# Patient Record
Sex: FEMALE | Race: WHITE
[De-identification: names, ages, dates, MRNs, and addresses within clinical notes are randomized per-mention and may not be internally consistent; named-entity substitution may affect disease eponyms.]

---

## 2017-06-23 ENCOUNTER — HOSPITAL ENCOUNTER (OUTPATIENT)
Dept: HOSPITAL 62 - RAD | Age: 52
End: 2017-06-23
Attending: INTERNAL MEDICINE
Payer: MEDICARE

## 2017-06-23 DIAGNOSIS — R91.8: ICD-10-CM

## 2017-06-23 DIAGNOSIS — J44.9: Primary | ICD-10-CM

## 2017-06-23 DIAGNOSIS — Z72.0: ICD-10-CM

## 2017-06-23 DIAGNOSIS — R06.09: ICD-10-CM

## 2017-06-23 DIAGNOSIS — Z87.01: ICD-10-CM

## 2017-06-23 DIAGNOSIS — R91.1: ICD-10-CM

## 2017-06-23 DIAGNOSIS — R09.02: ICD-10-CM

## 2017-06-23 DIAGNOSIS — Z80.1: ICD-10-CM

## 2017-06-23 PROCEDURE — 71250 CT THORAX DX C-: CPT

## 2017-06-23 NOTE — RADIOLOGY REPORT (SQ)
EXAM DESCRIPTION:  CT CHEST WITHOUT



COMPLETED DATE/TIME:  6/23/2017 11:09 am



REASON FOR STUDY:  SOLITARY PULMONARY NODULE R91.1  SOLITARY PULMONARY NODULE



COMPARISON:  2/17/2017



TECHNIQUE:  CT scan performed of the chest without intravenous contrast.  Images reviewed with lung, 
soft tissue and bone windows.  Reconstructed coronal and sagittal MPR images reviewed.  All images st
ored on PACS.

All CT scanners at this facility use dose modulation, iterative reconstruction, and/or weight based d
osing when appropriate to reduce radiation dose to as low as reasonably achievable (ALARA).

[facility]: Dose Right  [facility]: CareDose    [facility]: Dose Right    [facility]: Teradose 4D    [facility]: Smart Technologies



RADIATION DOSE:  Up-to-date CT equipment and radiation dose reduction techniques were employed. CTDIv
ol: 6.0 mGy. DLP: 248 mGy-cm. mGy.



LIMITATIONS:  No technical limitations.



FINDINGS:  LUNGS AND PLEURA: Bilateral chronic appearing changes are identified which appear stable. 
 The previously described nodule in the right upper lobe is again identified and is less apparent shraddha
n on the previous study.  Again this could represent parenchymal scarring.  On the current study ther
e is some associated ground-glass opacities which may represent an acute process superimposed on the 
chronic underlying changes.  There is a focal ground-glass opacity in the right upper lung field rebekah
uring 7.8 mm best seen on image number 17 which was not apparent on the previous study.  There is a 2
nd focal ground-glass opacity in the left mid lung field measuring 13.4 mm best seen on image number 
27 which was not apparent on the previous study.  A 3rd ground-glass opacity is identified in the lef
t upper lung field anteriorly measuring 10.5 mm which is best seen on image number 30 and was not macie
arent on the previous study.  There are couple other tiny pulmonary nodules which appears stable.  No
 acute consolidations or pleural effusions are identified.

HILAR AND MEDIASTINAL STRUCTURES: No identified masses or abnormal nodes.  No obvious aneurysm.

HEART AND VASCULAR STRUCTURES: No aneurysm.  No pericardial effusion.

UPPER ABDOMEN: No significant findings.  Limited exam.

THYROID AND OTHER SOFT TISSUES: No masses.  No adenopathy.

BONES: No significant finding.

HARDWARE: None in the chest.

OTHER: No other significant findings.



IMPRESSION:  Scattered ground-glass opacities as noted above.  These may represent an acute process a
nd a followup chest CT scan in 6 to 12 months is recommended for further evaluation.  No consolidatio
ns or pleural effusions are identified.  Other findings as noted above.



COMMENT:   Fleischner Criteria for Ground Glass Nodules:

>6mm ground glass single nodule: CT 6-12 mo, then CT every 2 yr until 5 yr



TECHNICAL DOCUMENTATION:  JOB ID:  [id number]

Quality ID # [id number]: Final reports with documentation of one or more dose reduction techniques (e.g., Au
tomated exposure control, adjustment of the mA and/or kV according to patient size, use of iterative 
reconstruction technique)

 2011 [employer]- All Rights Reserved

## 2019-09-30 ENCOUNTER — HOSPITAL ENCOUNTER (INPATIENT)
Dept: HOSPITAL 62 - ER | Age: 54
LOS: 3 days | Discharge: HOME HEALTH | DRG: 193 | End: 2019-10-03
Attending: INTERNAL MEDICINE | Admitting: INTERNAL MEDICINE
Payer: MEDICARE

## 2019-09-30 DIAGNOSIS — E87.1: ICD-10-CM

## 2019-09-30 DIAGNOSIS — F31.9: ICD-10-CM

## 2019-09-30 DIAGNOSIS — J13: Primary | ICD-10-CM

## 2019-09-30 DIAGNOSIS — Z99.81: ICD-10-CM

## 2019-09-30 DIAGNOSIS — F17.210: ICD-10-CM

## 2019-09-30 DIAGNOSIS — I11.0: ICD-10-CM

## 2019-09-30 DIAGNOSIS — J96.22: ICD-10-CM

## 2019-09-30 DIAGNOSIS — Z79.899: ICD-10-CM

## 2019-09-30 DIAGNOSIS — I27.29: ICD-10-CM

## 2019-09-30 DIAGNOSIS — Z79.51: ICD-10-CM

## 2019-09-30 DIAGNOSIS — I50.9: ICD-10-CM

## 2019-09-30 DIAGNOSIS — Z23: ICD-10-CM

## 2019-09-30 DIAGNOSIS — J44.1: ICD-10-CM

## 2019-09-30 DIAGNOSIS — J96.21: ICD-10-CM

## 2019-09-30 LAB
ADD MANUAL DIFF: NO
ALBUMIN SERPL-MCNC: 3.1 G/DL (ref 3.5–5)
ALP SERPL-CCNC: 87 U/L (ref 38–126)
ANION GAP SERPL CALC-SCNC: 2 MMOL/L (ref 5–19)
APPEARANCE UR: CLEAR
APTT PPP: (no result) S
ARTERIAL BLOOD FIO2: (no result)
ARTERIAL BLOOD H2CO3: 3.55 MMOL/L (ref 1.05–1.35)
ARTERIAL BLOOD HCO3: 41.4 MMOL/L (ref 20–24)
ARTERIAL BLOOD PCO2: 117.8 MMHG (ref 35–45)
ARTERIAL BLOOD PH: 7.16 (ref 7.35–7.45)
ARTERIAL BLOOD PO2: 154.4 MMHG (ref 80–100)
ARTERIAL BLOOD TOTAL CO2: 45 MMOL/L (ref 21–25)
AST SERPL-CCNC: 31 U/L (ref 14–36)
BASE EXCESS BLDA CALC-SCNC: 9 MMOL/L
BASOPHILS # BLD AUTO: 0 10^3/UL (ref 0–0.2)
BASOPHILS NFR BLD AUTO: 0.5 % (ref 0–2)
BILIRUB DIRECT SERPL-MCNC: 0.1 MG/DL (ref 0–0.4)
BILIRUB SERPL-MCNC: 0.2 MG/DL (ref 0.2–1.3)
BILIRUB UR QL STRIP: NEGATIVE
BUN SERPL-MCNC: 13 MG/DL (ref 7–20)
CALCIUM: 8.1 MG/DL (ref 8.4–10.2)
CHLORIDE SERPL-SCNC: 87 MMOL/L (ref 98–107)
CO2 SERPL-SCNC: 40 MMOL/L (ref 22–30)
EOSINOPHIL # BLD AUTO: 0 10^3/UL (ref 0–0.6)
EOSINOPHIL NFR BLD AUTO: 0.4 % (ref 0–6)
ERYTHROCYTE [DISTWIDTH] IN BLOOD BY AUTOMATED COUNT: 15.5 % (ref 11.5–14)
GLUCOSE SERPL-MCNC: 134 MG/DL (ref 75–110)
GLUCOSE UR STRIP-MCNC: NEGATIVE MG/DL
HCT VFR BLD CALC: 36.1 % (ref 36–47)
HGB BLD-MCNC: 11 G/DL (ref 12–15.5)
KETONES UR STRIP-MCNC: NEGATIVE MG/DL
LYMPHOCYTES # BLD AUTO: 0.4 10^3/UL (ref 0.5–4.7)
LYMPHOCYTES NFR BLD AUTO: 7.2 % (ref 13–45)
MCH RBC QN AUTO: 24.6 PG (ref 27–33.4)
MCHC RBC AUTO-ENTMCNC: 30.4 G/DL (ref 32–36)
MCV RBC AUTO: 81 FL (ref 80–97)
MONOCYTES # BLD AUTO: 0.4 10^3/UL (ref 0.1–1.4)
MONOCYTES NFR BLD AUTO: 7.9 % (ref 3–13)
NEUTROPHILS # BLD AUTO: 4.7 10^3/UL (ref 1.7–8.2)
NEUTS SEG NFR BLD AUTO: 84 % (ref 42–78)
NITRITE UR QL STRIP: NEGATIVE
NT PRO BNP: 4350 PG/ML (ref 5–900)
PH UR STRIP: 6 [PH] (ref 5–9)
PLATELET # BLD: 233 10^3/UL (ref 150–450)
POTASSIUM SERPL-SCNC: 5 MMOL/L (ref 3.6–5)
PROT SERPL-MCNC: 5.3 G/DL (ref 6.3–8.2)
PROT UR STRIP-MCNC: NEGATIVE MG/DL
RBC # BLD AUTO: 4.46 10^6/UL (ref 3.72–5.28)
SAO2 % BLDA: 98.2 % (ref 94–98)
SP GR UR STRIP: 1
TOTAL CELLS COUNTED % (AUTO): 100 %
TROPONIN I SERPL-MCNC: < 0.012 NG/ML
UROBILINOGEN UR-MCNC: NEGATIVE MG/DL (ref ?–2)
WBC # BLD AUTO: 5.6 10^3/UL (ref 4–10.5)

## 2019-09-30 PROCEDURE — 94799 UNLISTED PULMONARY SVC/PX: CPT

## 2019-09-30 PROCEDURE — 84484 ASSAY OF TROPONIN QUANT: CPT

## 2019-09-30 PROCEDURE — 83880 ASSAY OF NATRIURETIC PEPTIDE: CPT

## 2019-09-30 PROCEDURE — 94668 MNPJ CHEST WALL SBSQ: CPT

## 2019-09-30 PROCEDURE — 36415 COLL VENOUS BLD VENIPUNCTURE: CPT

## 2019-09-30 PROCEDURE — 87086 URINE CULTURE/COLONY COUNT: CPT

## 2019-09-30 PROCEDURE — 85025 COMPLETE CBC W/AUTO DIFF WBC: CPT

## 2019-09-30 PROCEDURE — 71045 X-RAY EXAM CHEST 1 VIEW: CPT

## 2019-09-30 PROCEDURE — 82803 BLOOD GASES ANY COMBINATION: CPT

## 2019-09-30 PROCEDURE — 82962 GLUCOSE BLOOD TEST: CPT

## 2019-09-30 PROCEDURE — 84443 ASSAY THYROID STIM HORMONE: CPT

## 2019-09-30 PROCEDURE — 80307 DRUG TEST PRSMV CHEM ANLYZR: CPT

## 2019-09-30 PROCEDURE — 36600 WITHDRAWAL OF ARTERIAL BLOOD: CPT

## 2019-09-30 PROCEDURE — 99291 CRITICAL CARE FIRST HOUR: CPT

## 2019-09-30 PROCEDURE — 94640 AIRWAY INHALATION TREATMENT: CPT

## 2019-09-30 PROCEDURE — 93306 TTE W/DOPPLER COMPLETE: CPT

## 2019-09-30 PROCEDURE — 81001 URINALYSIS AUTO W/SCOPE: CPT

## 2019-09-30 PROCEDURE — 83735 ASSAY OF MAGNESIUM: CPT

## 2019-09-30 PROCEDURE — 87040 BLOOD CULTURE FOR BACTERIA: CPT

## 2019-09-30 PROCEDURE — 94060 EVALUATION OF WHEEZING: CPT

## 2019-09-30 PROCEDURE — 83605 ASSAY OF LACTIC ACID: CPT

## 2019-09-30 PROCEDURE — 93005 ELECTROCARDIOGRAM TRACING: CPT

## 2019-09-30 PROCEDURE — 5A09457 ASSISTANCE WITH RESPIRATORY VENTILATION, 24-96 CONSECUTIVE HOURS, CONTINUOUS POSITIVE AIRWAY PRESSURE: ICD-10-PCS | Performed by: EMERGENCY MEDICINE

## 2019-09-30 PROCEDURE — 80048 BASIC METABOLIC PNL TOTAL CA: CPT

## 2019-09-30 PROCEDURE — 94667 MNPJ CHEST WALL 1ST: CPT

## 2019-09-30 PROCEDURE — 71275 CT ANGIOGRAPHY CHEST: CPT

## 2019-09-30 PROCEDURE — 93010 ELECTROCARDIOGRAM REPORT: CPT

## 2019-09-30 PROCEDURE — 94660 CPAP INITIATION&MGMT: CPT

## 2019-09-30 PROCEDURE — 96365 THER/PROPH/DIAG IV INF INIT: CPT

## 2019-09-30 PROCEDURE — 80053 COMPREHEN METABOLIC PANEL: CPT

## 2019-09-30 PROCEDURE — 90686 IIV4 VACC NO PRSV 0.5 ML IM: CPT

## 2019-09-30 NOTE — RADIOLOGY REPORT (SQ)
EXAM DESCRIPTION:





CLINICAL HISTORY:

54 years Female, cough/sob



COMPARISON:

Chest x-ray 6/25/2015. Chest 6/23/2017.







FINDINGS:

Mild cardiomegaly which has increased since previous study and is

probably significant because of the underlying hyperinflation. No

suspicious mediastinal widening.

Hyperinflation consistent with COPD/emphysema. There is mild

right lower lobe infiltrate. There is mild left perihilar

possibly superior segment of left lower lobe infiltrate. Previous

CT chest demonstrated view focal interstitial infiltrates.





IMPRESSION:



1. Cardiomegaly. Underlying COPD/emphysema. Infiltrates in the

right lower lobe and left perihilar region. These could represent

infectious pneumonia or more chronic interstitial process. CT may

be more accurate for evaluation of lung parenchyma.

2. 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

finding since 2015.

## 2019-09-30 NOTE — RADIOLOGY REPORT (SQ)
EXAM DESCRIPTION: 



CT CHEST ANGIOGRAPHY WITHOUT THEN WITH IV CONTRAST,

three-dimensional reconstructions



COMPLETED DATE/TME:  09/30/2019 21:37



CLINICAL HISTORY: 



54 years, Female, sob/cough



This exam was performed according to our departmental

dose-optimization program which includes automated exposure

control, adjustment of the mA and/or kVp according to patient

size and/or use of iterative reconstruction technique where

applicable.





Compared to CT chest dated 6/23/2017.



FINDINGS: Thyroid is within normal limits. Aorta is mildly

atherosclerotic without significant evidence for aneurysm or

dissection. Pulmonary arteries are well opacified with no

significant filling defects in the pulmonary arterial tree to

suggest acute pulmonary embolism. No significant pericardial

effusions. Trace layering right pleural effusion. Visualized

upper abdominal organs are unremarkable. Evaluation of the lung

parenchyma demonstrates extensive diffuse emphysematous changes.

No suspicious lung nodule or mass. No consolidations to suggest

pneumonia. Mild right basilar atelectatic changes.



IMPRESSION:



No acute pulmonary embolism. Trace right pleural effusion.

Extensive emphysematous changes.

## 2019-09-30 NOTE — ER DOCUMENT REPORT
ED General





- General


Chief Complaint: Breathing Difficulty


Stated Complaint: RESP DISTRESS


Time Seen by Provider: 19 19:16


Mode of Arrival: Medic


Information source: Patient


TRAVEL OUTSIDE OF THE [place] IN LAST 30 DAYS: No





- HPI


Notes: 





Patient is brought in by medic with respiratory distress.  Patient is a chronic 

smoker with COPD.  She was found by paramedics to have his O2 saturation of 78% 

on room air with elevated CO2.  Patient was complaining of severe shortness of 

breath.  On arrival to the emergency department patient states that she is f

eeling better.  In route she did receive multiple treatments and CPAP as well as

Solu-Medrol and magnesium.  Patient denies any history of congestive heart 

failure.  She denies any pain.  She states she has felt weak.  Her symptoms have

been constant and severe.  They are worse with exertion and better with rest.  

There is no known radiation of the symptoms.  She has had an increased of her 

baseline cough.  No fevers.





- Related Data


Allergies/Adverse Reactions: 


                                        





No Known Allergies Allergy (Verified 14 03:01)


   











Past Medical History





- General


Information source: Patient





- Social History


Smoking Status: Current Every Day Smoker


Frequency of alcohol use: None


Drug Abuse: None


Family History: Reviewed & Not Pertinent, Hypertension


Patient has suicidal ideation: No


Patient has homicidal ideation: No





- Past Medical History


Cardiac Medical History: Reports: Hx Hypertension


   Denies: Hx Coronary Artery Disease, Hx Heart Attack


Pulmonary Medical History: Reports: Hx Asthma, Hx Bronchitis, Hx COPD, Hx 

Pneumonia


Neurological Medical History: Reports: Hx Seizures - ONCE 3 YEARS AGO.  Denies: 

Hx Cerebrovascular Accident


Renal/ Medical History: Reports: Hx Kidney Stones


Musculoskeletal Medical History: Reports Hx Arthritis


Psychiatric Medical History: Reports: Hx Bipolar Disorder


Past Surgical History: Reports: Hx  Section, Hx Orthopedic Surgery - 

right knee.  Denies: Hx Hysterectomy, Hx Pacemaker





- Immunizations


Hx Diphtheria, Pertussis, Tetanus Vaccination: No


Hx Pneumococcal Vaccination: 13





Review of Systems





- Review of Systems


Constitutional: Malaise, Weakness


Cardiovascular: denies: Chest pain, Palpitations


Respiratory: Cough, Short of breath


Gastrointestinal: denies: Abdominal pain, Diarrhea, Vomiting


-: Yes All other systems reviewed and negative





Physical Exam





- Vital signs


Vitals: 


                                        











Resp Pulse Ox


 


 20   99 


 


 19 19:31  19 19:31











Interpretation: Tachycardic, Hypoxic





- General


General appearance: Alert


In distress: Mild





- HEENT


Head: Normocephalic, Atraumatic


Eyes: Normal


Pupils: PERRL





- Respiratory


Respiratory status: Respiratory distress


Chest status: Nontender


Breath sounds: Decreased air movement, Rhonchi, Wheezing


Chest palpation: Normal





- Cardiovascular


Rhythm: Tachycardia


Heart sounds: Normal auscultation


Murmur: No





- Abdominal


Inspection: Normal


Distension: No distension


Bowel sounds: Normal


Tenderness: Nontender


Organomegaly: No organomegaly





- Back


Back: Normal, Nontender





- Extremities


General upper extremity: Normal inspection, Nontender, Normal color, Normal ROM,

Normal temperature


General lower extremity: Normal inspection, Nontender, Normal color, Normal ROM,

Normal temperature, Normal weight bearing.  No: Mag's sign





- Neurological


Neuro grossly intact: Yes


Cognition: Normal


Orientation: AAOx4


Seymour Coma Scale Eye Opening: Spontaneous


Seymour Coma Scale Verbal: Oriented


Rajiv Coma Scale Motor: Obeys Commands


Rajiv Coma Scale Total: 15


Speech: Normal


Motor strength normal: LUE, RUE, LLE, RLE


Sensory: Normal





- Psychological


Associated symptoms: Normal affect, Normal mood





- Skin


Skin Temperature: Warm


Skin Moisture: Dry


Skin Color: Normal





Course





- Re-evaluation


Re-evalutation: 





19 23:03


Patient arrived with some mild tachycardia and tachypnea.  She was better after 

the treatments in the ambulance.  Patient still had wheezing in all fields.  She

had a pH of 7.16.  Her CO2 was 117.  She was mentating normally with a 

saturation of 94% on 4 L.  However due to the severe hypercapnia I placed the 

patient on BiPAP.  She also had a mixed picture of infectious versus possible 

heart failure.  It is possible she may also have both processes.  Therefore I 

have treated the patient with Lasix and antibiotics.  I have discussed the case 

with the hospitalist who is accepted her for admission.  I have ordered a CTA of

the chest and this is currently pending.  Hospitalist is aware and will follow 

up on the results.  Patient states that she is currently feeling much better.  

She is doing well on BiPAP with good oxygen saturations.  She is hemodynamically

stable.





- Vital Signs


Vital signs: 


                                        











Temp Pulse Resp BP Pulse Ox


 


 98.2 F      18   133/72 H  100 


 


 19 19:46     19 21:35  19 21:35  19 21:35














- Laboratory


Result Diagrams: 


                                 19 19:21





                                 19 19:21


Laboratory results interpreted by me: 


                                        











  19





  19:21 19:21 19:21


 


Hgb  11.0 L  


 


MCH  24.6 L  


 


MCHC  30.4 L  


 


RDW  15.5 H  


 


Lymph % (Auto)  7.2 L  


 


Absolute Lymphs (auto)  0.4 L  


 


Seg Neutrophils %  84.0 H  


 


Carbonic Acid   


 


ABG pH   


 


ABG pCO2   


 


ABG pO2   


 


ABG HCO3   


 


ABG Total CO2   


 


ABG O2 Saturation   


 


Sodium   129.1 L 


 


Chloride   87 L 


 


Carbon Dioxide   40 H* 


 


Anion Gap   2 L 


 


Glucose   134 H 


 


POC Glucose   


 


Lactic Acid   


 


Calcium   8.1 L 


 


NT-Pro-B Natriuret Pep    4350 H


 


Total Protein   5.3 L 


 


Albumin   3.1 L 














  19





  19:21 20:04 20:33


 


Hgb   


 


MCH   


 


MCHC   


 


RDW   


 


Lymph % (Auto)   


 


Absolute Lymphs (auto)   


 


Seg Neutrophils %   


 


Carbonic Acid  3.55 H  


 


ABG pH  7.16 L*  


 


ABG pCO2  117.8 H*  


 


ABG pO2  154.4 H  


 


ABG HCO3  41.4 H  


 


ABG Total CO2  45.0 H  


 


ABG O2 Saturation  98.2 H  


 


Sodium   


 


Chloride   


 


Carbon Dioxide   


 


Anion Gap   


 


Glucose   


 


POC Glucose   140 H 


 


Lactic Acid    0.6 L


 


Calcium   


 


NT-Pro-B Natriuret Pep   


 


Total Protein   


 


Albumin   














- Diagnostic Test


Radiology reviewed: Image reviewed, Reports reviewed





- EKG Interpretation by Me


EKG shows normal: Sinus rhythm


Rate: Tachycardia - 105


Rhythm: NSR


Axis/QRS: RBBB - incomplete





Critical Care Note





- Critical Care Note


Total time excluding time spent on procedures (mins): 50


Comments: 





Approximately 50 minutes of critical care time were spent on this patient.  This

included multiple reassessments.  Including discussing the case with patient and

family.  It including discussing the case with consultants.  It included 

reviewing laboratories and images.  It also included reviewing old records.





Discharge





- Discharge


Clinical Impression: 


 COPD exacerbation





Respiratory failure


Qualifiers:


 Chronicity: acute Respiratory failure complication: hypercapnia Qualified 

Code(s): J96.02 - Acute respiratory failure with hypercapnia





Condition: Serious


Disposition: ADMITTED AS INPATIENT


Admitting Provider: Benrard (Hospitalist)


Unit Admitted: [facility]

## 2019-10-01 LAB
ADD MANUAL DIFF: NO
ANION GAP SERPL CALC-SCNC: 5 MMOL/L (ref 5–19)
ARTERIAL BLOOD FIO2: (no result)
ARTERIAL BLOOD H2CO3: 2.52 MMOL/L (ref 1.05–1.35)
ARTERIAL BLOOD HCO3: 41.6 MMOL/L (ref 20–24)
ARTERIAL BLOOD PCO2: 83.8 MMHG (ref 35–45)
ARTERIAL BLOOD PH: 7.31 (ref 7.35–7.45)
ARTERIAL BLOOD PO2: 97.2 MMHG (ref 80–100)
ARTERIAL BLOOD TOTAL CO2: 44.2 MMOL/L (ref 21–25)
BARBITURATES UR QL SCN: NEGATIVE
BASE EXCESS BLDA CALC-SCNC: 12.2 MMOL/L
BASOPHILS # BLD AUTO: 0 10^3/UL (ref 0–0.2)
BASOPHILS NFR BLD AUTO: 0.1 % (ref 0–2)
BUN SERPL-MCNC: 10 MG/DL (ref 7–20)
CALCIUM: 8.2 MG/DL (ref 8.4–10.2)
CHLORIDE SERPL-SCNC: 86 MMOL/L (ref 98–107)
CO2 SERPL-SCNC: 41 MMOL/L (ref 22–30)
EOSINOPHIL # BLD AUTO: 0 10^3/UL (ref 0–0.6)
EOSINOPHIL NFR BLD AUTO: 0 % (ref 0–6)
ERYTHROCYTE [DISTWIDTH] IN BLOOD BY AUTOMATED COUNT: 15.6 % (ref 11.5–14)
GLUCOSE SERPL-MCNC: 109 MG/DL (ref 75–110)
HCT VFR BLD CALC: 36.7 % (ref 36–47)
HGB BLD-MCNC: 11.3 G/DL (ref 12–15.5)
LYMPHOCYTES # BLD AUTO: 0.2 10^3/UL (ref 0.5–4.7)
LYMPHOCYTES NFR BLD AUTO: 7 % (ref 13–45)
MCH RBC QN AUTO: 24.7 PG (ref 27–33.4)
MCHC RBC AUTO-ENTMCNC: 30.9 G/DL (ref 32–36)
MCV RBC AUTO: 80 FL (ref 80–97)
METHADONE UR QL SCN: NEGATIVE
MONOCYTES # BLD AUTO: 0.1 10^3/UL (ref 0.1–1.4)
MONOCYTES NFR BLD AUTO: 3.3 % (ref 3–13)
NEUTROPHILS # BLD AUTO: 2 10^3/UL (ref 1.7–8.2)
NEUTS SEG NFR BLD AUTO: 89.6 % (ref 42–78)
PCP UR QL SCN: NEGATIVE
PLATELET # BLD: 257 10^3/UL (ref 150–450)
POTASSIUM SERPL-SCNC: 5.3 MMOL/L (ref 3.6–5)
RBC # BLD AUTO: 4.6 10^6/UL (ref 3.72–5.28)
SAO2 % BLDA: 96.4 % (ref 94–98)
TOTAL CELLS COUNTED % (AUTO): 100 %
URINE AMPHETAMINES SCREEN: NEGATIVE
URINE BENZODIAZEPINES SCREEN: NEGATIVE
URINE COCAINE SCREEN: NEGATIVE
URINE MARIJUANA (THC) SCREEN: (no result)
WBC # BLD AUTO: 2.3 10^3/UL (ref 4–10.5)

## 2019-10-01 RX ADMIN — HEPARIN SODIUM SCH UNIT: 5000 INJECTION, SOLUTION INTRAVENOUS; SUBCUTANEOUS at 06:01

## 2019-10-01 RX ADMIN — ACETAMINOPHEN PRN MG: 325 TABLET ORAL at 19:00

## 2019-10-01 RX ADMIN — AZITHROMYCIN MONOHYDRATE SCH MLS/HR: 500 INJECTION, POWDER, LYOPHILIZED, FOR SOLUTION INTRAVENOUS at 21:33

## 2019-10-01 RX ADMIN — PREDNISONE SCH MG: 20 TABLET ORAL at 09:19

## 2019-10-01 RX ADMIN — ACETAMINOPHEN PRN MG: 325 TABLET ORAL at 14:22

## 2019-10-01 RX ADMIN — FLUTICASONE PROPIONATE SCH SPR: 50 SPRAY, METERED NASAL at 09:19

## 2019-10-01 RX ADMIN — HEPARIN SODIUM SCH UNIT: 5000 INJECTION, SOLUTION INTRAVENOUS; SUBCUTANEOUS at 21:41

## 2019-10-01 RX ADMIN — ACETAMINOPHEN PRN MG: 325 TABLET ORAL at 06:05

## 2019-10-01 RX ADMIN — FLUTICASONE PROPIONATE SCH SPR: 50 SPRAY, METERED NASAL at 21:41

## 2019-10-01 RX ADMIN — CEFTRIAXONE SCH MLS/HR: 1 INJECTION, SOLUTION INTRAVENOUS at 20:39

## 2019-10-01 RX ADMIN — PREDNISONE SCH MG: 20 TABLET ORAL at 17:26

## 2019-10-01 RX ADMIN — HEPARIN SODIUM SCH UNIT: 5000 INJECTION, SOLUTION INTRAVENOUS; SUBCUTANEOUS at 14:18

## 2019-10-01 RX ADMIN — NICOTINE SCH EACH: 21 PATCH, EXTENDED RELEASE TOPICAL at 15:32

## 2019-10-01 NOTE — EKG REPORT
SEVERITY:- ABNORMAL ECG -

SINUS TACHYCARDIA

INCOMPLETE RIGHT BUNDLE BRANCH BLOCK

:

Confirmed by: Eleanor Cha MD 01-Oct-2019 00:24:40

## 2019-10-01 NOTE — PDOC H&P
History of Present Illness


Admission Date/PCP: 


  19 22:57





  





Patient complains of: Shortness of breath


History of Present Illness: 


CICI KIRKPATRICK is a 54 year old female with a past medical history of oxygen 

dependent COPD, tobacco dependence, bipolar hypertension and dyslipidemia.  She 

presents with several days of shortness of breath prompting evaluation emergency

room where she is found to have acute on chronic hypercapnic hypoxic respiratory

failure, right lower lobe pneumonia, congestive heart failure and hyponatremia. 

She started on BiPAP and referred to the hospitalist for admission.  She denies 

current medications.  She admits symptom improvement on BiPAP








Past Medical History


Cardiac Medical History: Reports: Hypertension


   Denies: Coronary Artery Disease, Myocardial Infarction


Pulmonary Medical History: Reports: Asthma, Bronchitis, Chronic Obstructive 

Pulmonary Disease (COPD), Pneumonia


Neurological Medical History: Reports: Seizures - ONCE 3 YEARS AGO


Musculoskeltal Medical History: Reports: Arthritis


Psychiatric Medical History: Reports: Bipolar Disorder, Depression, Tobacco 

Dependency


Hematology: 


   Denies: Anemia





Past Surgical History


Past Surgical History: Reports:  Section, Orthopedic Surgery - right 

knee


   Denies: Hysterectomy, Pacemaker





Social History


Information Source: Patient, [facility] Records


Smoking Status: Current Every Day Smoker


Cigarettes Packs Per Day: 0.5


Number of Years Smokin


Last Time Smoked: 2019


Frequency of Alcohol Use: None


Hx Recreational Drug Use: Yes


Drugs: Marijuana


Hx Prescription Drug Abuse: No





- Advance Directive


Resuscitation Status: Full Code





Family History


Family History: COPD, Hypertension


Parental Family History Reviewed: Yes


Children Family History Reviewed: Yes


Sibling(s) Family History Reviewed.: Yes





Medication/Allergy


Home Medications: 








Bupropion HCl [Wellbutrin] 75 mg PO 11 


Escitalopram Oxalate [Lexapro] 5 mg PO 11 


Invega  11 


Lisinopril/Hydrochlorothiazide [Zestoretic 20-12.5 mg Tablet] 1 each PO DAILY 

13 


Simvastatin [Zocor 40 mg Tablet] 40 mg PO QHS 13 


Risperidone [Risperdal] 5 mg PO 13 


Albuterol Sulfate [Albuterol Sulfate 2.5mg/3 mL] 1 vial IH Q4 PRN #30 vial 

13 


Albuterol Sulfate [Proair HFA Inhalation Aerosol 8.5 gm MDI] 2 puff IH Q4H PRN 

#1 mdi 13 


Clonidine HCl [Catapres 0.2 mg Tablet] 0.2 mg PO 13 


Levofloxacin [Levaquin 750 mg Tablet] 750 mg PO DAILY #5 tablet 06/26/15 


Methylprednisolone [Medrol Dosepack (4 mg/Tab) 21 Tab/Dosepak] 4 mg PO ASDIR PRN

#21 tab.ds.pk 06/26/15 








Allergies/Adverse Reactions: 


                                        





No Known Allergies Allergy (Verified 14 03:01)


   











Review of Systems


ROS unobtainable: Due to mental status





Physical Exam


Vital Signs: 


                                        











Temp Pulse Resp BP Pulse Ox


 


 97.7 F      20   163/87 H  96 


 


 10/01/19 01:15     10/01/19 01:15  10/01/19 01:15  10/01/19 01:15








                                 Intake & Output











 09/29/19 09/30/19 10/01/19





 11:59 11:59 11:59


 


Intake Total   50


 


Balance   50


 


Weight   64.2 kg











General appearance: PRESENT: cooperative, disheveled, severe distress, well-

developed, other - Chronically ill-appearing, appears much older than stated 

age.  ABSENT: well-nourished


Head exam: PRESENT: atraumatic, normocephalic


Eye exam: PRESENT: conjunctiva pink, EOMI, PERRLA.  ABSENT: scleral icterus


Ear exam: PRESENT: normal external ear exam


Mouth exam: PRESENT: moist, tongue midline


Neck exam: ABSENT: carotid bruit, JVD, lymphadenopathy, thyromegaly


Respiratory exam: PRESENT: accessory muscle use, crackles, prolonged expiratory 

phas, rales, retraction, tachypnea


Cardiovascular exam: PRESENT: RRR.  ABSENT: diastolic murmur, rubs, systolic 

murmur


Pulses: PRESENT: normal dorsalis pedis pul


Vascular exam: PRESENT: normal capillary refill


GI/Abdominal exam: PRESENT: normal bowel sounds, soft.  ABSENT: distended, 

guarding, mass, organolmegaly, rebound, tenderness


Rectal exam: PRESENT: deferred


Extremities exam: PRESENT: full ROM.  ABSENT: calf tenderness, clubbing, pedal 

edema


Neurological exam: PRESENT: alert, awake, oriented to person, oriented to place,

oriented to time, oriented to situation, CN II-XII grossly intact.  ABSENT: 

motor sensory deficit


Psychiatric exam: PRESENT: appropriate affect, normal mood.  ABSENT: homicidal 

ideation, suicidal ideation


Skin exam: PRESENT: dry, intact, warm.  ABSENT: cyanosis, rash





Results


Laboratory Results: 


                                        





                                 19 19:21 





                                 19 19:21 





                                        











  19





  19:21 19:21 19:21


 


WBC  5.6  


 


RBC  4.46  


 


Hgb  11.0 L  


 


Hct  36.1  


 


MCV  81  


 


MCH  24.6 L  


 


MCHC  30.4 L  


 


RDW  15.5 H  


 


Plt Count  233  


 


Seg Neutrophils %  84.0 H  


 


Carbonic Acid    3.55 H


 


HCO3/H2CO3 Ratio    11:1


 


ABG pH    7.16 L*


 


ABG pCO2    117.8 H*


 


ABG pO2    154.4 H


 


ABG HCO3    41.4 H


 


ABG O2 Saturation    98.2 H


 


ABG Base Excess    9.0


 


FiO2    9L


 


Sodium   129.1 L 


 


Potassium   5.0 


 


Chloride   87 L 


 


Carbon Dioxide   40 H* 


 


Anion Gap   2 L 


 


BUN   13 


 


Creatinine   0.86 


 


Est GFR ( Amer)   > 60 


 


Glucose   134 H 


 


Lactic Acid   


 


Calcium   8.1 L 


 


Magnesium   


 


Total Bilirubin   0.2 


 


AST   31 


 


Alkaline Phosphatase   87 


 


Total Protein   5.3 L 


 


Albumin   3.1 L 


 


TSH   


 


Urine Color   


 


Urine Appearance   


 


Urine pH   


 


Ur Specific Gravity   


 


Urine Protein   


 


Urine Glucose (UA)   


 


Urine Ketones   


 


Urine Blood   


 


Urine Nitrite   


 


Ur Leukocyte Esterase   


 


Urine WBC (Auto)   


 


Urine RBC (Auto)   














  19





  19:21 19:21 20:33


 


WBC   


 


RBC   


 


Hgb   


 


Hct   


 


MCV   


 


MCH   


 


MCHC   


 


RDW   


 


Plt Count   


 


Seg Neutrophils %   


 


Carbonic Acid   


 


HCO3/H2CO3 Ratio   


 


ABG pH   


 


ABG pCO2   


 


ABG pO2   


 


ABG HCO3   


 


ABG O2 Saturation   


 


ABG Base Excess   


 


FiO2   


 


Sodium   


 


Potassium   


 


Chloride   


 


Carbon Dioxide   


 


Anion Gap   


 


BUN   


 


Creatinine   


 


Est GFR (African Amer)   


 


Glucose   


 


Lactic Acid    0.6 L


 


Calcium   


 


Magnesium   2.6 H 


 


Total Bilirubin   


 


AST   


 


Alkaline Phosphatase   


 


Total Protein   


 


Albumin   


 


TSH  0.51  


 


Urine Color   


 


Urine Appearance   


 


Urine pH   


 


Ur Specific Gravity   


 


Urine Protein   


 


Urine Glucose (UA)   


 


Urine Ketones   


 


Urine Blood   


 


Urine Nitrite   


 


Ur Leukocyte Esterase   


 


Urine WBC (Auto)   


 


Urine RBC (Auto)   














  09/30/19 10/01/19





  21:46 01:50


 


WBC  


 


RBC  


 


Hgb  


 


Hct  


 


MCV  


 


MCH  


 


MCHC  


 


RDW  


 


Plt Count  


 


Seg Neutrophils %  


 


Carbonic Acid   2.52 H


 


HCO3/H2CO3 Ratio   16:1


 


ABG pH   7.31 L


 


ABG pCO2   83.8 H*


 


ABG pO2   97.2


 


ABG HCO3   41.6 H


 


ABG O2 Saturation   96.4


 


ABG Base Excess   12.2


 


FiO2   40%


 


Sodium  


 


Potassium  


 


Chloride  


 


Carbon Dioxide  


 


Anion Gap  


 


BUN  


 


Creatinine  


 


Est GFR (African Amer)  


 


Glucose  


 


Lactic Acid  


 


Calcium  


 


Magnesium  


 


Total Bilirubin  


 


AST  


 


Alkaline Phosphatase  


 


Total Protein  


 


Albumin  


 


TSH  


 


Urine Color  STRAW 


 


Urine Appearance  CLEAR 


 


Urine pH  6.0 


 


Ur Specific Gravity  1.004 


 


Urine Protein  NEGATIVE 


 


Urine Glucose (UA)  NEGATIVE 


 


Urine Ketones  NEGATIVE 


 


Urine Blood  NEGATIVE 


 


Urine Nitrite  NEGATIVE 


 


Ur Leukocyte Esterase  NEGATIVE 


 


Urine WBC (Auto)  1 


 


Urine RBC (Auto)  1 








                                        











  19





  19:21


 


Troponin I  < 0.012


 


NT-Pro-B Natriuret Pep  4350 H











Impressions: 


                                        





Chest X-Ray  19 19:20


IMPRESSION:


 


1. Cardiomegaly. Underlying COPD/emphysema. Infiltrates in the


right lower lobe and left perihilar region. These could represent


infectious pneumonia or more chronic interstitial process. CT may


be more accurate for evaluation of lung parenchyma.


2. 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


finding since .


 








Chest/Abdomen CTA  19 21:37


IMPRESSION:


 


No acute pulmonary embolism. Trace right pleural effusion.


Extensive emphysematous changes.


 














Assessment and Plan





- Diagnosis


(1) PNA (pneumonia)


Is this a current diagnosis for this admission?: Yes   


Plan: 


Pneumonia care set deployed, empiric antibiotics, supplemental oxygen with 

BiPAP.  Follow-up CBC and blood culture








(2) Respiratory failure


Qualifiers: 


   Chronicity: acute   Respiratory failure complication: hypercapnia   Qualified

Code(s): J96.02 - Acute respiratory failure with hypercapnia   


Is this a current diagnosis for this admission?: Yes   


Plan: 


Severe acute on chronic, hypercapnic and hypoxic respiratory failure.  BiPAP, 

albuterol and Atrovent, Solu-Medrol, flutter valve and incentive spirometry








(3) Congestive heart failure


Is this a current diagnosis for this admission?: Yes   


Plan: 


Suspect severe pulmonary hypertension and cor pulmonale.  Follow-up echo








(4) COPD exacerbation


Is this a current diagnosis for this admission?: Yes   


Plan: 


Secondary to #1








(5) Hyponatremia


Is this a current diagnosis for this admission?: Yes   


Plan: 


Appears hypovolemic, hypotonic, IV fluid challenge, follow-up chemistry








- Time


Time Spent with patient: 25-34 minutes





- Inpatient Certification


Medical Necessity: Need Close Monitoring Due to Risk of Patient Decompensation

## 2019-10-01 NOTE — PDOC PROGRESS REPORT
Subjective


Progress Note for:: 10/01/19


Subjective:: 





BiPAP mask is leaking.  She actually appears fairly comfortable.  She is 

surprisingly mentating clearly despite PCO2 of 83.


Reason For Visit: 


RESP FAILURE








Physical Exam


Vital Signs: 


                                        











Temp Pulse Resp BP Pulse Ox


 


 98.1 F   99   17   149/69 H  90 L


 


 10/01/19 08:51  10/01/19 08:51  10/01/19 12:23  10/01/19 08:51  10/01/19 12:23








                                 Intake & Output











 09/30/19 10/01/19 10/02/19





 06:59 06:59 06:59


 


Intake Total  50 


 


Balance  50 


 


Weight  56.8 kg 











General appearance: PRESENT: cooperative, mild distress, thin


Head exam: PRESENT: atraumatic, normocephalic


Eye exam: PRESENT: conjunctiva pink, EOMI.  ABSENT: scleral icterus


Ear exam: PRESENT: normal external ear exam.  ABSENT: bleeding, drainage


Mouth exam: PRESENT: other - BiPAP mask in place


Respiratory exam: PRESENT: prolonged expiratory phas, rhonchi, symmetrical, 

tachypnea, wheezes - Bilateral especially expiratory.  ABSENT: stridor


Cardiovascular exam: PRESENT: RRR, +S1, +S2


GI/Abdominal exam: PRESENT: normal bowel sounds, soft.  ABSENT: distended, 

guarding, tenderness


Extremities exam: PRESENT: full ROM.  ABSENT: calf tenderness, pedal edema


Musculoskeletal exam: PRESENT: ambulatory, normal inspection


Neurological exam: PRESENT: alert, awake, oriented to person, oriented to place,

oriented to time, oriented to situation, CN II-XII grossly intact


Psychiatric exam: PRESENT: flat affect.  ABSENT: agitated, anxious


Focused psych exam: ABSENT: delusional, restlessness





Results


Laboratory Results: 


                                        





                                 10/01/19 06:06 





                                 10/01/19 05:15 





                                        











  09/30/19 09/30/19 09/30/19





  19:21 19:21 19:21


 


WBC  5.6  


 


RBC  4.46  


 


Hgb  11.0 L  


 


Hct  36.1  


 


MCV  81  


 


MCH  24.6 L  


 


MCHC  30.4 L  


 


RDW  15.5 H  


 


Plt Count  233  


 


Seg Neutrophils %  84.0 H  


 


Carbonic Acid    3.55 H


 


HCO3/H2CO3 Ratio    11:1


 


ABG pH    7.16 L*


 


ABG pCO2    117.8 H*


 


ABG pO2    154.4 H


 


ABG HCO3    41.4 H


 


ABG O2 Saturation    98.2 H


 


ABG Base Excess    9.0


 


FiO2    9L


 


Sodium   129.1 L 


 


Potassium   5.0 


 


Chloride   87 L 


 


Carbon Dioxide   40 H* 


 


Anion Gap   2 L 


 


BUN   13 


 


Creatinine   0.86 


 


Est GFR ( Amer)   > 60 


 


Glucose   134 H 


 


Lactic Acid   


 


Calcium   8.1 L 


 


Magnesium   


 


Total Bilirubin   0.2 


 


AST   31 


 


Alkaline Phosphatase   87 


 


Total Protein   5.3 L 


 


Albumin   3.1 L 


 


TSH   


 


Urine Color   


 


Urine Appearance   


 


Urine pH   


 


Ur Specific Gravity   


 


Urine Protein   


 


Urine Glucose (UA)   


 


Urine Ketones   


 


Urine Blood   


 


Urine Nitrite   


 


Ur Leukocyte Esterase   


 


Urine WBC (Auto)   


 


Urine RBC (Auto)   














  09/30/19 09/30/19 09/30/19





  19:21 19:21 20:33


 


WBC   


 


RBC   


 


Hgb   


 


Hct   


 


MCV   


 


MCH   


 


MCHC   


 


RDW   


 


Plt Count   


 


Seg Neutrophils %   


 


Carbonic Acid   


 


HCO3/H2CO3 Ratio   


 


ABG pH   


 


ABG pCO2   


 


ABG pO2   


 


ABG HCO3   


 


ABG O2 Saturation   


 


ABG Base Excess   


 


FiO2   


 


Sodium   


 


Potassium   


 


Chloride   


 


Carbon Dioxide   


 


Anion Gap   


 


BUN   


 


Creatinine   


 


Est GFR (African Amer)   


 


Glucose   


 


Lactic Acid    0.6 L


 


Calcium   


 


Magnesium   2.6 H 


 


Total Bilirubin   


 


AST   


 


Alkaline Phosphatase   


 


Total Protein   


 


Albumin   


 


TSH  0.51  


 


Urine Color   


 


Urine Appearance   


 


Urine pH   


 


Ur Specific Gravity   


 


Urine Protein   


 


Urine Glucose (UA)   


 


Urine Ketones   


 


Urine Blood   


 


Urine Nitrite   


 


Ur Leukocyte Esterase   


 


Urine WBC (Auto)   


 


Urine RBC (Auto)   














  09/30/19 10/01/19 10/01/19





  21:46 01:50 05:15


 


WBC    Cancelled


 


RBC    Cancelled


 


Hgb    Cancelled


 


Hct    Cancelled


 


MCV    Cancelled


 


MCH    Cancelled


 


MCHC    Cancelled


 


RDW    Cancelled


 


Plt Count    Cancelled


 


Seg Neutrophils %    Cancelled


 


Carbonic Acid   2.52 H 


 


HCO3/H2CO3 Ratio   16:1 


 


ABG pH   7.31 L 


 


ABG pCO2   83.8 H* 


 


ABG pO2   97.2 


 


ABG HCO3   41.6 H 


 


ABG O2 Saturation   96.4 


 


ABG Base Excess   12.2 


 


FiO2   40% 


 


Sodium   


 


Potassium   


 


Chloride   


 


Carbon Dioxide   


 


Anion Gap   


 


BUN   


 


Creatinine   


 


Est GFR (African Amer)   


 


Glucose   


 


Lactic Acid   


 


Calcium   


 


Magnesium   


 


Total Bilirubin   


 


AST   


 


Alkaline Phosphatase   


 


Total Protein   


 


Albumin   


 


TSH   


 


Urine Color  STRAW  


 


Urine Appearance  CLEAR  


 


Urine pH  6.0  


 


Ur Specific Gravity  1.004  


 


Urine Protein  NEGATIVE  


 


Urine Glucose (UA)  NEGATIVE  


 


Urine Ketones  NEGATIVE  


 


Urine Blood  NEGATIVE  


 


Urine Nitrite  NEGATIVE  


 


Ur Leukocyte Esterase  NEGATIVE  


 


Urine WBC (Auto)  1  


 


Urine RBC (Auto)  1  














  10/01/19 10/01/19





  05:15 06:06


 


WBC   2.3 L D


 


RBC   4.60


 


Hgb   11.3 L


 


Hct   36.7


 


MCV   80


 


MCH   24.7 L


 


MCHC   30.9 L


 


RDW   15.6 H


 


Plt Count   257


 


Seg Neutrophils %   89.6 H


 


Carbonic Acid  


 


HCO3/H2CO3 Ratio  


 


ABG pH  


 


ABG pCO2  


 


ABG pO2  


 


ABG HCO3  


 


ABG O2 Saturation  


 


ABG Base Excess  


 


FiO2  


 


Sodium  132.3 L 


 


Potassium  5.3 H 


 


Chloride  86 L 


 


Carbon Dioxide  41 H* 


 


Anion Gap  5 


 


BUN  10 


 


Creatinine  0.74 


 


Est GFR (African Amer)  > 60 


 


Glucose  109 


 


Lactic Acid  


 


Calcium  8.2 L 


 


Magnesium  


 


Total Bilirubin  


 


AST  


 


Alkaline Phosphatase  


 


Total Protein  


 


Albumin  


 


TSH  


 


Urine Color  


 


Urine Appearance  


 


Urine pH  


 


Ur Specific Gravity  


 


Urine Protein  


 


Urine Glucose (UA)  


 


Urine Ketones  


 


Urine Blood  


 


Urine Nitrite  


 


Ur Leukocyte Esterase  


 


Urine WBC (Auto)  


 


Urine RBC (Auto)  








                                        











  09/30/19





  19:21


 


Troponin I  < 0.012


 


NT-Pro-B Natriuret Pep  4350 H











Impressions: 


                                        





Chest X-Ray  09/30/19 19:20


IMPRESSION:


 


1. Cardiomegaly. Underlying COPD/emphysema. Infiltrates in the


right lower lobe and left perihilar region. These could represent


infectious pneumonia or more chronic interstitial process. CT may


be more accurate for evaluation of lung parenchyma.


2. 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


finding since 2015.


 








Chest/Abdomen CTA  09/30/19 21:37


IMPRESSION:


 


No acute pulmonary embolism. Trace right pleural effusion.


Extensive emphysematous changes.


 














Assessment and Plan





- Diagnosis


(1) PNA (pneumonia)


Is this a current diagnosis for this admission?: Yes   





(2) Respiratory failure


Qualifiers: 


   Chronicity: acute   Respiratory failure complication: hypercapnia   Qualified

Code(s): J96.02 - Acute respiratory failure with hypercapnia   


Is this a current diagnosis for this admission?: Yes   





(3) Congestive heart failure


Qualifiers: 


   Heart failure type: unspecified 


Is this a current diagnosis for this admission?: Yes   





(4) COPD exacerbation


Is this a current diagnosis for this admission?: Yes   





(5) Hyponatremia


Is this a current diagnosis for this admission?: Yes   





(6) Hypertension


Qualifiers: 


   Hypertension type: essential hypertension   Qualified Code(s): I10 - 

Essential (primary) hypertension   


Is this a current diagnosis for this admission?: Yes   





- Summary


Assessment: 


* Pneumonia-pneumonia order set instituted.  IV antibiotics, steroids, 

  supplemental oxygen as well as nebulizer treatments.


* Acute on chronic respiratory failure with hypoxia and hypercapnia-PCO2 was 118

  on admission.  This morning she in fact is resting comfortably and mentating 

  fairly clearly with a PCO2 of 83.  She is wearing her BiPAP.  This likely 

  indicates her baseline PCO2 above 50.  Continue current treatment plan.


* COPD exacerbation-see plan above


* Hyponatremia-IV fluids.  Monitor sodium.


* Congestive heart failure-echocardiogram has been ordered.  Will better be able

  to delineate etiology and treatment plan once this information is available.


* Hypertension-continue lisinopril.  She may benefit from beta-blockade for her 

  congestive heart failure.





- Time


Time Spent with patient: 15-24 minutes


Medications reviewed and adjusted accordingly: Yes


Anticipated discharge: Home

## 2019-10-02 LAB
ARTERIAL BLOOD FIO2: (no result)
ARTERIAL BLOOD H2CO3: 2.31 MMOL/L (ref 1.05–1.35)
ARTERIAL BLOOD HCO3: 43.6 MMOL/L (ref 20–24)
ARTERIAL BLOOD PCO2: 76.8 MMHG (ref 35–45)
ARTERIAL BLOOD PH: 7.37 (ref 7.35–7.45)
ARTERIAL BLOOD PO2: 72.9 MMHG (ref 80–100)
ARTERIAL BLOOD TOTAL CO2: 46 MMOL/L (ref 21–25)
BASE EXCESS BLDA CALC-SCNC: 15 MMOL/L
SAO2 % BLDA: 93.5 % (ref 94–98)

## 2019-10-02 RX ADMIN — AZITHROMYCIN MONOHYDRATE SCH MLS/HR: 500 INJECTION, POWDER, LYOPHILIZED, FOR SOLUTION INTRAVENOUS at 21:24

## 2019-10-02 RX ADMIN — HEPARIN SODIUM SCH UNIT: 5000 INJECTION, SOLUTION INTRAVENOUS; SUBCUTANEOUS at 06:16

## 2019-10-02 RX ADMIN — ACETAMINOPHEN PRN MG: 325 TABLET ORAL at 03:29

## 2019-10-02 RX ADMIN — PREDNISONE SCH MG: 20 TABLET ORAL at 09:05

## 2019-10-02 RX ADMIN — HEPARIN SODIUM SCH UNIT: 5000 INJECTION, SOLUTION INTRAVENOUS; SUBCUTANEOUS at 21:23

## 2019-10-02 RX ADMIN — METOPROLOL SUCCINATE SCH MG: 25 TABLET, EXTENDED RELEASE ORAL at 09:37

## 2019-10-02 RX ADMIN — TIOTROPIUM BROMIDE SCH CAP: 18 CAPSULE ORAL; RESPIRATORY (INHALATION) at 09:38

## 2019-10-02 RX ADMIN — ARIPIPRAZOLE SCH MG: 5 TABLET ORAL at 09:37

## 2019-10-02 RX ADMIN — RISPERIDONE SCH MG: 1 TABLET ORAL at 09:37

## 2019-10-02 RX ADMIN — BUPROPION HYDROCHLORIDE SCH MG: 100 TABLET, FILM COATED ORAL at 09:37

## 2019-10-02 RX ADMIN — IPRATROPIUM BROMIDE AND ALBUTEROL SULFATE SCH ML: 2.5; .5 SOLUTION RESPIRATORY (INHALATION) at 20:42

## 2019-10-02 RX ADMIN — BUDESONIDE SCH MG: 0.5 SUSPENSION RESPIRATORY (INHALATION) at 20:42

## 2019-10-02 RX ADMIN — CEFTRIAXONE SCH MLS/HR: 1 INJECTION, SOLUTION INTRAVENOUS at 17:48

## 2019-10-02 RX ADMIN — FLUTICASONE PROPIONATE SCH SPR: 50 SPRAY, METERED NASAL at 09:05

## 2019-10-02 RX ADMIN — FLUTICASONE PROPIONATE SCH SPR: 50 SPRAY, METERED NASAL at 21:22

## 2019-10-02 RX ADMIN — BUPROPION HYDROCHLORIDE SCH MG: 100 TABLET, FILM COATED ORAL at 17:47

## 2019-10-02 RX ADMIN — NICOTINE SCH EACH: 21 PATCH, EXTENDED RELEASE TOPICAL at 09:05

## 2019-10-02 RX ADMIN — PREDNISONE SCH MG: 20 TABLET ORAL at 17:47

## 2019-10-02 RX ADMIN — BUPROPION HYDROCHLORIDE SCH MG: 100 TABLET, FILM COATED ORAL at 13:29

## 2019-10-02 RX ADMIN — CITALOPRAM HYDROBROMIDE SCH MG: 20 TABLET ORAL at 09:37

## 2019-10-02 RX ADMIN — HEPARIN SODIUM SCH UNIT: 5000 INJECTION, SOLUTION INTRAVENOUS; SUBCUTANEOUS at 13:29

## 2019-10-02 RX ADMIN — LISINOPRIL SCH MG: 10 TABLET ORAL at 09:37

## 2019-10-02 RX ADMIN — IPRATROPIUM BROMIDE AND ALBUTEROL SULFATE SCH ML: 2.5; .5 SOLUTION RESPIRATORY (INHALATION) at 13:43

## 2019-10-02 RX ADMIN — RISPERIDONE SCH MG: 1 TABLET ORAL at 21:23

## 2019-10-02 NOTE — PDOC PROGRESS REPORT
Subjective


Progress Note for:: 10/02/19


Subjective:: 


The patient is resting comfortably.  BiPAP mask is displaced as she is sipping 

fluids.  She reports feeling better and is beginning to question when she can go

home.


Reason For Visit: 


RESP FAILURE








Physical Exam


Vital Signs: 


                                        











Temp Pulse Resp BP Pulse Ox


 


 97.7 F   77   18   146/79 H  96 


 


 10/02/19 11:44  10/02/19 11:44  10/02/19 11:44  10/02/19 11:44  10/02/19 11:44








                                 Intake & Output











 10/01/19 10/02/19 10/03/19





 06:59 06:59 06:59


 


Intake Total 50 2410 


 


Output Total  1300 


 


Balance 50 1110 


 


Weight 56.8 kg 58.3 kg 











General appearance: PRESENT: no acute distress, cooperative, well-developed


Mouth exam: PRESENT: moist, tongue midline


Teeth exam: PRESENT: poor dentation


Respiratory exam: PRESENT: prolonged expiratory phas, wheezes - Still with faint

expiratory wheezes bilaterally.  ABSENT: rales, rhonchi


Cardiovascular exam: PRESENT: RRR, +S1, +S2


GI/Abdominal exam: PRESENT: normal bowel sounds, soft.  ABSENT: distended, 

tenderness


Extremities exam: PRESENT: full ROM.  ABSENT: joint swelling, pedal edema


Musculoskeletal exam: PRESENT: normal inspection


Neurological exam: PRESENT: alert, awake, oriented to person, oriented to place,

oriented to time, oriented to situation, CN II-XII grossly intact


Psychiatric exam: PRESENT: appropriate affect.  ABSENT: agitated, anxious


Focused psych exam: ABSENT: delusional, restlessness





Results


Laboratory Results: 


                                        





                                 10/01/19 06:06 





                                 10/01/19 05:15 





                                        





09/30/19 21:46   Clean Catch Midstream   Urine Culture - Final


                            Mixed Urogenital Anitra





                                        











  09/30/19





  19:21


 


Troponin I  < 0.012


 


NT-Pro-B Natriuret Pep  4350 H











Impressions: 


                                        





Chest X-Ray  09/30/19 19:20


IMPRESSION:


 


1. Cardiomegaly. Underlying COPD/emphysema. Infiltrates in the


right lower lobe and left perihilar region. These could represent


infectious pneumonia or more chronic interstitial process. CT may


be more accurate for evaluation of lung parenchyma.


2. 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


finding since 2015.


 








Chest/Abdomen CTA  09/30/19 21:37


IMPRESSION:


 


No acute pulmonary embolism. Trace right pleural effusion.


Extensive emphysematous changes.


 














Assessment and Plan





- Diagnosis


(1) PNA (pneumonia)


Is this a current diagnosis for this admission?: Yes   





(2) Respiratory failure


Qualifiers: 


   Chronicity: acute   Respiratory failure complication: hypercapnia   Qualified

Code(s): J96.02 - Acute respiratory failure with hypercapnia   


Is this a current diagnosis for this admission?: Yes   





(3) Congestive heart failure


Qualifiers: 


   Heart failure type: unspecified 


Is this a current diagnosis for this admission?: Yes   





(4) COPD exacerbation


Is this a current diagnosis for this admission?: Yes   





(5) Hyponatremia


Is this a current diagnosis for this admission?: Yes   





(6) Hypertension


Qualifiers: 


   Hypertension type: essential hypertension   Qualified Code(s): I10 - 

Essential (primary) hypertension   


Is this a current diagnosis for this admission?: Yes   





(7) Hyperkalemia


Is this a current diagnosis for this admission?: Yes   





- Plan Summary


Summary: 


* Pneumonia-pneumonia order set instituted.  IV antibiotics, steroids, 

  supplemental oxygen as well as nebulizer treatments.


* Acute on chronic respiratory failure with hypoxia and hypercapnia-PCO2 was 118

  on admission.  This morning she in fact is resting comfortably and mentating 

  fairly clearly with a PCO2 of 83.  She is wearing her BiPAP.  This likely 

  indicates her baseline PCO2 above 50.  Continue current treatment plan.


* COPD exacerbation-see plan above


* Hyponatremia-IV fluids.  Monitor sodium.


* Congestive heart failure-echocardiogram has been ordered.  Will better be able

  to delineate etiology and treatment plan once this information is available.


* Hypertension-continue lisinopril.  She may benefit from beta-blockade for her 

  congestive heart failure.





10/2/2019-we will continue to wean from BiPAP.  I have ordered nasal cannula 

with a repeat blood gas in approximately 2 to 3 hours.  The patient should 

remain on BiPAP overnight for tonight.


We will continue gentle fluids to monitor her sodium and possibly correct the 

hyperkalemia.  We will continue to monitor electrolytes.


The echocardiogram results are not available to better delineate congestive 

failure.


I did add low-dose beta-blocker to her lisinopril for congestive heart failure.


Nebulizers and steroids for her COPD.  I discussed adjusting oxygen flow to keep

oxygen saturation between 88 and 92%.





- Time


Time Spent with patient: 15-24 minutes


Medications reviewed and adjusted accordingly: Yes


Anticipated discharge: Home with Homehealth

## 2019-10-02 NOTE — XCELERA REPORT
[address] [address]

                               Tel: [phone]

                               Fax: [phone]



                      Transthoracic Echocardiogram Report

_______________________________________________________________________________



Name: CICI KIRKPATRICK

MRN: [id number]                           Age: 54 yrs

Gender: Female                            : 1965

Patient Status: Inpatient                 Patient Location: [address] [place] [address]

Account #: [id number]

Study Date: 10/01/2019 10:29 AM

Accession #: [id number]

_______________________________________________________________________________



Height: 65 in        Weight: 141 lb        BSA: 1.7 m2

_______________________________________________________________________________

Procedure: A two-dimensional transthoracic echocardiogram with color flow and

Doppler was performed. The study was technically limited with all images being

suboptimal in quality. Images were not obtained from all of the standard

acoustic windows due to the limited scope of the study.

Reason For Study: systolic murmur



History: systolic murmur.

Ordering Physician: LILLIE ABDULLAHI



Performed By: Richelle Hernandes

_______________________________________________________________________________



Interpretation Summary

The left ventricle is normal in size.

There is normal left ventricular wall thickness.

No 'True 2 chamber ' views obtained.Hence cannot comment on the apical and

basal inferior ,and the apical and basal anterior walls.The mid anterior ,the

mid inferior , and the rest of the LV waaaalls contract normally.The LVEF in

these limited views is normal and greater than 60%.

Doppler measurements suggest normal left ventricular diastolic function

There is no thrombus.

The right ventricle is moderately dilated.

The right ventricular systolic function is mild to moderately reduced.

The right atrium is mild to moderately dilated.

The left atrial size is normal.

The interatrial septum is intact with no evidence for an atrial septal defect.

There is no mitral valve stenosis.

There is no evidence of mitral valve prolapse.

There is no vegetation seen on the mitral valve.

There is a mild amount of mitral regurgitation

There is no aortic valvular vegetation.

There is aortic sclerosis without aortic stenosis.

There is no LVOT obstruction.

No aortic regurgitation is present.

There is no tricuspid stenosis.

There is a moderate to severe amount of tricuspid regurgitation

There is servere pulmonary hypertension by echo

rvsp IS ATLEAST 67 MM OF hG , WITH raMEAN OF AT LEAST 20.

There is no pulmonic valvular stenosis.

There is a mild amount of pulmonic regurgitation

The aortic root is normal size.

The inferior vena cava appeared dilated and did not change with respiration

(RAP > 20 mmHg)

There is no pericardial effusion.



MMode/2D Measurements & Calculations

RVDd: 3.3 cm   LVIDd: 4.5 cm   FS: 29.3 %             Ao root diam: 2.0 cm



IVSd: 0.81 cm  LVIDs: 3.2 cm   EDV(Teich): 92.3 ml    Ao root area: 3.2 cm2

               LVPWd: 0.81 cm  ESV(Teich): 40.3 ml    LA dimension: 3.3 cm

                               EF(Teich): 56.3 %



Doppler Measurements & Calculations

MV E max stephan:      MV P1/2t max stephan:     Ao V2 max:         LV V1 max P.5 cm/sec       116.5 cm/sec          181.2 cm/sec       6.2 mmHg

MV A max stephan:      MV P1/2t: 58.7 msec   Ao max PG:         LV V1 max:

97.2 cm/sec        MVA(P1/2t): 3.7 cm2   13.1 mmHg          124.4 cm/sec

MV E/A: 1.2        MV dec slope:



                   580.8 cm/sec2

                   MV dec time: 0.19 sec

        _______________________________________________________________

PA V2 max:         TR max stephan:           MV P1/2t-pr_phl:

120.9 cm/sec       342.1 cm/sec          58.7 msec

PA max P.8 mmHgTR max P.8 mmHg





Left Ventricle

The left ventricle is normal in size. There is normal left ventricular wall

thickness. No 'True 2 chamber ' views obtained.Hence cannot comment on the

apical and basal inferior ,and the apical and basal anterior walls.The mid

anterior ,the mid inferior , and the rest of the LV waaaalls contract

normally.The LVEF in these limited views is normal and greater than 60%.

Doppler measurements suggest normal left ventricular diastolic function. There

is no thrombus. There is no ventricular septal defect visualized.



Right Ventricle

The right ventricle is moderately dilated. The right ventricular systolic

function is mild to moderately reduced.



Atria

The right atrium is mild to moderately dilated. The left atrial size is

normal. The interatrial septum is intact with no evidence for an atrial septal

defect. There is no Doppler evidence for an interatrial shunt.



Mitral Valve

There is no evidence of mitral valve prolapse. There is no vegetation seen on

the mitral valve. There is no mitral valve stenosis. There is a mild amount of

mitral regurgitation.





Aortic Valve

There is no aortic valvular vegetation. There is aortic sclerosis without

aortic stenosis. There is no LVOT obstruction. No aortic regurgitation is

present.



Tricuspid Valve

There is no tricuspid stenosis. There is a moderate to severe amount of

tricuspid regurgitation. There is servere pulmonary hypertension by echo. rvsp

IS ATLEAST 67 MM OF hG , WITH raMEAN OF AT LEAST 20.



Pulmonic Valve

There is no pulmonic valvular stenosis. There is a mild amount of pulmonic

regurgitation.



Great Vessels

The aortic root is normal size. The inferior vena cava appeared dilated and

did not change with respiration (RAP > 20 mmHg).



Effusions

There is no pericardial effusion.





_______________________________________________________________________________

_______________________________________________________________________________



Electronically signed by:      Eleanor Cha      on 10/02/2019 08:33 PM



CC: LILLIE ABDULLAHI, Eleanor

## 2019-10-03 VITALS — DIASTOLIC BLOOD PRESSURE: 69 MMHG | SYSTOLIC BLOOD PRESSURE: 153 MMHG

## 2019-10-03 LAB
ABSOLUTE LYMPHOCYTES# (MANUAL): 1.1 10^3/UL (ref 0.5–4.7)
ABSOLUTE MONOCYTES # (MANUAL): 0.4 10^3/UL (ref 0.1–1.4)
ADD MANUAL DIFF: YES
ANION GAP SERPL CALC-SCNC: 3 MMOL/L (ref 5–19)
ANISOCYTOSIS BLD QL SMEAR: SLIGHT
BASOPHILS NFR BLD MANUAL: 0 % (ref 0–2)
BUN SERPL-MCNC: 11 MG/DL (ref 7–20)
CALCIUM: 8.7 MG/DL (ref 8.4–10.2)
CHLORIDE SERPL-SCNC: 91 MMOL/L (ref 98–107)
CO2 SERPL-SCNC: 41 MMOL/L (ref 22–30)
EOSINOPHIL NFR BLD MANUAL: 0 % (ref 0–6)
ERYTHROCYTE [DISTWIDTH] IN BLOOD BY AUTOMATED COUNT: 15.7 % (ref 11.5–14)
GLUCOSE SERPL-MCNC: 89 MG/DL (ref 75–110)
HCT VFR BLD CALC: 37.6 % (ref 36–47)
HGB BLD-MCNC: 11.7 G/DL (ref 12–15.5)
MCH RBC QN AUTO: 24.8 PG (ref 27–33.4)
MCHC RBC AUTO-ENTMCNC: 31.2 G/DL (ref 32–36)
MCV RBC AUTO: 79 FL (ref 80–97)
MONOCYTES % (MANUAL): 8 % (ref 3–13)
OVALOCYTES BLD QL SMEAR: SLIGHT
PLATELET # BLD: 317 10^3/UL (ref 150–450)
PLATELET COMMENT: ADEQUATE
POIKILOCYTOSIS BLD QL SMEAR: SLIGHT
POTASSIUM SERPL-SCNC: 4.6 MMOL/L (ref 3.6–5)
RBC # BLD AUTO: 4.73 10^6/UL (ref 3.72–5.28)
SEGMENTED NEUTROPHILS % (MAN): 70 % (ref 42–78)
TOTAL CELLS COUNTED BLD: 100
VARIANT LYMPHS NFR BLD MANUAL: 22 % (ref 13–45)
WBC # BLD AUTO: 5.2 10^3/UL (ref 4–10.5)

## 2019-10-03 PROCEDURE — 3E0234Z INTRODUCTION OF SERUM, TOXOID AND VACCINE INTO MUSCLE, PERCUTANEOUS APPROACH: ICD-10-PCS | Performed by: INTERNAL MEDICINE

## 2019-10-03 RX ADMIN — IPRATROPIUM BROMIDE AND ALBUTEROL SULFATE SCH ML: 2.5; .5 SOLUTION RESPIRATORY (INHALATION) at 02:09

## 2019-10-03 RX ADMIN — HEPARIN SODIUM SCH UNIT: 5000 INJECTION, SOLUTION INTRAVENOUS; SUBCUTANEOUS at 06:30

## 2019-10-03 RX ADMIN — BUDESONIDE SCH MG: 0.5 SUSPENSION RESPIRATORY (INHALATION) at 08:37

## 2019-10-03 RX ADMIN — NICOTINE SCH EACH: 21 PATCH, EXTENDED RELEASE TOPICAL at 10:10

## 2019-10-03 RX ADMIN — IPRATROPIUM BROMIDE AND ALBUTEROL SULFATE SCH ML: 2.5; .5 SOLUTION RESPIRATORY (INHALATION) at 13:55

## 2019-10-03 RX ADMIN — METOPROLOL SUCCINATE SCH MG: 25 TABLET, EXTENDED RELEASE ORAL at 10:07

## 2019-10-03 RX ADMIN — BUPROPION HYDROCHLORIDE SCH MG: 100 TABLET, FILM COATED ORAL at 13:20

## 2019-10-03 RX ADMIN — BUPROPION HYDROCHLORIDE SCH MG: 100 TABLET, FILM COATED ORAL at 10:07

## 2019-10-03 RX ADMIN — HEPARIN SODIUM SCH UNIT: 5000 INJECTION, SOLUTION INTRAVENOUS; SUBCUTANEOUS at 13:20

## 2019-10-03 RX ADMIN — IPRATROPIUM BROMIDE AND ALBUTEROL SULFATE SCH ML: 2.5; .5 SOLUTION RESPIRATORY (INHALATION) at 08:32

## 2019-10-03 RX ADMIN — CITALOPRAM HYDROBROMIDE SCH MG: 20 TABLET ORAL at 10:08

## 2019-10-03 RX ADMIN — LISINOPRIL SCH MG: 10 TABLET ORAL at 10:08

## 2019-10-03 RX ADMIN — FLUTICASONE PROPIONATE SCH SPR: 50 SPRAY, METERED NASAL at 10:02

## 2019-10-03 RX ADMIN — TIOTROPIUM BROMIDE SCH CAP: 18 CAPSULE ORAL; RESPIRATORY (INHALATION) at 10:03

## 2019-10-03 RX ADMIN — PREDNISONE SCH MG: 20 TABLET ORAL at 10:07

## 2019-10-03 RX ADMIN — RISPERIDONE SCH MG: 1 TABLET ORAL at 10:08

## 2019-10-03 RX ADMIN — ARIPIPRAZOLE SCH MG: 5 TABLET ORAL at 10:08

## 2019-10-03 NOTE — PDOC DISCHARGE SUMMARY
Impression





- Admit/DC Date/PCP


Admission Date/Primary Care Provider: 


  09/30/19 22:57





  





Discharge Date: 10/03/19





- Discharge Diagnosis


(1) PNA (pneumonia)


Is this a current diagnosis for this admission?: Yes   





(2) Respiratory failure


Is this a current diagnosis for this admission?: Yes   





(3) Congestive heart failure


Is this a current diagnosis for this admission?: Yes   





(4) Cor pulmonale


Is this a current diagnosis for this admission?: Yes   





(5) COPD exacerbation


Is this a current diagnosis for this admission?: Yes   





(6) Hyponatremia


Is this a current diagnosis for this admission?: Yes   





(7) Hypertension


Is this a current diagnosis for this admission?: Yes   





(8) Hyperkalemia


Is this a current diagnosis for this admission?: Yes   





- Assessment


Summary: 


* Pneumonia-pneumonia order set instituted.  IV antibiotics, steroids, 

  supplemental oxygen as well as nebulizer treatments.


* Acute on chronic respiratory failure with hypoxia and hypercapnia-PCO2 was 118

  on admission.  This morning she in fact is resting comfortably and mentating 

  fairly clearly with a PCO2 of 83.  She is wearing her BiPAP.  This likely 

  indicates her baseline PCO2 above 50.  Continue current treatment plan.


* COPD exacerbation-see plan above


* Hyponatremia-IV fluids.  Monitor sodium.


* Congestive heart failure-echocardiogram has been ordered.  Will better be able

  to delineate etiology and treatment plan once this information is available.


* Hypertension-continue lisinopril.  She may benefit from beta-blockade for her 

  congestive heart failure.





10/2/2019-we will continue to wean from BiPAP.  I have ordered nasal cannula 

with a repeat blood gas in approximately 2 to 3 hours.  The patient should 

remain on BiPAP overnight for tonight.


We will continue gentle fluids to monitor her sodium and possibly correct the 

hyperkalemia.  We will continue to monitor electrolytes.


The echocardiogram results are not available to better delineate congestive 

failure.


I did add low-dose beta-blocker to her lisinopril for congestive heart failure.


Nebulizers and steroids for her COPD.  I discussed adjusting oxygen flow to keep

oxygen saturation between 88 and 92%.





- Additional Information


Resuscitation Status: Full Code


Discharge Diet: Cardiac


Discharge Activity: Activity As Tolerated, Balance Activity w/Rest, Weigh Daily


Referrals: 


Dr. Tan Grajeda MD [Other] - 11/14/19 9:15 am


ANUSHA SALAS PA-C [NO LOCAL MD] - 10/09/19 3:00 pm


Prescriptions: 


Cefuroxime Axetil [Ceftin 500 mg Tablet] 1 tab PO BID 7 Days #14 tablet


Prednisone [Deltasone 20 mg Tablet] 20 mg PO BID 5 Days #10 tablet


Fluticasone Propionate [Flonase Nasal Spray 50 Mcg/Spray 16 gm] 2 spray NASL Q12

#1 bottle


Metoprolol Succinate [Toprol Xl 25 mg Tab.sr] 25 mg PO DAILY 14 Days #14 

tab.sr.24h


Albuterol Sulfate [Ventolin 0.083% Neb 2.5 mg/3 mL Ampul] 1 vial NEB RTQ6 PRN 

#100


 PRN Reason: 


Azithromycin [Zithromax 250 mg Tablet] 250 mg PO DAILY #5 tablet


Home Medications: 








Albuterol Sulfate [Proair HFA Inhalation Aerosol 8.5 gm MDI] 1 puff IH Q4HP PRN 

10/01/19 


Aripiprazole [Abilify 5 mg Tablet] 5 mg PO DAILY 10/01/19 


Bupropion HCl [Wellbutrin Xl 300mg 24hr Tablet] 300 mg PO DAILY 10/01/19 


Citalopram Hydrobromide [Celexa 40 mg Tablet] 40 mg PO DAILY 10/01/19 


Fluticasone/Umeclidin/Vilanter [Trelegy 100-62.5-25 Mcg Ellipta 14 Dose/Dpi] 2 

puff IH BID 10/01/19 


Lisinopril [Prinivil 40 mg Tablet] 20 mg PO DAILY 10/01/19 


Montelukast Sodium [Singulair 10 mg Tablet] 10 mg PO QHS 10/01/19 


Risperidone [Risperdal] 3 mg PO Q12 10/01/19 


Albuterol Sulfate [Ventolin 0.083% Neb 2.5 mg/3 mL Ampul] 1 vial NEB RTQ6 PRN 

#100 10/03/19 


Azithromycin [Zithromax 250 mg Tablet] 250 mg PO DAILY #5 tablet 10/03/19 


Cefuroxime Axetil [Ceftin 500 mg Tablet] 1 tab PO BID 7 Days #14 tablet 10/03/19




Fluticasone Propionate [Flonase Nasal Spray 50 Mcg/Spray 16 gm] 2 spray NASL Q12

#1 bottle 10/03/19 


Metoprolol Succinate [Toprol Xl 25 mg Tab.sr] 25 mg PO DAILY 14 Days #14 

tab.sr.24h 10/03/19 


Nicotine [Nicoderm 21 mg/24 Hr Transderm Patch] 1 each TD DAILY  patch.td24 

10/03/19 


Prednisone [Deltasone 20 mg Tablet] 20 mg PO BID 5 Days #10 tablet 10/03/19 


Psyllium Seed [Metamucil-Sf Powder 5.85 gm Packet] 1 packet PO DAILY  packet 

10/03/19 











History of Present Illiness


History of Present Illness: 


CICI KIRKPATRICK is a 54 year old female with severe chronic obstructive 

pulmonary disease presented with hypoxic hypercapnic acute on chronic 

respiratory failure requiring BiPAP.  She was also found to have infiltrates by 

chest x-ray and no elevation in her white blood cell count.  She seemed to be 

responding to BiPAP and her PCO2 of 118 began to improve.  Surprisingly for such

an elevated PCO2 she was mentating quite clearly.  She was referred to the 

hospital service for admission.








Hospital Course


Hospital Course: 


The patient had a fairly unremarkable hospital course.  Further evaluation of 

her condition by echocardiogram revealed significant pulmonary hypertension with

cor pulmonale.  In addition a set of PFTs showed severe obstructive pulmonary 

disease.  She has improved and is back to her baseline oxygen.  She in fact 

feels that she is back to her chronic state.  Her  agrees that discharge 

to home is appropriate at this time.





Physical Exam


Vital Signs: 


                                        











Temp Pulse Resp BP Pulse Ox


 


 97.1 F   88   18   153/69 H  93 


 


 10/03/19 07:45  10/03/19 14:00  10/03/19 13:56  10/03/19 07:45  10/03/19 13:56








                                 Intake & Output











 10/02/19 10/03/19 10/04/19





 06:59 06:59 06:59


 


Intake Total 2410 640 480


 


Output Total 1300 1700 


 


Balance 1110 -1060 480


 


Weight 58.3 kg 54.4 kg 











General appearance: PRESENT: no acute distress, cooperative, thin, well-

developed


Mouth exam: PRESENT: moist, neck supple, tongue midline


Teeth exam: PRESENT: poor dentation


Respiratory exam: PRESENT: prolonged expiratory phas, symmetrical, unlabored, 

wheezes - Very faint sporadic wheeze.  ABSENT: rales, rhonchi, tachypnea


Cardiovascular exam: PRESENT: RRR, +S1, +S2


GI/Abdominal exam: PRESENT: normal bowel sounds, soft.  ABSENT: distended, 

tenderness


Extremities exam: ABSENT: pedal edema


Neurological exam: PRESENT: alert, awake, oriented to person, oriented to place,

oriented to time, oriented to situation, CN II-XII grossly intact


Psychiatric exam: PRESENT: normal mood.  ABSENT: agitated, anxious


Focused psych exam: ABSENT: delusional, restlessness





Results


Laboratory Results: 


                                        











WBC  5.2 10^3/uL (4.0-10.5)  D 10/03/19  06:23    


 


RBC  4.73 10^6/uL (3.72-5.28)   10/03/19  06:23    


 


Hgb  11.7 g/dL (12.0-15.5)  L  10/03/19  06:23    


 


Hct  37.6 % (36.0-47.0)   10/03/19  06:23    


 


MCV  79 fl (80-97)  L  10/03/19  06:23    


 


MCH  24.8 pg (27.0-33.4)  L  10/03/19  06:23    


 


MCHC  31.2 g/dL (32.0-36.0)  L  10/03/19  06:23    


 


RDW  15.7 % (11.5-14.0)  H  10/03/19  06:23    


 


Plt Count  317 10^3/uL (150-450)   10/03/19  06:23    


 


Lymph % (Auto)  Not Reportable   10/03/19  06:23    


 


Mono % (Auto)  Not Reportable   10/03/19  06:23    


 


Eos % (Auto)  Not Reportable   10/03/19  06:23    


 


Baso % (Auto)  Not Reportable   10/03/19  06:23    


 


Absolute Neuts (auto)  Not Reportable   10/03/19  06:23    


 


Absolute Lymphs (auto)  Not Reportable   10/03/19  06:23    


 


Absolute Monos (auto)  Not Reportable   10/03/19  06:23    


 


Absolute Eos (auto)  Not Reportable   10/03/19  06:23    


 


Absolute Basos (auto)  Not Reportable   10/03/19  06:23    


 


Total Counted  100   10/03/19  06:23    


 


Seg Neutrophils %  Not Reportable   10/03/19  06:23    


 


Seg Neuts % (Manual)  70 % (42-78)   10/03/19  06:23    


 


Lymphocytes % (Manual)  22 % (13-45)   10/03/19  06:23    


 


Monocytes % (Manual)  8 % (3-13)   10/03/19  06:23    


 


Eosinophils % (Manual)  0 % (0-6)   10/03/19  06:23    


 


Basophils % (Manual)  0 % (0-2)   10/03/19  06:23    


 


Abs Neuts (Manual)  3.6 10^3/uL (1.7-8.2)   10/03/19  06:23    


 


Abs Lymphs (Manual)  1.1 10^3/uL (0.5-4.7)   10/03/19  06:23    


 


Abs Monocytes (Manual)  0.4 10^3/uL (0.1-1.4)   10/03/19  06:23    


 


Absolute Eos (Manual)  0.0 10^3/uL (0.0-0.6)   10/03/19  06:23    


 


Abs Basophils (Manual)  0.0 10^3/uL (0.0-0.2)   10/03/19  06:23    


 


Platelet Estimate  Cancelled   10/01/19  05:15    


 


Platelet Comment  ADEQUATE   10/03/19  06:23    


 


Poikilocytosis  SLIGHT   10/03/19  06:23    


 


Anisocytosis  SLIGHT   10/03/19  06:23    


 


Ovalocytes  SLIGHT   10/03/19  06:23    


 


Carbonic Acid  2.31 mmol/L (1.05-1.35)  H  10/02/19  17:50    


 


HCO3/H2CO3 Ratio  18:1   10/02/19  17:50    


 


ABG pH  7.37  (7.35-7.45)   10/02/19  17:50    


 


ABG pCO2  76.8 mmHg (35-45)  H*  10/02/19  17:50    


 


ABG pO2  72.9 mmHg ()  L  10/02/19  17:50    


 


ABG HCO3  43.6 mmol/L (20-24)  H  10/02/19  17:50    


 


ABG Total CO2  46.0 mmol/L (21-25)  H  10/02/19  17:50    


 


ABG O2 Saturation  93.5 % (94-98)  L  10/02/19  17:50    


 


ABG Base Excess  15.0 mmol/L  10/02/19  17:50    


 


FiO2  3L   10/02/19  17:50    


 


Sodium  134.8 mmol/L (137-145)  L  10/03/19  06:23    


 


Potassium  4.6 mmol/L (3.6-5.0)   10/03/19  06:23    


 


Chloride  91 mmol/L ()  L  10/03/19  06:23    


 


Carbon Dioxide  41 mmol/L (22-30)  H*  10/03/19  06:23    


 


Anion Gap  3  (5-19)  L  10/03/19  06:23    


 


BUN  11 mg/dL (7-20)   10/03/19  06:23    


 


Creatinine  0.77 mg/dL (0.52-1.25)   10/03/19  06:23    


 


Est GFR ( Amer)  > 60  (>60)   10/03/19  06:23    


 


Est GFR (MDRD) Non-Af  > 60  (>60)   10/03/19  06:23    


 


Glucose  89 mg/dL ()   10/03/19  06:23    


 


POC Glucose  140 mg/dL ()  H  09/30/19  20:04    


 


Lactic Acid  0.6 mmol/L (0.7-2.1)  L  09/30/19  20:33    


 


Calcium  8.7 mg/dL (8.4-10.2)   10/03/19  06:23    


 


Magnesium  2.1 mg/dL (1.6-2.3)   10/03/19  06:23    


 


Total Bilirubin  0.2 mg/dL (0.2-1.3)   09/30/19  19:21    


 


Direct Bilirubin  0.1 mg/dL (0.0-0.4)   09/30/19  19:21    


 


Neonat Total Bilirubin  Not Reportable   09/30/19  19:21    


 


Neonat Direct Bilirubin  Not Reportable   09/30/19  19:21    


 


Neonat Indirect Bili  Not Reportable   09/30/19  19:21    


 


AST  31 U/L (14-36)   09/30/19  19:21    


 


ALT  23 U/L (<35)   09/30/19  19:21    


 


Alkaline Phosphatase  87 U/L ()   09/30/19  19:21    


 


Troponin I  < 0.012 ng/mL  09/30/19  19:21    


 


NT-Pro-B Natriuret Pep  6360 pg/mL (5-900)  H  10/03/19  06:23    


 


Total Protein  5.3 g/dL (6.3-8.2)  L  09/30/19  19:21    


 


Albumin  3.1 g/dL (3.5-5.0)  L  09/30/19  19:21    


 


TSH  0.51 uIU/mL (0.47-4.68)   09/30/19  19:21    


 


Urine Color  STRAW   09/30/19  21:46    


 


Urine Appearance  CLEAR   09/30/19  21:46    


 


Urine pH  6.0  (5.0-9.0)   09/30/19  21:46    


 


Ur Specific Gravity  1.004   09/30/19  21:46    


 


Urine Protein  NEGATIVE mg/dL (NEGATIVE)   09/30/19  21:46    


 


Urine Glucose (UA)  NEGATIVE mg/dL (NEGATIVE)   09/30/19  21:46    


 


Urine Ketones  NEGATIVE mg/dL (NEGATIVE)   09/30/19  21:46    


 


Urine Blood  NEGATIVE  (NEGATIVE)   09/30/19  21:46    


 


Urine Nitrite  NEGATIVE  (NEGATIVE)   09/30/19  21:46    


 


Urine Bilirubin  NEGATIVE  (NEGATIVE)   09/30/19  21:46    


 


Urine Urobilinogen  NEGATIVE mg/dL (<2.0)   09/30/19  21:46    


 


Ur Leukocyte Esterase  NEGATIVE  (NEGATIVE)   09/30/19  21:46    


 


Urine WBC (Auto)  1 /HPF  09/30/19  21:46    


 


Urine RBC (Auto)  1 /HPF  09/30/19  21:46    


 


Squamous Epi Cells Auto  2 /HPF  09/30/19  21:46    


 


Urine Mucus (Auto)  RARE /LPF  09/30/19  21:46    


 


Urine Ascorbic Acid  NEGATIVE  (NEGATIVE)   09/30/19  21:46    


 


Urine Opiates Screen  NEGATIVE   09/30/19  21:46    


 


Urine Methadone Screen  NEGATIVE   09/30/19  21:46    


 


Ur Barbiturates Screen  NEGATIVE   09/30/19  21:46    


 


Ur Phencyclidine Scrn  NEGATIVE   09/30/19  21:46    


 


Ur Amphetamines Screen  NEGATIVE   09/30/19  21:46    


 


U Benzodiazepines Scrn  NEGATIVE   09/30/19  21:46    


 


Urine Cocaine Screen  NEGATIVE   09/30/19  21:46    


 


U Marijuana (THC) Screen  UNCONFIRMED POSITIVE   09/30/19  21:46    


 


Slides for Path Review  Cancelled   10/01/19  05:15    








                                        











  09/30/19 10/03/19





  19:21 06:23


 


Troponin I  < 0.012 


 


NT-Pro-B Natriuret Pep  4350 H  6360 H











Impressions: 


                                        





Chest X-Ray  09/30/19 19:20


IMPRESSION:


 


1. Cardiomegaly. Underlying COPD/emphysema. Infiltrates in the


right lower lobe and left perihilar region. These could represent


infectious pneumonia or more chronic interstitial process. CT may


be more accurate for evaluation of lung parenchyma.


2. 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


finding since 2015.


 








Chest/Abdomen CTA  09/30/19 21:37


IMPRESSION:


 


No acute pulmonary embolism. Trace right pleural effusion.


Extensive emphysematous changes.


 














Plan


Health Concerns: 


End-stage COPD with chronic hypercapnia


Plan of Treatment: 


The patient will discharge to home.  She will complete her antibiotic therapy.  

She in fact was taking Trelegy and Brio Ellipta and I told her that she should 

not take both.  She will continue the Trelegy.  I also provided a prescription 

for unit dose DuoNeb treatments.  I encouraged them to obtain a pulse oximeter 

and that she should keep her oxygen saturation between 88 and 92% at all times. 

She will follow-up with her pulmonologist Dr. Grajeda in [place] as well as 

her primary care provider.  Prescriptions were sent electronically to the [facility] in

[place].


Goals: 


To achieve steady state with her COPD.  She needs to stop smoking.


Time Spent: Greater than 30 Minutes





Stroke


Is this a Stroke Patient?: No





Acute Heart Failure





- **


Is this a Heart Failure Patient?: Yes


Documentation of LVEF assessment?: Yes


LVEF < 40%?: No- if no continue to question #3


3. Anticoagulant therapy for permanect/persistent/paraoxysmal Afib or Aflutter: 

N/A


Follow-up Appointment scheduled within 7 days?: Yes

## 2020-02-21 ENCOUNTER — HOSPITAL ENCOUNTER (INPATIENT)
Dept: HOSPITAL 62 - ER | Age: 55
LOS: 5 days | Discharge: HOME | DRG: 190 | End: 2020-02-26
Attending: FAMILY MEDICINE | Admitting: FAMILY MEDICINE
Payer: COMMERCIAL

## 2020-02-21 DIAGNOSIS — J44.1: Primary | ICD-10-CM

## 2020-02-21 DIAGNOSIS — F31.9: ICD-10-CM

## 2020-02-21 DIAGNOSIS — I50.9: ICD-10-CM

## 2020-02-21 DIAGNOSIS — E11.9: ICD-10-CM

## 2020-02-21 DIAGNOSIS — E87.1: ICD-10-CM

## 2020-02-21 DIAGNOSIS — J96.22: ICD-10-CM

## 2020-02-21 DIAGNOSIS — Z79.899: ICD-10-CM

## 2020-02-21 DIAGNOSIS — F17.210: ICD-10-CM

## 2020-02-21 DIAGNOSIS — J96.21: ICD-10-CM

## 2020-02-21 DIAGNOSIS — Z99.81: ICD-10-CM

## 2020-02-21 DIAGNOSIS — E44.0: ICD-10-CM

## 2020-02-21 LAB
ADD MANUAL DIFF: NO
ALBUMIN SERPL-MCNC: 3.5 G/DL (ref 3.5–5)
ALP SERPL-CCNC: 76 U/L (ref 38–126)
ANION GAP SERPL CALC-SCNC: 6 MMOL/L (ref 5–19)
APPEARANCE UR: CLEAR
APTT BLD: 26.5 SEC (ref 23.5–35.8)
APTT PPP: COLORLESS S
ARTERIAL BLOOD FIO2: (no result)
ARTERIAL BLOOD H2CO3: 2.21 MMOL/L (ref 1.05–1.35)
ARTERIAL BLOOD HCO3: 39.3 MMOL/L (ref 20–24)
ARTERIAL BLOOD PCO2: 73.4 MMHG (ref 35–45)
ARTERIAL BLOOD PH: 7.35 (ref 7.35–7.45)
ARTERIAL BLOOD PO2: 108.8 MMHG (ref 80–100)
ARTERIAL BLOOD TOTAL CO2: 41.6 MMOL/L (ref 21–25)
AST SERPL-CCNC: 34 U/L (ref 14–36)
BASE EXCESS BLDA CALC-SCNC: 10.8 MMOL/L
BASOPHILS # BLD AUTO: 0 10^3/UL (ref 0–0.2)
BASOPHILS NFR BLD AUTO: 0.2 % (ref 0–2)
BILIRUB DIRECT SERPL-MCNC: 0.1 MG/DL (ref 0–0.4)
BILIRUB SERPL-MCNC: 0.3 MG/DL (ref 0.2–1.3)
BILIRUB UR QL STRIP: NEGATIVE
BUN SERPL-MCNC: 17 MG/DL (ref 7–20)
CALCIUM: 9.1 MG/DL (ref 8.4–10.2)
CHLORIDE SERPL-SCNC: 82 MMOL/L (ref 98–107)
CO2 SERPL-SCNC: 38 MMOL/L (ref 22–30)
EOSINOPHIL # BLD AUTO: 0 10^3/UL (ref 0–0.6)
EOSINOPHIL NFR BLD AUTO: 0.4 % (ref 0–6)
ERYTHROCYTE [DISTWIDTH] IN BLOOD BY AUTOMATED COUNT: 16.1 % (ref 11.5–14)
GLUCOSE SERPL-MCNC: 84 MG/DL (ref 75–110)
GLUCOSE UR STRIP-MCNC: NEGATIVE MG/DL
HCT VFR BLD CALC: 40.3 % (ref 36–47)
HGB BLD-MCNC: 12.9 G/DL (ref 12–15.5)
INR PPP: 0.92
KETONES UR STRIP-MCNC: NEGATIVE MG/DL
LYMPHOCYTES # BLD AUTO: 1.2 10^3/UL (ref 0.5–4.7)
LYMPHOCYTES NFR BLD AUTO: 9.9 % (ref 13–45)
MCH RBC QN AUTO: 26.8 PG (ref 27–33.4)
MCHC RBC AUTO-ENTMCNC: 32 G/DL (ref 32–36)
MCV RBC AUTO: 84 FL (ref 80–97)
MONOCYTES # BLD AUTO: 1 10^3/UL (ref 0.1–1.4)
MONOCYTES NFR BLD AUTO: 8.1 % (ref 3–13)
NEUTROPHILS # BLD AUTO: 9.7 10^3/UL (ref 1.7–8.2)
NEUTS SEG NFR BLD AUTO: 81.4 % (ref 42–78)
PH UR STRIP: 6 [PH] (ref 5–9)
PLATELET # BLD: 314 10^3/UL (ref 150–450)
POTASSIUM SERPL-SCNC: 3.9 MMOL/L (ref 3.6–5)
PROT SERPL-MCNC: 5.7 G/DL (ref 6.3–8.2)
PROT UR STRIP-MCNC: NEGATIVE MG/DL
PROTHROMBIN TIME: 12.4 SEC (ref 11.4–15.4)
RBC # BLD AUTO: 4.8 10^6/UL (ref 3.72–5.28)
SAO2 % BLDA: 97.5 % (ref 94–98)
SP GR UR STRIP: 1
TOTAL CELLS COUNTED % (AUTO): 100 %
UROBILINOGEN UR-MCNC: NEGATIVE MG/DL (ref ?–2)
WBC # BLD AUTO: 11.9 10^3/UL (ref 4–10.5)

## 2020-02-21 PROCEDURE — 94660 CPAP INITIATION&MGMT: CPT

## 2020-02-21 PROCEDURE — 82803 BLOOD GASES ANY COMBINATION: CPT

## 2020-02-21 PROCEDURE — 96375 TX/PRO/DX INJ NEW DRUG ADDON: CPT

## 2020-02-21 PROCEDURE — 36600 WITHDRAWAL OF ARTERIAL BLOOD: CPT

## 2020-02-21 PROCEDURE — 82553 CREATINE MB FRACTION: CPT

## 2020-02-21 PROCEDURE — 83735 ASSAY OF MAGNESIUM: CPT

## 2020-02-21 PROCEDURE — 83935 ASSAY OF URINE OSMOLALITY: CPT

## 2020-02-21 PROCEDURE — 84484 ASSAY OF TROPONIN QUANT: CPT

## 2020-02-21 PROCEDURE — 85730 THROMBOPLASTIN TIME PARTIAL: CPT

## 2020-02-21 PROCEDURE — 96374 THER/PROPH/DIAG INJ IV PUSH: CPT

## 2020-02-21 PROCEDURE — 36415 COLL VENOUS BLD VENIPUNCTURE: CPT

## 2020-02-21 PROCEDURE — 83930 ASSAY OF BLOOD OSMOLALITY: CPT

## 2020-02-21 PROCEDURE — 85610 PROTHROMBIN TIME: CPT

## 2020-02-21 PROCEDURE — 83880 ASSAY OF NATRIURETIC PEPTIDE: CPT

## 2020-02-21 PROCEDURE — G0378 HOSPITAL OBSERVATION PER HR: HCPCS

## 2020-02-21 PROCEDURE — 83605 ASSAY OF LACTIC ACID: CPT

## 2020-02-21 PROCEDURE — 93010 ELECTROCARDIOGRAM REPORT: CPT

## 2020-02-21 PROCEDURE — 87040 BLOOD CULTURE FOR BACTERIA: CPT

## 2020-02-21 PROCEDURE — 87804 INFLUENZA ASSAY W/OPTIC: CPT

## 2020-02-21 PROCEDURE — 93005 ELECTROCARDIOGRAM TRACING: CPT

## 2020-02-21 PROCEDURE — 80048 BASIC METABOLIC PNL TOTAL CA: CPT

## 2020-02-21 PROCEDURE — 85025 COMPLETE CBC W/AUTO DIFF WBC: CPT

## 2020-02-21 PROCEDURE — 82550 ASSAY OF CK (CPK): CPT

## 2020-02-21 PROCEDURE — 99291 CRITICAL CARE FIRST HOUR: CPT

## 2020-02-21 PROCEDURE — 80053 COMPREHEN METABOLIC PANEL: CPT

## 2020-02-21 PROCEDURE — 71045 X-RAY EXAM CHEST 1 VIEW: CPT

## 2020-02-21 PROCEDURE — 84443 ASSAY THYROID STIM HORMONE: CPT

## 2020-02-21 PROCEDURE — 81001 URINALYSIS AUTO W/SCOPE: CPT

## 2020-02-21 NOTE — ER DOCUMENT REPORT
ED General





- General


Chief Complaint: Respiratory Distress


Stated Complaint: RESPIRATORY DISTRESS


Time Seen by Provider: 20 20:47


TRAVEL OUTSIDE OF THE [place] IN LAST 30 DAYS: No





- HPI


Notes: 





Ms. checo Mensah is a 54-year-old female presenting via EMS with a chief complaint of 

respiratory distress.





Patient is chronically dependent on 3-1/2 L of nasal O2 with longstanding 

history of COPD and CHF.  She unfortunately continues to smoke 1/2 pack of 

cigarettes per day.  She was previously admitted here in 2019 with 

acute respiratory failure requiring prolonged support with BiPAP.  She was noted

to have pneumococcal pneumonia at that time.  Clinical presentation is similar 

today.





Patient was placed on BiPAP in the field by EMS and they also started her on IV 

nitroglycerin and had her up to 40 mcg at the time she was brought in here.  

Patient is awake and alert and denies chest pain.  She notes she has been 

coughing up white sputum for several days.  Indicates she did take a flu shot 

this season.














- Related Data


Allergies/Adverse Reactions: 


                                        





No Known Allergies Allergy (Verified 14 03:01)


   











Past Medical History





- General


Information source: Patient, Emergency Med Personnel, [facility] Records





- Social History


Smoking Status: Current Every Day Smoker


Frequency of alcohol use: None


Drug Abuse: None


Lives with: Family


Family History: COPD, Hypertension





- Past Medical History


Cardiac Medical History: Reports: Hx Congestive Heart Failure, Hx Hypertension


   Denies: Hx Coronary Artery Disease, Hx Heart Attack


Pulmonary Medical History: Reports: Hx Asthma, Hx Bronchitis, Hx COPD, Hx 

Pneumonia


Neurological Medical History: Reports: Hx Seizures - ONCE 3 YEARS AGO.  Denies: 

Hx Cerebrovascular Accident


Renal/ Medical History: Reports: Hx Kidney Stones


Musculoskeletal Medical History: Reports Hx Arthritis


Psychiatric Medical History: Reports: Hx Bipolar Disorder, Hx Depression


Past Surgical History: Reports: Hx  Section, Hx Orthopedic Surgery - 

right knee.  Denies: Hx Hysterectomy, Hx Pacemaker





- Immunizations


Hx Diphtheria, Pertussis, Tetanus Vaccination: No


Hx Pneumococcal Vaccination: 13





Review of Systems





- Review of Systems


Notes: 





Constitutional: Negative for fever.


HENT: Negative for sore throat.


Eyes: Negative for visual changes.


Cardiovascular: Negative for chest pain.


Respiratory: As per HPI.


Gastrointestinal: Negative for abdominal pain, vomiting or diarrhea.


Genitourinary: Negative for dysuria.


Musculoskeletal: Negative for back pain.


Skin: Negative for rash.


Neurological: Negative for headaches, weakness or numbness.





10 point ROS negative except as marked above and in HPI.








Physical Exam





- Vital signs


Vitals: 


                                        











BP Pulse Ox


 


 176/88 H  98 


 


 20 20:27  20 20:27














- Notes


Notes: 











GENERAL: Chronically ill-appearing female approximately stated age on BiPAP with

mild respiratory distress.





SKIN: Good turgor no rashes.





HEAD: Normocephalic atraumatic.





EYES: PERRLA.  EOMI.  Conjunctivae and sclerae clear.





EARS: CANALS AND TMS CLEAR.





NOSE: CLEAR.





MOUTH: Moist mucosa.  Good dentition.  No stridor or edema.  No drooling.





NECK: Supple.  No masses or thyromegaly.  No adenopathy.  Carotids 2+ without 

bruits.  No JVD.





BACK: Symmetrical without tenderness.





CHEST: Patient is being supported with BiPAP.  Scattered rhonchi bilaterally 

with symmetrical breath sounds.





HEART: Regular rhythm.  No murmur gallop or rub.





ABDOMEN: Soft nontender without masses, organomegaly or rebound.  Bowel sounds 

normally active.  No bruits.





GENITALIA: Deferred.





EXTREMITIES: 2+ clubbing upper extremities bilaterally no edema.  No calf 

tenderness.  Cap refill less than 1.5 seconds.  Dorsalis pedis and posterior 

tibial pulses 3+ and symmetrical.





NEUROLOGICAL: GCS 15.  Alert and oriented x3.  Fluent speech.  Cranial nerves II

through XII intact.  Sensorimotor and cerebellar normal.  Normal tone.





PSYCHIATRIC: Appropriate affect.





Course





- Re-evaluation


Re-evalutation: 





20 21:02


We will wean the patient off nitroglycerin continue BiPAP for the time being.  

We will follow this up with an ABG.  I am going to administer Solu-Medrol.  

Chest x-ray and full labs pending.


20 23:32


Normal troponin.  No chest pain here.  We were able to wean this lady off BiPAP 

and get her back on nasal O2.  She still has diffuse wheezes but is in no 

respiratory distress at this time and feels much better.  Case is discussed with

Dr. Weiland the on-call hospitalist who will admit.





- Vital Signs


Vital signs: 


                                        











Temp Pulse Resp BP Pulse Ox


 


       17   115/75   98 


 


       20 22:21  20 22:21  20 22:21














- Laboratory


Result Diagrams: 


                                 20 20:30





                                 20 20:30


Laboratory results interpreted by me: 


                                        











  20





  20:30 20:30 20:30


 


WBC  11.9 H  


 


MCH  26.8 L  


 


RDW  16.1 H  


 


Lymph % (Auto)  9.9 L  


 


Absolute Neuts (auto)  9.7 H  


 


Seg Neutrophils %  81.4 H  


 


Carbonic Acid   


 


ABG pCO2   


 


ABG pO2   


 


ABG HCO3   


 


ABG Total CO2   


 


Sodium   126.2 L 


 


Chloride   82 L 


 


Carbon Dioxide   38 H 


 


Lactic Acid    0.6 L


 


NT-Pro-B Natriuret Pep   


 


Total Protein   5.7 L 














  20





  20:33 21:40


 


WBC  


 


MCH  


 


RDW  


 


Lymph % (Auto)  


 


Absolute Neuts (auto)  


 


Seg Neutrophils %  


 


Carbonic Acid   2.21 H


 


ABG pCO2   73.4 H*


 


ABG pO2   108.8 H


 


ABG HCO3   39.3 H


 


ABG Total CO2   41.6 H


 


Sodium  


 


Chloride  


 


Carbon Dioxide  


 


Lactic Acid  


 


NT-Pro-B Natriuret Pep  605 H 


 


Total Protein  














- EKG Interpretation by Me


Additional EKG results interpreted by me: 





20 21:04


Twelve-lead EKG from 7 hrs. is reviewed contemporaneously by me demonstrating

sinus tachycardia with a rate of 103 and a pre-existing incomplete right bundle 

branch block.  QRS axis is 110 degrees.  No acute ST/T wave changes are 

appreciated.  Tracing is compared to prior study of 2019.





Critical Care Note





- Critical Care Note


Total time excluding time spent on procedures (mins): 35 - Ventilatory support 

with BiPAP





Discharge





- Discharge


Clinical Impression: 


 COPD exacerbation, Cigarette smoker





Acute and chronic respiratory failure (acute-on-chronic)


Qualifiers:


 Respiratory failure complication: hypoxia and hypercapnia Qualified Code(s): 

J96.21 - Acute and chronic respiratory failure with hypoxia; J96.22 - Acute and 

chronic respiratory failure with hypercapnia





Congestive heart failure (CHF)


Qualifiers:


 Heart failure type: unspecified Heart failure chronicity: unspecified Qualified

Code(s): I50.9 - Heart failure, unspecified





Condition: Fair


Disposition: ADMITTED AS INPATIENT


Admitting Provider: Weiland (Hospitalist)


Unit Admitted: Medical Floor

## 2020-02-21 NOTE — RADIOLOGY REPORT (SQ)
EXAM DESCRIPTION: 



XR CHEST 1 VIEW



COMPLETED DATE/TME:  02/21/2020 20:48



CLINICAL HISTORY:  54 years  Female  dyspnea



COMPARISON:  9/30/2019



FINDINGS: 



The cardiomediastinal silhouette appears unremarkable.

No consolidating infiltrates or pleural effusions.

No pneumothorax. 

Lungs are hyperinflated.

Fracture of the proximal left humerus which appears to be stable

when compared to prior exams.





IMPRESSION: COPD



No acute abnormality is identified.

## 2020-02-22 LAB
A TYPE INFLUENZA AG: NEGATIVE
ANION GAP SERPL CALC-SCNC: 8 MMOL/L (ref 5–19)
ANION GAP SERPL CALC-SCNC: 9 MMOL/L (ref 5–19)
B INFLUENZA AG: NEGATIVE
BUN SERPL-MCNC: 16 MG/DL (ref 7–20)
BUN SERPL-MCNC: 17 MG/DL (ref 7–20)
CALCIUM: 8.8 MG/DL (ref 8.4–10.2)
CALCIUM: 9 MG/DL (ref 8.4–10.2)
CHLORIDE SERPL-SCNC: 82 MMOL/L (ref 98–107)
CHLORIDE SERPL-SCNC: 82 MMOL/L (ref 98–107)
CK MB SERPL-MCNC: 10.4 NG/ML (ref ?–4.55)
CK MB SERPL-MCNC: 8.41 NG/ML (ref ?–4.55)
CK MB SERPL-MCNC: 8.72 NG/ML (ref ?–4.55)
CO2 SERPL-SCNC: 39 MMOL/L (ref 22–30)
CO2 SERPL-SCNC: 43 MMOL/L (ref 22–30)
GLUCOSE SERPL-MCNC: 182 MG/DL (ref 75–110)
GLUCOSE SERPL-MCNC: 87 MG/DL (ref 75–110)
POTASSIUM SERPL-SCNC: 4.3 MMOL/L (ref 3.6–5)
POTASSIUM SERPL-SCNC: 4.4 MMOL/L (ref 3.6–5)
TROPONIN I SERPL-MCNC: 0.02 NG/ML
TROPONIN I SERPL-MCNC: 0.02 NG/ML
TROPONIN I SERPL-MCNC: 0.03 NG/ML

## 2020-02-22 PROCEDURE — 5A09457 ASSISTANCE WITH RESPIRATORY VENTILATION, 24-96 CONSECUTIVE HOURS, CONTINUOUS POSITIVE AIRWAY PRESSURE: ICD-10-PCS | Performed by: FAMILY MEDICINE

## 2020-02-22 RX ADMIN — IPRATROPIUM BROMIDE SCH MG: 0.5 SOLUTION RESPIRATORY (INHALATION) at 08:18

## 2020-02-22 RX ADMIN — NITROGLYCERIN SCH GM: 20 OINTMENT TOPICAL at 06:50

## 2020-02-22 RX ADMIN — MORPHINE SULFATE PRN MG: 10 INJECTION INTRAMUSCULAR; INTRAVENOUS; SUBCUTANEOUS at 18:18

## 2020-02-22 RX ADMIN — LEVALBUTEROL HYDROCHLORIDE SCH MG: 1.25 SOLUTION RESPIRATORY (INHALATION) at 16:34

## 2020-02-22 RX ADMIN — DOCUSATE SODIUM SCH MG: 100 CAPSULE, LIQUID FILLED ORAL at 09:31

## 2020-02-22 RX ADMIN — HEPARIN SODIUM SCH: 5000 INJECTION, SOLUTION INTRAVENOUS; SUBCUTANEOUS at 06:07

## 2020-02-22 RX ADMIN — BUPROPION HYDROCHLORIDE SCH MG: 75 TABLET, FILM COATED ORAL at 21:24

## 2020-02-22 RX ADMIN — PANTOPRAZOLE SODIUM SCH MG: 40 TABLET, DELAYED RELEASE ORAL at 06:50

## 2020-02-22 RX ADMIN — Medication SCH ML: at 15:09

## 2020-02-22 RX ADMIN — BENZTROPINE MESYLATE SCH MG: 1 TABLET ORAL at 17:51

## 2020-02-22 RX ADMIN — HEPARIN SODIUM SCH UNIT: 5000 INJECTION, SOLUTION INTRAVENOUS; SUBCUTANEOUS at 21:23

## 2020-02-22 RX ADMIN — BUDESONIDE SCH MG: 0.5 SUSPENSION RESPIRATORY (INHALATION) at 08:18

## 2020-02-22 RX ADMIN — HEPARIN SODIUM SCH UNIT: 5000 INJECTION, SOLUTION INTRAVENOUS; SUBCUTANEOUS at 15:09

## 2020-02-22 RX ADMIN — BUDESONIDE SCH MG: 0.5 SUSPENSION RESPIRATORY (INHALATION) at 19:59

## 2020-02-22 RX ADMIN — LEVALBUTEROL HYDROCHLORIDE SCH MG: 1.25 SOLUTION RESPIRATORY (INHALATION) at 08:18

## 2020-02-22 RX ADMIN — LEVALBUTEROL HYDROCHLORIDE PRN MG: 0.63 SOLUTION RESPIRATORY (INHALATION) at 19:59

## 2020-02-22 RX ADMIN — IPRATROPIUM BROMIDE SCH MG: 0.5 SOLUTION RESPIRATORY (INHALATION) at 16:34

## 2020-02-22 RX ADMIN — CITALOPRAM HYDROBROMIDE SCH MG: 20 TABLET ORAL at 15:09

## 2020-02-22 RX ADMIN — NITROGLYCERIN SCH: 20 OINTMENT TOPICAL at 00:50

## 2020-02-22 RX ADMIN — ARIPIPRAZOLE SCH MG: 5 TABLET ORAL at 15:09

## 2020-02-22 RX ADMIN — METHYLPREDNISOLONE SODIUM SUCCINATE SCH MG: 40 INJECTION, POWDER, FOR SOLUTION INTRAMUSCULAR; INTRAVENOUS at 21:23

## 2020-02-22 RX ADMIN — SPIRONOLACTONE SCH MG: 25 TABLET, FILM COATED ORAL at 01:08

## 2020-02-22 RX ADMIN — METHYLPREDNISOLONE SODIUM SUCCINATE SCH MG: 40 INJECTION, POWDER, FOR SOLUTION INTRAMUSCULAR; INTRAVENOUS at 15:09

## 2020-02-22 RX ADMIN — GUAIFENESIN PRN MG: 200 SOLUTION ORAL at 18:18

## 2020-02-22 RX ADMIN — Medication SCH: at 06:06

## 2020-02-22 RX ADMIN — SPIRONOLACTONE SCH MG: 25 TABLET, FILM COATED ORAL at 09:31

## 2020-02-22 RX ADMIN — IPRATROPIUM BROMIDE SCH: 0.5 SOLUTION RESPIRATORY (INHALATION) at 23:39

## 2020-02-22 RX ADMIN — FUROSEMIDE SCH MG: 20 TABLET ORAL at 15:09

## 2020-02-22 RX ADMIN — RISPERIDONE SCH MG: 1 TABLET ORAL at 21:23

## 2020-02-22 RX ADMIN — Medication SCH ML: at 21:24

## 2020-02-22 RX ADMIN — LEVALBUTEROL HYDROCHLORIDE SCH: 1.25 SOLUTION RESPIRATORY (INHALATION) at 23:39

## 2020-02-22 RX ADMIN — GUAIFENESIN PRN MG: 200 SOLUTION ORAL at 09:31

## 2020-02-22 NOTE — EKG REPORT
SEVERITY:- ABNORMAL ECG -

SINUS TACHYCARDIA

INCOMPLETE RIGHT BUNDLE BRANCH BLOCK

:

Confirmed by: Tiffanie Mcmillan 22-Feb-2020 21:54:43

## 2020-02-22 NOTE — PDOC PROGRESS REPORT
Subjective


Progress Note for:: 02/22/20


Subjective:: 





Breathing mildly improved


Still wheezing


Reason For Visit: 


ACUTE CONGESTIVE HEART FAILURE








Physical Exam


Vital Signs: 


                                        











Temp Pulse Resp BP Pulse Ox


 


 97.6 F   99   18   158/67 H  97 


 


 02/22/20 10:56  02/22/20 10:56  02/22/20 10:56  02/22/20 10:56  02/22/20 10:56








                                 Intake & Output











 02/21/20 02/22/20 02/23/20





 06:59 06:59 06:59


 


Intake Total  360 510


 


Output Total  700 


 


Balance  -340 510


 


Weight  57.8 kg 











General appearance: PRESENT: no acute distress, thin, other - chronically ill 

looking


Head exam: PRESENT: atraumatic


Neck exam: PRESENT: full ROM.  ABSENT: JVD


Respiratory exam: PRESENT: decreased breath sounds, rhonchi, unlabored, wheezes.

 ABSENT: tachypnea


Cardiovascular exam: PRESENT: RRR, +S1, +S2


GI/Abdominal exam: PRESENT: normal bowel sounds, soft


Rectal exam: PRESENT: deferred


Extremities exam: PRESENT: full ROM


Neurological exam: PRESENT: alert, awake, oriented to person, oriented to place,

oriented to time, oriented to situation





Results


Laboratory Results: 


                                        





                                 02/21/20 20:30 





                                 02/22/20 08:32 





                                        











  02/21/20 02/21/20 02/21/20





  20:30 20:30 20:30


 


WBC  11.9 H  


 


RBC  4.80  


 


Hgb  12.9  


 


Hct  40.3  


 


MCV  84  


 


MCH  26.8 L  


 


MCHC  32.0  


 


RDW  16.1 H  


 


Plt Count  314  


 


Seg Neutrophils %  81.4 H  


 


Carbonic Acid   


 


HCO3/H2CO3 Ratio   


 


ABG pH   


 


ABG pCO2   


 


ABG pO2   


 


ABG HCO3   


 


ABG O2 Saturation   


 


ABG Base Excess   


 


FiO2   


 


Sodium   126.2 L 


 


Potassium   3.9 


 


Chloride   82 L 


 


Carbon Dioxide   38 H 


 


Anion Gap   6 


 


BUN   17 


 


Creatinine   0.94 


 


Est GFR ( Amer)   > 60 


 


Glucose   84 


 


Lactic Acid    0.6 L


 


Calcium   9.1 


 


Magnesium   1.8 


 


Total Bilirubin   0.3 


 


AST   34 


 


Alkaline Phosphatase   76 


 


Total Protein   5.7 L 


 


Albumin   3.5 


 


Urine Color   


 


Urine Appearance   


 


Urine pH   


 


Ur Specific Gravity   


 


Urine Protein   


 


Urine Glucose (UA)   


 


Urine Ketones   


 


Urine Blood   


 


Urine RBC (Auto)   














  02/21/20 02/21/20 02/22/20





  21:00 21:40 08:32


 


WBC   


 


RBC   


 


Hgb   


 


Hct   


 


MCV   


 


MCH   


 


MCHC   


 


RDW   


 


Plt Count   


 


Seg Neutrophils %   


 


Carbonic Acid   2.21 H 


 


HCO3/H2CO3 Ratio   17:1 


 


ABG pH   7.35 


 


ABG pCO2   73.4 H* 


 


ABG pO2   108.8 H 


 


ABG HCO3   39.3 H 


 


ABG O2 Saturation   97.5 


 


ABG Base Excess   10.8 


 


FiO2   40% 


 


Sodium    133.5 L


 


Potassium    4.3


 


Chloride    82 L


 


Carbon Dioxide    43 H*


 


Anion Gap    9


 


BUN    16


 


Creatinine    0.98


 


Est GFR ( Amer)    > 60


 


Glucose    182 H


 


Lactic Acid   


 


Calcium    9.0


 


Magnesium   


 


Total Bilirubin   


 


AST   


 


Alkaline Phosphatase   


 


Total Protein   


 


Albumin   


 


Urine Color  COLORLESS  


 


Urine Appearance  CLEAR  


 


Urine pH  6.0  


 


Ur Specific Gravity  1.002  


 


Urine Protein  NEGATIVE  


 


Urine Glucose (UA)  NEGATIVE  


 


Urine Ketones  NEGATIVE  


 


Urine Blood  NEGATIVE  


 


Urine RBC (Auto)  0  








                                        











  02/21/20 02/21/20 02/21/20





  20:30 20:30 20:33


 


Creatine Kinase   136 H 


 


CK-MB (CK-2)   


 


Troponin I  0.026  


 


NT-Pro-B Natriuret Pep    605 H














  02/22/20 02/22/20 02/22/20





  00:30 00:56 05:16


 


Creatine Kinase   89  58


 


CK-MB (CK-2)  10.40 H  


 


Troponin I  0.028  


 


NT-Pro-B Natriuret Pep   














  02/22/20 02/22/20





  05:16 12:32


 


Creatine Kinase  


 


CK-MB (CK-2)  8.41 H  8.72 H


 


Troponin I  0.021  0.019


 


NT-Pro-B Natriuret Pep  











Impressions: 


                                        





Chest X-Ray  02/21/20 20:48


IMPRESSION: COPD


 


No acute abnormality is identified.


 














Assessment and Plan





- Diagnosis


(1) Acute and chronic respiratory failure (acute-on-chronic)


Qualifiers: 


   Respiratory failure complication: hypercapnia   Qualified Code(s): J96.22 - 

Acute and chronic respiratory failure with hypercapnia   


Is this a current diagnosis for this admission?: Yes   





(2) Chronic obstructive pulmonary disease


Qualifiers: 


   COPD type: unspecified COPD   Qualified Code(s): J44.9 - Chronic obstructive 

pulmonary disease, unspecified   


Is this a current diagnosis for this admission?: Yes   





(3) Tobacco use disorder, continuous


Is this a current diagnosis for this admission?: Yes   





(4) Disturbance of acid-base balance


Is this a current diagnosis for this admission?: Yes   


Plan: 


With compensated acidosis 








(5) Hyponatremia


Is this a current diagnosis for this admission?: Yes   


Plan: 


Initial sodium was 126, it is 133 today.  Hyponatremia could be secondary to 

steroid as well as diuretic use.  We will follow-up and recheck in a.m.








(6) Protein-calorie malnutrition, moderate


Is this a current diagnosis for this admission?: Yes   


Plan: 


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








- Plan Summary


Summary: 


Echocardiogram done in October 2019 reveals an ejection fraction of 60% with 

right ventricular dilatation and severe pulmonary hypertension and right 

ventricular systolic function that is mild to moderately reduced





CK-MB noted to be elevated.  Significance is unclear.  Troponin is within normal

as well as total creatinine kinase.  Patient also has no chest pain.


Patient also is still wheezing and still short of breath.  She is unable to go 

home at this point.  I have placed on Solu-Medrol IV.  She had been on 

prednisone at home which she finished a few days ago and appears she had a 

relapse once that was discontinued

## 2020-02-22 NOTE — PDOC H&P
History of Present Illness


Admission Date/PCP: 


  20 23:42





  





Patient complains of: Dyspnea


History of Present Illness: 


CICI KIRKPATRICK is a 54 year old female who presented the emergency room via 

EMS with acute dyspnea.  Patient admits that she developed sudden onset of 

severe dyspnea short time before calling EMS.  She admits that her dyspnea is 

worsened with exertion, accompanied by severe air hunger and associated with 

orthopnea.  She denies other associated or accompanying signs and symptoms.  She

admits numerous prior similar episodes related to her CHF and COPD.  She has not

identified any additional aggravating or ameliorating factors for her dyspnea.  

EMS found the patient to be severely dyspneic and severely hypertensive and that

she was treated with BiPAP and IV nitroglycerin in the field.  Her symptoms 

resolved rapidly over the course of her transportation and she was significantly

improved by the time she arrived at the emergency room.  In the ER she was 

weaned off BiPAP back to 3-1/2 L/min of oxygen via nasal cannula.  The patient's

chest x-ray showed no acute process and her laboratory evaluation was 

unremarkable.  She was subsequently placed in observation for further evaluation

and treatment.





Past Medical History


Cardiac Medical History: Reports: Congestive Heart Failure, Hypertension


   Denies: Coronary Artery Disease, Myocardial Infarction


Pulmonary Medical History: Reports: Asthma, Bronchitis, Chronic Obstructive 

Pulmonary Disease (COPD), Pneumonia, Respiratory Failure - Chronic respiratory 

failure with hypoxia, Other - Chronic cough


EENT Medical History: 


   Denies: Cataracts, Ears - Hearing aids


Neurological Medical History: Reports: Seizures - Single seizure in the remote 

past


   Denies: Hemorrhagic CVA, Ischemic CVA


Endocrine Medical History: 


   Denies: Diabetes Mellitus Type 1, Diabetes Mellitus Type 2, Hyperthyroidism, 

Hypothyroidism, Obesity


Renal/ Medical History: 


   Denies: Chronic Kidney Disease, Nephrolithiasis


Malignancy Medical History: Reports: None


GI Medical History: 


   Denies: Cirrhosis, Crohn's Disease, Gastroesophageal Reflux Disease, 

Hepatitis, Peptic Ulcer Disease, Ulcerative Colitis


Musculoskeltal Medical History: Reports: Arthritis


   Denies: Gout


Skin Medical History: 


   Denies: Eczema, Psoriasis


Psychiatric Medical History: Reports: Bipolar Disorder, Depression, Tobacco 

Dependency


   Denies: Alcohol Dependency, Substance Abuse


Traumatic Medical History: Reports: None


Hematology: 


   Denies: Anemia, Bleeding Tendencies


Infectious Medical History: Reports: None





Past Surgical History


Past Surgical History: Reports:  Section, Orthopedic Surgery - right 

knee





Social History


Information Source: Patient


Lives with: Family


Smoking Status: Current Every Day Smoker


Electronic Cigarette use?: No


Frequency of Alcohol Use: None


Hx Recreational Drug Use: Yes


Drugs: Marijuana


Hx Prescription Drug Abuse: No





- Advance Directive


Resuscitation Status: Full Code


Surrogate healthcare decision maker:: 


Ed Kirkpatrick





Family History


Family History: COPD, Hypertension.  denies: CAD, CVA, DM, Malignancy


Parental Family History Reviewed: Yes


Children Family History Reviewed: No


Sibling(s) Family History Reviewed.: Yes





Medication/Allergy


Home Medications: 








Albuterol Sulfate [Proair HFA Inhalation Aerosol 8.5 gm MDI] 1 puff IH Q4HP PRN 

10/01/19 


Aripiprazole [Abilify 5 mg Tablet] 5 mg PO DAILY 10/01/19 


Bupropion HCl [Wellbutrin Xl 300mg 24hr Tablet] 300 mg PO DAILY 10/01/19 


Citalopram Hydrobromide [Celexa 40 mg Tablet] 40 mg PO DAILY 10/01/19 


Fluticasone/Umeclidin/Vilanter [Trelegy 100-62.5-25 Mcg Ellipta 14 Dose/Dpi] 2 

puff IH BID 10/01/19 


Lisinopril [Prinivil 40 mg Tablet] 20 mg PO DAILY 10/01/19 


Montelukast Sodium [Singulair 10 mg Tablet] 10 mg PO QHS 10/01/19 


Risperidone [Risperdal] 3 mg PO Q12 10/01/19 


Albuterol Sulfate [Ventolin 0.083% Neb 2.5 mg/3 mL Ampul] 1 vial NEB RTQ6 PRN 

#100 10/03/19 


Azithromycin [Zithromax 250 mg Tablet] 250 mg PO DAILY #5 tablet 10/03/19 


Cefuroxime Axetil [Ceftin 500 mg Tablet] 1 tab PO BID 7 Days #14 tablet 10/03/19




Fluticasone Propionate [Flonase Nasal Spray 50 Mcg/Spray 16 gm] 2 spray NASL Q12

#1 bottle 10/03/19 


Metoprolol Succinate [Toprol Xl 25 mg Tab.sr] 25 mg PO DAILY 14 Days #14 tab.s

r.24h 10/03/19 


Nicotine [Nicoderm 21 mg/24 Hr Transderm Patch] 1 each TD DAILY  patch.td24 

10/03/19 


Prednisone [Deltasone 20 mg Tablet] 20 mg PO BID 5 Days #10 tablet 10/03/19 


Psyllium Seed [Metamucil-Sf Powder 5.85 gm Packet] 1 packet PO DAILY  packet 

10/03/19 








Allergies/Adverse Reactions: 


                                        





No Known Allergies Allergy (Verified 14 03:01)


   











Review of Systems


Constitutional: ABSENT: chills, fever(s)


Eyes: ABSENT: visual disturbances, other - Eye pain


Ears: ABSENT: hearing changes, other - Ear pain


Nose, Mouth, and Throat: ABSENT: headache(s), mouth pain, sore throat


Cardiovascular: PRESENT: as per HPI, dyspnea on exertion, orthropnea.  ABSENT: 

chest pain, edema, palpitations


Respiratory: PRESENT: as per HPI, cough - Chronic, dyspnea, sputum - Chronic 

cough with small amounts of whitish sputum every day.  ABSENT: hemoptysis


Gastrointestinal: ABSENT: abdominal pain, constipation, diarrhea, nausea, 

vomiting


Genitourinary: ABSENT: dysuria, hematuria


Musculoskeletal: ABSENT: back pain, joint swelling


Integumentary: ABSENT: diaphoresis, pruritus, rash


Neurological: ABSENT: confusion, convulsions, focal weakness, memory loss, 

syncope


Psychiatric: ABSENT: anxiety, depression


Endocrine: ABSENT: cold intolerance, heat intolerance, polydipsia, polyphagia, 

polyuria


Hematologic/Lymphatic: ABSENT: easy bleeding, easy bruising


Allergic/Immunologic: ABSENT: seasonal rhinorrhea





Physical Exam


Vital Signs: 


                                        











Temp Pulse Resp BP Pulse Ox


 


       16   142/72 H  96 


 


       20 23:36  20 23:36  20 23:27








                                 Intake & Output











 20





 23:59 23:59 23:59


 


Weight   57.606 kg











General appearance: PRESENT: no acute distress, cooperative


Head exam: PRESENT: atraumatic, normocephalic


Eye exam: PRESENT: conjunctiva pink.  ABSENT: conjunctival injection, scleral 

icterus


Ear exam: PRESENT: normal external ear exam.  ABSENT: bleeding, drainage


Mouth exam: PRESENT: dry mucosa, neck supple


Neck exam: ABSENT: thyromegaly, tracheal deviation


Respiratory exam: PRESENT: decreased breath sounds - Moderate decreased breath 

sounds noted throughout all fields consistent with moderate to severe COPD, 

prolonged expiratory phas - Mildly prolonged expiratory phase noted throughout 

all fields, rales - Fine rales noted in both bases, symmetrical, wheezes - Mild 

expiratory wheezes noted in all fields


Cardiovascular exam: PRESENT: RRR.  ABSENT: clicks, gallop, rubs


Pulses: PRESENT: normal radial pulses, normal dorsalis pedis pul


Vascular exam: PRESENT: normal capillary refill.  ABSENT: pallor


GI/Abdominal exam: PRESENT: normal bowel sounds, soft.  ABSENT: tenderness


Rectal exam: PRESENT: deferred


Extremities exam: ABSENT: joint swelling, pedal edema


Musculoskeletal exam: ABSENT: deformity, dislocation


Neurological exam: PRESENT: alert, oriented to person, oriented to place, 

oriented to time, oriented to situation, CN II-XII grossly intact.  ABSENT: 

motor sensory deficit


Psychiatric exam: PRESENT: appropriate affect, normal mood


Skin exam: PRESENT: dry, intact, warm.  ABSENT: jaundice, rash, urticaria





Results


Laboratory Results: 


                                        





                                 20 20:30 





                                 20 20:30 





                                        











  20





  20:30 20:30 20:30


 


WBC  11.9 H  


 


RBC  4.80  


 


Hgb  12.9  


 


Hct  40.3  


 


MCV  84  


 


MCH  26.8 L  


 


MCHC  32.0  


 


RDW  16.1 H  


 


Plt Count  314  


 


Seg Neutrophils %  81.4 H  


 


Carbonic Acid   


 


HCO3/H2CO3 Ratio   


 


ABG pH   


 


ABG pCO2   


 


ABG pO2   


 


ABG HCO3   


 


ABG O2 Saturation   


 


ABG Base Excess   


 


FiO2   


 


Sodium   126.2 L 


 


Potassium   3.9 


 


Chloride   82 L 


 


Carbon Dioxide   38 H 


 


Anion Gap   6 


 


BUN   17 


 


Creatinine   0.94 


 


Est GFR ( Amer)   > 60 


 


Glucose   84 


 


Lactic Acid    0.6 L


 


Calcium   9.1 


 


Magnesium   1.8 


 


Total Bilirubin   0.3 


 


AST   34 


 


Alkaline Phosphatase   76 


 


Total Protein   5.7 L 


 


Albumin   3.5 


 


Urine Color   


 


Urine Appearance   


 


Urine pH   


 


Ur Specific Gravity   


 


Urine Protein   


 


Urine Glucose (UA)   


 


Urine Ketones   


 


Urine Blood   


 


Urine RBC (Auto)   














  20





  21:00 21:40


 


WBC  


 


RBC  


 


Hgb  


 


Hct  


 


MCV  


 


MCH  


 


MCHC  


 


RDW  


 


Plt Count  


 


Seg Neutrophils %  


 


Carbonic Acid   2.21 H


 


HCO3/H2CO3 Ratio   17:1


 


ABG pH   7.35


 


ABG pCO2   73.4 H*


 


ABG pO2   108.8 H


 


ABG HCO3   39.3 H


 


ABG O2 Saturation   97.5


 


ABG Base Excess   10.8


 


FiO2   40%


 


Sodium  


 


Potassium  


 


Chloride  


 


Carbon Dioxide  


 


Anion Gap  


 


BUN  


 


Creatinine  


 


Est GFR (African Amer)  


 


Glucose  


 


Lactic Acid  


 


Calcium  


 


Magnesium  


 


Total Bilirubin  


 


AST  


 


Alkaline Phosphatase  


 


Total Protein  


 


Albumin  


 


Urine Color  COLORLESS 


 


Urine Appearance  CLEAR 


 


Urine pH  6.0 


 


Ur Specific Gravity  1.002 


 


Urine Protein  NEGATIVE 


 


Urine Glucose (UA)  NEGATIVE 


 


Urine Ketones  NEGATIVE 


 


Urine Blood  NEGATIVE 


 


Urine RBC (Auto)  0 








                                        











  20





  20:30 20:33


 


Troponin I  0.026 


 


NT-Pro-B Natriuret Pep   605 H











Impressions: 


                                        





Chest X-Ray  20 20:48


IMPRESSION: COPD


 


No acute abnormality is identified.


 














Assessment and Plan





- Diagnosis


(1) Acute pulmonary edema with congestive heart failure


Is this a current diagnosis for this admission?: Yes   





(2) Acute on chronic congestive heart failure


Qualifiers: 


   Heart failure type: unspecified   Qualified Code(s): I50.9 - Heart failure, 

unspecified   


Is this a current diagnosis for this admission?: Yes   





(3) Acute and chronic respiratory failure with hypoxia


Is this a current diagnosis for this admission?: Yes   





(4) Hyponatremia


Is this a current diagnosis for this admission?: Yes   





(5) Chronic obstructive pulmonary disease


Qualifiers: 


   COPD type: unspecified COPD   Qualified Code(s): J44.9 - Chronic obstructive 

pulmonary disease, unspecified   


Is this a current diagnosis for this admission?: Yes   





(6) Bipolar 1 disorder


Is this a current diagnosis for this admission?: Yes   





(7) Hypertension


Qualifiers: 


   Hypertension type: essential hypertension   Qualified Code(s): I10 - 

Essential (primary) hypertension   


Is this a current diagnosis for this admission?: Yes   





(8) Tobacco use disorder, continuous


Is this a current diagnosis for this admission?: Yes   





- Plan Summary


Summary: 


Patient is admitted to observation status on the medical floor where she 

received routine supportive and symptomatic cares.  Serial cardiac enzymes will 

be obtained and the patient will be maintained on oxygen support as needed to 

maintain an adequate O2 saturation.  Her usual medications will be continued as 

soon as her medication list can be verified and reconciled.  Nitroglycerin 2% 

topical will be continued 1 g every 6 hours x2 doses.  She will use morphine 

sulfate 2 mg IV every hour as needed severe dyspnea.  She will use Ativan 1 mg 

IV every 4 hours as needed anxiety.





- Time


Time Spent with patient: 25-34 minutes


Medications reviewed and adjusted accordingly: Yes


Anticipated discharge: Home





- Inpatient Certification


Based on my medical assessment, after consideration of the patient's 

comorbidities, presenting symptoms, or acuity I expect that the services needed 

warrant INPATIENT care.: No


I certify that my determination is in accordance with my understanding of 

Medicare's requirements for reasonable and necessary INPATIENT services [42 CFR 

412.3e].: No

## 2020-02-23 LAB
ADD MANUAL DIFF: NO
ANION GAP SERPL CALC-SCNC: 2 MMOL/L (ref 5–19)
ANION GAP SERPL CALC-SCNC: 6 MMOL/L (ref 5–19)
BASOPHILS # BLD AUTO: 0 10^3/UL (ref 0–0.2)
BASOPHILS NFR BLD AUTO: 0.2 % (ref 0–2)
BUN SERPL-MCNC: 17 MG/DL (ref 7–20)
BUN SERPL-MCNC: 18 MG/DL (ref 7–20)
CALCIUM: 8.9 MG/DL (ref 8.4–10.2)
CALCIUM: 8.9 MG/DL (ref 8.4–10.2)
CHLORIDE SERPL-SCNC: 82 MMOL/L (ref 98–107)
CHLORIDE SERPL-SCNC: 86 MMOL/L (ref 98–107)
CO2 SERPL-SCNC: 41 MMOL/L (ref 22–30)
CO2 SERPL-SCNC: 44 MMOL/L (ref 22–30)
EOSINOPHIL # BLD AUTO: 0 10^3/UL (ref 0–0.6)
EOSINOPHIL NFR BLD AUTO: 0 % (ref 0–6)
ERYTHROCYTE [DISTWIDTH] IN BLOOD BY AUTOMATED COUNT: 15.6 % (ref 11.5–14)
GLUCOSE SERPL-MCNC: 236 MG/DL (ref 75–110)
GLUCOSE SERPL-MCNC: 91 MG/DL (ref 75–110)
HCT VFR BLD CALC: 38.8 % (ref 36–47)
HGB BLD-MCNC: 12.7 G/DL (ref 12–15.5)
LYMPHOCYTES # BLD AUTO: 0.5 10^3/UL (ref 0.5–4.7)
LYMPHOCYTES NFR BLD AUTO: 6.5 % (ref 13–45)
MCH RBC QN AUTO: 27.3 PG (ref 27–33.4)
MCHC RBC AUTO-ENTMCNC: 32.7 G/DL (ref 32–36)
MCV RBC AUTO: 84 FL (ref 80–97)
MONOCYTES # BLD AUTO: 0.5 10^3/UL (ref 0.1–1.4)
MONOCYTES NFR BLD AUTO: 7.7 % (ref 3–13)
NEUTROPHILS # BLD AUTO: 6.1 10^3/UL (ref 1.7–8.2)
NEUTS SEG NFR BLD AUTO: 85.6 % (ref 42–78)
PLATELET # BLD: 275 10^3/UL (ref 150–450)
POTASSIUM SERPL-SCNC: 3.9 MMOL/L (ref 3.6–5)
POTASSIUM SERPL-SCNC: 4.7 MMOL/L (ref 3.6–5)
RBC # BLD AUTO: 4.64 10^6/UL (ref 3.72–5.28)
TOTAL CELLS COUNTED % (AUTO): 100 %
WBC # BLD AUTO: 7.1 10^3/UL (ref 4–10.5)

## 2020-02-23 RX ADMIN — IPRATROPIUM BROMIDE SCH MG: 0.5 SOLUTION RESPIRATORY (INHALATION) at 23:32

## 2020-02-23 RX ADMIN — LEVALBUTEROL HYDROCHLORIDE SCH MG: 1.25 SOLUTION RESPIRATORY (INHALATION) at 23:32

## 2020-02-23 RX ADMIN — LEVALBUTEROL HYDROCHLORIDE SCH MG: 1.25 SOLUTION RESPIRATORY (INHALATION) at 16:11

## 2020-02-23 RX ADMIN — METHYLPREDNISOLONE SODIUM SUCCINATE SCH MG: 40 INJECTION, POWDER, FOR SOLUTION INTRAMUSCULAR; INTRAVENOUS at 05:49

## 2020-02-23 RX ADMIN — METHYLPREDNISOLONE SODIUM SUCCINATE SCH MG: 40 INJECTION, POWDER, FOR SOLUTION INTRAMUSCULAR; INTRAVENOUS at 13:34

## 2020-02-23 RX ADMIN — DOCUSATE SODIUM SCH MG: 100 CAPSULE, LIQUID FILLED ORAL at 10:20

## 2020-02-23 RX ADMIN — ARIPIPRAZOLE SCH MG: 5 TABLET ORAL at 10:24

## 2020-02-23 RX ADMIN — CITALOPRAM HYDROBROMIDE SCH MG: 20 TABLET ORAL at 10:19

## 2020-02-23 RX ADMIN — BUDESONIDE SCH MG: 0.5 SUSPENSION RESPIRATORY (INHALATION) at 08:39

## 2020-02-23 RX ADMIN — HEPARIN SODIUM SCH: 5000 INJECTION, SOLUTION INTRAVENOUS; SUBCUTANEOUS at 05:50

## 2020-02-23 RX ADMIN — HEPARIN SODIUM SCH UNIT: 5000 INJECTION, SOLUTION INTRAVENOUS; SUBCUTANEOUS at 21:15

## 2020-02-23 RX ADMIN — IPRATROPIUM BROMIDE SCH MG: 0.5 SOLUTION RESPIRATORY (INHALATION) at 08:39

## 2020-02-23 RX ADMIN — SPIRONOLACTONE SCH MG: 25 TABLET, FILM COATED ORAL at 10:19

## 2020-02-23 RX ADMIN — RISPERIDONE SCH MG: 1 TABLET ORAL at 10:18

## 2020-02-23 RX ADMIN — PANTOPRAZOLE SODIUM SCH MG: 40 TABLET, DELAYED RELEASE ORAL at 05:49

## 2020-02-23 RX ADMIN — MORPHINE SULFATE PRN MG: 10 INJECTION INTRAMUSCULAR; INTRAVENOUS; SUBCUTANEOUS at 13:47

## 2020-02-23 RX ADMIN — HEPARIN SODIUM SCH UNIT: 5000 INJECTION, SOLUTION INTRAVENOUS; SUBCUTANEOUS at 13:35

## 2020-02-23 RX ADMIN — RISPERIDONE SCH MG: 1 TABLET ORAL at 21:16

## 2020-02-23 RX ADMIN — BENZTROPINE MESYLATE SCH MG: 1 TABLET ORAL at 17:38

## 2020-02-23 RX ADMIN — FUROSEMIDE SCH MG: 20 TABLET ORAL at 10:19

## 2020-02-23 RX ADMIN — IPRATROPIUM BROMIDE SCH MG: 0.5 SOLUTION RESPIRATORY (INHALATION) at 16:11

## 2020-02-23 RX ADMIN — Medication SCH: at 21:16

## 2020-02-23 RX ADMIN — Medication SCH ML: at 05:49

## 2020-02-23 RX ADMIN — MORPHINE SULFATE PRN MG: 10 INJECTION INTRAMUSCULAR; INTRAVENOUS; SUBCUTANEOUS at 07:07

## 2020-02-23 RX ADMIN — LEVALBUTEROL HYDROCHLORIDE PRN MG: 0.63 SOLUTION RESPIRATORY (INHALATION) at 20:11

## 2020-02-23 RX ADMIN — BUPROPION HYDROCHLORIDE SCH MG: 75 TABLET, FILM COATED ORAL at 10:18

## 2020-02-23 RX ADMIN — BUDESONIDE SCH MG: 0.5 SUSPENSION RESPIRATORY (INHALATION) at 20:11

## 2020-02-23 RX ADMIN — Medication SCH ML: at 13:35

## 2020-02-23 RX ADMIN — BUPROPION HYDROCHLORIDE SCH MG: 75 TABLET, FILM COATED ORAL at 21:16

## 2020-02-23 RX ADMIN — BENZTROPINE MESYLATE SCH MG: 1 TABLET ORAL at 10:18

## 2020-02-23 RX ADMIN — METHYLPREDNISOLONE SODIUM SUCCINATE SCH MG: 40 INJECTION, POWDER, FOR SOLUTION INTRAMUSCULAR; INTRAVENOUS at 21:15

## 2020-02-23 RX ADMIN — LEVALBUTEROL HYDROCHLORIDE SCH MG: 1.25 SOLUTION RESPIRATORY (INHALATION) at 08:39

## 2020-02-23 RX ADMIN — FLUTICASONE PROPIONATE SCH SPR: 50 SPRAY, METERED NASAL at 21:15

## 2020-02-23 RX ADMIN — NEBIVOLOL HYDROCHLORIDE SCH MG: 10 TABLET ORAL at 17:38

## 2020-02-23 RX ADMIN — MORPHINE SULFATE PRN MG: 10 INJECTION INTRAMUSCULAR; INTRAVENOUS; SUBCUTANEOUS at 19:36

## 2020-02-23 NOTE — PDOC PROGRESS REPORT
Subjective


Progress Note for:: 02/23/20


Subjective:: 





Breathing mildly improved


Still wheezing but patient feels that she is better today.  She denies any cough

or chest pain


Reason For Visit: 


ACUTE CONGESTIVE HEART FAILURE








Physical Exam


Vital Signs: 


                                        











Temp Pulse Resp BP Pulse Ox


 


 97.8 F   112 H  16   139/68 H  98 


 


 02/23/20 10:50  02/23/20 14:14  02/23/20 10:50  02/23/20 14:14  02/23/20 10:50








                                 Intake & Output











 02/22/20 02/23/20 02/24/20





 06:59 06:59 06:59


 


Intake Total 360 2068 460


 


Output Total 700 1100 500


 


Balance -340 968 -40


 


Weight 57.8 kg 57.1 kg 











General appearance: PRESENT: thin, other - Chronically ill looking


Neck exam: PRESENT: full ROM.  ABSENT: JVD, tenderness


Respiratory exam: PRESENT: rhonchi - Expiratory, unlabored, wheezes.  ABSENT: 

accessory muscle use


Cardiovascular exam: PRESENT: +S1, +S2, tachycardia


GI/Abdominal exam: PRESENT: soft.  ABSENT: tenderness


Rectal exam: PRESENT: deferred


Neurological exam: PRESENT: alert, awake, oriented to person, oriented to place,

oriented to time, oriented to situation





Results


Laboratory Results: 


                                        





                                 02/23/20 05:01 





                                 02/23/20 05:01 





                                        











  02/23/20 02/23/20





  05:01 05:01


 


WBC  7.1 


 


RBC  4.64 


 


Hgb  12.7 


 


Hct  38.8 


 


MCV  84 


 


MCH  27.3 


 


MCHC  32.7 


 


RDW  15.6 H 


 


Plt Count  275 


 


Seg Neutrophils %  85.6 H 


 


Sodium   131.8 L


 


Potassium   4.7


 


Chloride   86 L


 


Carbon Dioxide   44 H*


 


Anion Gap   2 L


 


BUN   17


 


Creatinine   0.85


 


Est GFR (African Amer)   > 60


 


Glucose   91


 


Calcium   8.9








                                        











  02/21/20 02/21/20 02/21/20





  20:30 20:30 20:33


 


Creatine Kinase   136 H 


 


CK-MB (CK-2)   


 


Troponin I  0.026  


 


NT-Pro-B Natriuret Pep    605 H














  02/22/20 02/22/20 02/22/20





  00:30 00:56 05:16


 


Creatine Kinase   89  58


 


CK-MB (CK-2)  10.40 H  


 


Troponin I  0.028  


 


NT-Pro-B Natriuret Pep   














  02/22/20 02/22/20





  05:16 12:32


 


Creatine Kinase  


 


CK-MB (CK-2)  8.41 H  8.72 H


 


Troponin I  0.021  0.019


 


NT-Pro-B Natriuret Pep  











Impressions: 


                                        





Chest X-Ray  02/21/20 20:48


IMPRESSION: COPD


 


No acute abnormality is identified.


 














Assessment and Plan





- Diagnosis


(1) Acute and chronic respiratory failure (acute-on-chronic)


Qualifiers: 


   Respiratory failure complication: hypercapnia   Qualified Code(s): J96.22 - 

Acute and chronic respiratory failure with hypercapnia   


Is this a current diagnosis for this admission?: Yes   


Plan: 


Continue with oxygen support.  Patient may benefit from CPAP.  Will reevaluate 

and decide if follow-up ABG is needed in a.m.








(2) Chronic obstructive pulmonary disease


Qualifiers: 


   COPD type: unspecified COPD   Qualified Code(s): J44.9 - Chronic obstructive 

pulmonary disease, unspecified   


Is this a current diagnosis for this admission?: Yes   


Plan: 


Continue with steroids and taper as per response








(3) Tobacco use disorder, continuous


Is this a current diagnosis for this admission?: Yes   





(4) Disturbance of acid-base balance


Is this a current diagnosis for this admission?: Yes   


Plan: 


With compensated acidosis with a near normal pH however is also noted that 

patient is hypochloremic and hyponatremic although her sodium is improved.  She 

was started on Aldactone and so I have decided to hold this for now as patient 

may be developing a contraction alkalosis.  We will follow-up with BMP and BNP 

in a.m.








(5) Hyponatremia


Is this a current diagnosis for this admission?: Yes   


Plan: 


Initial sodium was 126, it is 133 today.  Hyponatremia could be secondary to 

steroid as well as diuretic use.  We will follow-up and recheck in a.m.








(6) Protein-calorie malnutrition, moderate


Is this a current diagnosis for this admission?: Yes   


Plan: 


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








- Plan Summary


Summary: 


Echocardiogram done in October 2019 reveals an ejection fraction of 60% with 

right ventricular dilatation and severe pulmonary hypertension and right 

ventricular systolic function that is mild to moderately reduced





CK-MB noted to be elevated.  Significance is unclear.  Troponin is within normal

as well as total creatinine kinase.  Patient also has no chest pain.


Patient also is still wheezing and still short of breath.  She is unable to go 

home at this point.  I have placed on Solu-Medrol IV.  She had been on predniso

ne at home which she finished a few days ago and appears she had a relapse once 

that was discontinued





2/23 patient still wheezing although he seems to be improved.  Her blood 

pressure was also noted to be elevated and patient was tachycardic.  I have 

added bystolic to her regimen

## 2020-02-24 LAB
ADD MANUAL DIFF: NO
ANION GAP SERPL CALC-SCNC: 3 MMOL/L (ref 5–19)
BASE EXCESS BLDV CALC-SCNC: 12.4 MMOL/L
BASE EXCESS BLDV CALC-SCNC: 15.2 MMOL/L
BASOPHILS # BLD AUTO: 0 10^3/UL (ref 0–0.2)
BASOPHILS NFR BLD AUTO: 0.3 % (ref 0–2)
BUN SERPL-MCNC: 21 MG/DL (ref 7–20)
CALCIUM: 9.1 MG/DL (ref 8.4–10.2)
CHLORIDE SERPL-SCNC: 85 MMOL/L (ref 98–107)
CO2 SERPL-SCNC: 43 MMOL/L (ref 22–30)
EOSINOPHIL # BLD AUTO: 0 10^3/UL (ref 0–0.6)
EOSINOPHIL NFR BLD AUTO: 0 % (ref 0–6)
ERYTHROCYTE [DISTWIDTH] IN BLOOD BY AUTOMATED COUNT: 15.7 % (ref 11.5–14)
GLUCOSE SERPL-MCNC: 88 MG/DL (ref 75–110)
HCO3 BLDV-SCNC: 42.1 MMOL/L (ref 20–32)
HCO3 BLDV-SCNC: 45.8 MMOL/L (ref 20–32)
HCT VFR BLD CALC: 39 % (ref 36–47)
HGB BLD-MCNC: 12.8 G/DL (ref 12–15.5)
LYMPHOCYTES # BLD AUTO: 0.5 10^3/UL (ref 0.5–4.7)
LYMPHOCYTES NFR BLD AUTO: 6 % (ref 13–45)
MCH RBC QN AUTO: 27.3 PG (ref 27–33.4)
MCHC RBC AUTO-ENTMCNC: 32.8 G/DL (ref 32–36)
MCV RBC AUTO: 83 FL (ref 80–97)
MONOCYTES # BLD AUTO: 0.6 10^3/UL (ref 0.1–1.4)
MONOCYTES NFR BLD AUTO: 6.6 % (ref 3–13)
NEUTROPHILS # BLD AUTO: 7.4 10^3/UL (ref 1.7–8.2)
NEUTS SEG NFR BLD AUTO: 87.1 % (ref 42–78)
PCO2 BLDV: 82 MMHG (ref 35–63)
PCO2 BLDV: 88.7 MMHG (ref 35–63)
PH BLDV: 7.33 [PH] (ref 7.3–7.42)
PH BLDV: 7.33 [PH] (ref 7.3–7.42)
PLATELET # BLD: 292 10^3/UL (ref 150–450)
POTASSIUM SERPL-SCNC: 4.4 MMOL/L (ref 3.6–5)
RBC # BLD AUTO: 4.68 10^6/UL (ref 3.72–5.28)
TOTAL CELLS COUNTED % (AUTO): 100 %
WBC # BLD AUTO: 8.5 10^3/UL (ref 4–10.5)

## 2020-02-24 RX ADMIN — RISPERIDONE SCH MG: 1 TABLET ORAL at 09:56

## 2020-02-24 RX ADMIN — CITALOPRAM HYDROBROMIDE SCH MG: 20 TABLET ORAL at 09:56

## 2020-02-24 RX ADMIN — METHYLPREDNISOLONE SODIUM SUCCINATE SCH MG: 40 INJECTION, POWDER, FOR SOLUTION INTRAMUSCULAR; INTRAVENOUS at 05:27

## 2020-02-24 RX ADMIN — BENZTROPINE MESYLATE SCH MG: 1 TABLET ORAL at 09:55

## 2020-02-24 RX ADMIN — NEBIVOLOL HYDROCHLORIDE SCH MG: 10 TABLET ORAL at 09:56

## 2020-02-24 RX ADMIN — IPRATROPIUM BROMIDE SCH MG: 0.5 SOLUTION RESPIRATORY (INHALATION) at 16:05

## 2020-02-24 RX ADMIN — FLUTICASONE PROPIONATE SCH SPR: 50 SPRAY, METERED NASAL at 09:54

## 2020-02-24 RX ADMIN — HEPARIN SODIUM SCH: 5000 INJECTION, SOLUTION INTRAVENOUS; SUBCUTANEOUS at 14:25

## 2020-02-24 RX ADMIN — MORPHINE SULFATE PRN MG: 10 INJECTION INTRAMUSCULAR; INTRAVENOUS; SUBCUTANEOUS at 05:30

## 2020-02-24 RX ADMIN — FUROSEMIDE SCH MG: 20 TABLET ORAL at 09:55

## 2020-02-24 RX ADMIN — LEVALBUTEROL HYDROCHLORIDE SCH: 1.25 SOLUTION RESPIRATORY (INHALATION) at 23:50

## 2020-02-24 RX ADMIN — PANTOPRAZOLE SODIUM SCH MG: 40 TABLET, DELAYED RELEASE ORAL at 05:27

## 2020-02-24 RX ADMIN — HEPARIN SODIUM SCH UNIT: 5000 INJECTION, SOLUTION INTRAVENOUS; SUBCUTANEOUS at 21:31

## 2020-02-24 RX ADMIN — LEVALBUTEROL HYDROCHLORIDE SCH MG: 1.25 SOLUTION RESPIRATORY (INHALATION) at 08:45

## 2020-02-24 RX ADMIN — BUDESONIDE SCH MG: 0.5 SUSPENSION RESPIRATORY (INHALATION) at 20:30

## 2020-02-24 RX ADMIN — Medication SCH: at 05:27

## 2020-02-24 RX ADMIN — FLUTICASONE PROPIONATE SCH SPR: 50 SPRAY, METERED NASAL at 21:31

## 2020-02-24 RX ADMIN — IPRATROPIUM BROMIDE SCH: 0.5 SOLUTION RESPIRATORY (INHALATION) at 23:50

## 2020-02-24 RX ADMIN — BUDESONIDE SCH MG: 0.5 SUSPENSION RESPIRATORY (INHALATION) at 08:45

## 2020-02-24 RX ADMIN — BUPROPION HYDROCHLORIDE SCH MG: 75 TABLET, FILM COATED ORAL at 21:31

## 2020-02-24 RX ADMIN — BENZTROPINE MESYLATE SCH MG: 1 TABLET ORAL at 17:22

## 2020-02-24 RX ADMIN — Medication SCH: at 21:32

## 2020-02-24 RX ADMIN — BUPROPION HYDROCHLORIDE SCH MG: 75 TABLET, FILM COATED ORAL at 09:54

## 2020-02-24 RX ADMIN — NICOTINE PRN EACH: 21 PATCH, EXTENDED RELEASE TOPICAL at 05:40

## 2020-02-24 RX ADMIN — MORPHINE SULFATE PRN MG: 10 INJECTION INTRAMUSCULAR; INTRAVENOUS; SUBCUTANEOUS at 20:23

## 2020-02-24 RX ADMIN — RISPERIDONE SCH MG: 1 TABLET ORAL at 21:31

## 2020-02-24 RX ADMIN — DOCUSATE SODIUM SCH MG: 100 CAPSULE, LIQUID FILLED ORAL at 09:55

## 2020-02-24 RX ADMIN — LEVALBUTEROL HYDROCHLORIDE PRN MG: 0.63 SOLUTION RESPIRATORY (INHALATION) at 20:30

## 2020-02-24 RX ADMIN — IPRATROPIUM BROMIDE SCH MG: 0.5 SOLUTION RESPIRATORY (INHALATION) at 08:45

## 2020-02-24 RX ADMIN — Medication SCH: at 14:24

## 2020-02-24 RX ADMIN — HEPARIN SODIUM SCH UNIT: 5000 INJECTION, SOLUTION INTRAVENOUS; SUBCUTANEOUS at 05:27

## 2020-02-24 RX ADMIN — METHYLPREDNISOLONE SODIUM SUCCINATE SCH MG: 40 INJECTION, POWDER, FOR SOLUTION INTRAMUSCULAR; INTRAVENOUS at 14:29

## 2020-02-24 RX ADMIN — LEVALBUTEROL HYDROCHLORIDE SCH MG: 1.25 SOLUTION RESPIRATORY (INHALATION) at 16:05

## 2020-02-24 RX ADMIN — METHYLPREDNISOLONE SODIUM SUCCINATE SCH MG: 40 INJECTION, POWDER, FOR SOLUTION INTRAMUSCULAR; INTRAVENOUS at 21:31

## 2020-02-24 RX ADMIN — ARIPIPRAZOLE SCH MG: 5 TABLET ORAL at 09:54

## 2020-02-24 NOTE — PDOC PROGRESS REPORT
Subjective


Progress Note for:: 02/24/20


Subjective:: 





Patient still feels like she is wheezing.  I discussed with patient about not 

wearing her BiPAP and she is now agreeable to trying the BiPAP.  Patient states 

that she uses 3 L of nasal cannula at home which she wears all the time.  

Patient also has a BiPAP machine at home which he is meant to use every night 

but only uses it on occasion.  I have counseled patient about the importance of 

using BiPAP machine every night.


Reason For Visit: 


ACUTE CONGESTIVE HEART FAILURE








Physical Exam


Vital Signs: 


                                        











Temp Pulse Resp BP Pulse Ox


 


 97.9 F   77   17   123/74   98 


 


 02/24/20 11:40  02/24/20 11:40  02/24/20 11:40  02/24/20 11:40  02/24/20 11:40








                                 Intake & Output











 02/23/20 02/24/20 02/25/20





 06:59 06:59 06:59


 


Intake Total 2068 2540 1200


 


Output Total 1100 4150 1200


 


Balance 968 -1610 0


 


Weight 57.1 kg 57.2 kg 











General appearance: PRESENT: no acute distress, cooperative


Neck exam: ABSENT: JVD


Respiratory exam: PRESENT: symmetrical, unlabored, wheezes.  ABSENT: tachypnea


Cardiovascular exam: PRESENT: RRR, +S1, +S2.  ABSENT: tachycardia


GI/Abdominal exam: PRESENT: soft.  ABSENT: distended, rebound, rigid, tenderness


Neurological exam: PRESENT: alert, awake, oriented to person, oriented to place,

oriented to time, oriented to situation





Results


Laboratory Results: 


                                        





                                 02/24/20 05:41 





                                 02/24/20 05:41 





                                        











  02/23/20 02/23/20 02/24/20





  05:01 17:45 05:41


 


WBC   


 


RBC   


 


Hgb   


 


Hct   


 


MCV   


 


MCH   


 


MCHC   


 


RDW   


 


Plt Count   


 


Seg Neutrophils %   


 


VBG pH   


 


VBG pCO2   


 


VBG HCO3   


 


VBG Base Excess   


 


Sodium  131.8 L  129.2 L  130.7 L


 


Potassium  4.7  3.9  4.4


 


Chloride  86 L  82 L  85 L


 


Carbon Dioxide  44 H*  41 H*  43 H*


 


Anion Gap  2 L  6  3 L


 


BUN  17  18  21 H


 


Creatinine  0.85  1.01  0.94


 


Est GFR ( Amer)  > 60  > 60  > 60


 


Glucose  91  236 H  88


 


Calcium  8.9  8.9  9.1














  02/24/20 02/24/20





  05:41 09:41


 


WBC  8.5 


 


RBC  4.68 


 


Hgb  12.8 


 


Hct  39.0 


 


MCV  83 


 


MCH  27.3 


 


MCHC  32.8 


 


RDW  15.7 H 


 


Plt Count  292 


 


Seg Neutrophils %  87.1 H 


 


VBG pH   7.33


 


VBG pCO2   82.0 H*


 


VBG HCO3   42.1 H


 


VBG Base Excess   12.4


 


Sodium  


 


Potassium  


 


Chloride  


 


Carbon Dioxide  


 


Anion Gap  


 


BUN  


 


Creatinine  


 


Est GFR (African Amer)  


 


Glucose  


 


Calcium  








                                        











  02/21/20 02/21/20 02/21/20





  20:30 20:30 20:33


 


Creatine Kinase   136 H 


 


CK-MB (CK-2)   


 


Troponin I  0.026  


 


NT-Pro-B Natriuret Pep    605 H














  02/22/20 02/22/20 02/22/20





  00:30 00:56 05:16


 


Creatine Kinase   89  58


 


CK-MB (CK-2)  10.40 H  


 


Troponin I  0.028  


 


NT-Pro-B Natriuret Pep   














  02/22/20 02/22/20 02/24/20





  05:16 12:32 05:41


 


Creatine Kinase   


 


CK-MB (CK-2)  8.41 H  8.72 H 


 


Troponin I  0.021  0.019 


 


NT-Pro-B Natriuret Pep    401 H











Impressions: 


                                        





Chest X-Ray  02/21/20 20:48


IMPRESSION: COPD


 


No acute abnormality is identified.


 














Assessment and Plan





- Diagnosis


(1) Acute and chronic respiratory failure (acute-on-chronic)


Qualifiers: 


   Respiratory failure complication: hypoxia and hypercapnia   Qualified 

Code(s): J96.21 - Acute and chronic respiratory failure with hypoxia; J96.22 - 

Acute and chronic respiratory failure with hypercapnia   


Is this a current diagnosis for this admission?: Yes   


Plan: 


Patient uses 3 L nasal cannula at home on nocturnal BiPAP at home as well.  All 

secondary to COPD.


Venous blood gas today showing mild respiratory acidosis with PCO2 in the 80s 

but with metabolic compensation pH of 7.33.


Instruction given earlier today to place patient on BiPAP for about 2 hours.


Patient is to be continued on 2 L nasal cannula as well as nocturnal BiPAP while

inpatient.








(2) COPD exacerbation


Is this a current diagnosis for this admission?: Yes   


Plan: 


Patient seems to have advanced COPD on 3 L home oxygen on nocturnal BiPAP as 

well as prophylactic therapy with azithromycin 3 times a week.


Echocardiogram done in October 2019 reveals an ejection fraction of 60% with 

right ventricular dilatation and severe pulmonary hypertension and right 

ventricular systolic function that is mild to moderately reduced


Still notably has very significant bilateral wheezing on exam.  Continue with 

nebulizers and steroids IV.  Maintain on budesonide inhaler as well.


She will likely benefit from both LABA/ICS/L AMA upon discharge.  Continue 

azithromycin.








(3) Hyponatremia


Is this a current diagnosis for this admission?: Yes   


Plan: 


Initial sodium was 126, it is 130 today.  There are multiple potential causes of

patient's hyponatremia including patient's SSRI, Lasix.


Check serum and urine osmolarity.  Unfortunately FENa will be of limited benefit

to giving chronic diuretic use.


Hold Celexa for now.patient already received a.m. dose today.


Check TSH.


Continue to monitor BMP








(4) Protein-calorie malnutrition, moderate


Is this a current diagnosis for this admission?: Yes   


Plan: 


Likely secondary to underlying pulmonary wasting given advanced COPD.








(5) Tobacco use disorder, continuous


Is this a current diagnosis for this admission?: Yes   


Plan: 


Nicotine patch offered








- Time


Time Spent with patient: 15-24 minutes

## 2020-02-25 LAB
ANION GAP SERPL CALC-SCNC: 3 MMOL/L (ref 5–19)
BASE EXCESS BLDV CALC-SCNC: 16.4 MMOL/L
BUN SERPL-MCNC: 29 MG/DL (ref 7–20)
CALCIUM: 8.8 MG/DL (ref 8.4–10.2)
CHLORIDE SERPL-SCNC: 87 MMOL/L (ref 98–107)
CO2 SERPL-SCNC: 41 MMOL/L (ref 22–30)
GLUCOSE SERPL-MCNC: 155 MG/DL (ref 75–110)
HCO3 BLDV-SCNC: 47.2 MMOL/L (ref 20–32)
PCO2 BLDV: 93.9 MMHG (ref 35–63)
PH BLDV: 7.32 [PH] (ref 7.3–7.42)
POTASSIUM SERPL-SCNC: 4.2 MMOL/L (ref 3.6–5)

## 2020-02-25 RX ADMIN — BUPROPION HYDROCHLORIDE SCH MG: 75 TABLET, FILM COATED ORAL at 22:01

## 2020-02-25 RX ADMIN — FUROSEMIDE SCH MG: 20 TABLET ORAL at 10:17

## 2020-02-25 RX ADMIN — Medication SCH: at 05:19

## 2020-02-25 RX ADMIN — DOCUSATE SODIUM SCH MG: 100 CAPSULE, LIQUID FILLED ORAL at 10:17

## 2020-02-25 RX ADMIN — NICOTINE PRN EACH: 21 PATCH, EXTENDED RELEASE TOPICAL at 05:26

## 2020-02-25 RX ADMIN — Medication SCH: at 14:10

## 2020-02-25 RX ADMIN — METHYLPREDNISOLONE SODIUM SUCCINATE SCH MG: 40 INJECTION, POWDER, FOR SOLUTION INTRAMUSCULAR; INTRAVENOUS at 05:19

## 2020-02-25 RX ADMIN — FLUTICASONE FUROATE AND VILANTEROL TRIFENATATE SCH INHALER: 200; 25 POWDER RESPIRATORY (INHALATION) at 22:01

## 2020-02-25 RX ADMIN — BUPROPION HYDROCHLORIDE SCH MG: 75 TABLET, FILM COATED ORAL at 10:19

## 2020-02-25 RX ADMIN — RISPERIDONE SCH MG: 1 TABLET ORAL at 22:02

## 2020-02-25 RX ADMIN — IPRATROPIUM BROMIDE SCH MG: 0.5 SOLUTION RESPIRATORY (INHALATION) at 08:45

## 2020-02-25 RX ADMIN — MORPHINE SULFATE PRN MG: 10 INJECTION INTRAMUSCULAR; INTRAVENOUS; SUBCUTANEOUS at 17:59

## 2020-02-25 RX ADMIN — HEPARIN SODIUM SCH: 5000 INJECTION, SOLUTION INTRAVENOUS; SUBCUTANEOUS at 14:10

## 2020-02-25 RX ADMIN — PANTOPRAZOLE SODIUM SCH MG: 40 TABLET, DELAYED RELEASE ORAL at 05:18

## 2020-02-25 RX ADMIN — BENZTROPINE MESYLATE SCH MG: 1 TABLET ORAL at 17:54

## 2020-02-25 RX ADMIN — METHYLPREDNISOLONE SODIUM SUCCINATE SCH MG: 40 INJECTION, POWDER, FOR SOLUTION INTRAMUSCULAR; INTRAVENOUS at 14:15

## 2020-02-25 RX ADMIN — METHYLPREDNISOLONE SODIUM SUCCINATE SCH MG: 40 INJECTION, POWDER, FOR SOLUTION INTRAMUSCULAR; INTRAVENOUS at 22:01

## 2020-02-25 RX ADMIN — BUDESONIDE SCH MG: 0.5 SUSPENSION RESPIRATORY (INHALATION) at 08:45

## 2020-02-25 RX ADMIN — Medication SCH ML: at 22:12

## 2020-02-25 RX ADMIN — MORPHINE SULFATE PRN MG: 10 INJECTION INTRAMUSCULAR; INTRAVENOUS; SUBCUTANEOUS at 10:17

## 2020-02-25 RX ADMIN — BENZTROPINE MESYLATE SCH MG: 1 TABLET ORAL at 10:17

## 2020-02-25 RX ADMIN — LEVALBUTEROL HYDROCHLORIDE SCH MG: 1.25 SOLUTION RESPIRATORY (INHALATION) at 08:45

## 2020-02-25 RX ADMIN — ARIPIPRAZOLE SCH MG: 5 TABLET ORAL at 10:20

## 2020-02-25 RX ADMIN — RISPERIDONE SCH MG: 1 TABLET ORAL at 10:19

## 2020-02-25 RX ADMIN — IPRATROPIUM BROMIDE AND ALBUTEROL SULFATE SCH ML: 2.5; .5 SOLUTION RESPIRATORY (INHALATION) at 14:37

## 2020-02-25 RX ADMIN — IPRATROPIUM BROMIDE AND ALBUTEROL SULFATE SCH ML: 2.5; .5 SOLUTION RESPIRATORY (INHALATION) at 20:16

## 2020-02-25 RX ADMIN — NEBIVOLOL HYDROCHLORIDE SCH MG: 10 TABLET ORAL at 10:20

## 2020-02-25 RX ADMIN — FLUTICASONE PROPIONATE SCH SPR: 50 SPRAY, METERED NASAL at 10:19

## 2020-02-25 RX ADMIN — FLUTICASONE PROPIONATE SCH SPR: 50 SPRAY, METERED NASAL at 22:00

## 2020-02-25 RX ADMIN — HEPARIN SODIUM SCH UNIT: 5000 INJECTION, SOLUTION INTRAVENOUS; SUBCUTANEOUS at 05:19

## 2020-02-25 RX ADMIN — HEPARIN SODIUM SCH UNIT: 5000 INJECTION, SOLUTION INTRAVENOUS; SUBCUTANEOUS at 22:00

## 2020-02-25 NOTE — PDOC PROGRESS REPORT
Subjective


Progress Note for:: 02/25/20


Subjective:: 





Patient states her breathing is better.  Patient ambulated with her Oxymizer and

did well.  She also states that she slept with the BiPAP last night.  However 

VBG this morning still show significant hypercapnia.  I discussed with patient 

that she will require to be here for another day for frequent nebs and steroids.


Reason For Visit: 


ACUTE CONGESTIVE HEART FAILURE








Physical Exam


Vital Signs: 


                                        











Temp Pulse Resp BP Pulse Ox


 


 97.8 F   95   24 H  104/64   100 


 


 02/25/20 12:22  02/25/20 12:22  02/25/20 12:22  02/25/20 12:22  02/25/20 12:22








                                 Intake & Output











 02/24/20 02/25/20 02/26/20





 06:59 06:59 06:59


 


Intake Total 2540 2355 


 


Output Total 4150 2100 


 


Balance -1610 255 


 


Weight 57.2 kg 56.6 kg 











General appearance: PRESENT: no acute distress, cooperative


Neck exam: ABSENT: JVD


Respiratory exam: PRESENT: symmetrical, unlabored, wheezes.  ABSENT: crackles, 

tachypnea


Cardiovascular exam: PRESENT: RRR, +S1, +S2.  ABSENT: tachycardia


GI/Abdominal exam: PRESENT: normal bowel sounds, soft.  ABSENT: rebound, rigid, 

tenderness


Extremities exam: ABSENT: pedal edema


Neurological exam: PRESENT: alert, awake, oriented to person, oriented to place,

oriented to time


Psychiatric exam: ABSENT: anxious





Results


Laboratory Results: 


                                        





                                 02/24/20 05:41 





                                 02/25/20 06:11 





                                        











  02/24/20 02/24/20 02/24/20





  15:13 15:36 15:36


 


VBG pH  7.33  


 


VBG pCO2  88.7 H*  


 


VBG HCO3  45.8 H  


 


VBG Base Excess  15.2  


 


Sodium   


 


Potassium   


 


Chloride   


 


Carbon Dioxide   


 


Anion Gap   


 


BUN   


 


Creatinine   


 


Est GFR (African Amer)   


 


Glucose   


 


Serum Osmolality   286 


 


Calcium   


 


TSH    1.08


 


Urine Osmolality   














  02/24/20 02/25/20 02/25/20





  20:30 06:11 06:11


 


VBG pH    7.32


 


VBG pCO2    93.9 H*


 


VBG HCO3    47.2 H


 


VBG Base Excess    16.4


 


Sodium   131.3 L 


 


Potassium   4.2 


 


Chloride   87 L 


 


Carbon Dioxide   41 H* 


 


Anion Gap   3 L 


 


BUN   29 H 


 


Creatinine   0.99 


 


Est GFR ( Amer)   > 60 


 


Glucose   155 H 


 


Serum Osmolality   


 


Calcium   8.8 


 


TSH   


 


Urine Osmolality  215 L  








                                        











  02/21/20 02/21/20 02/21/20





  20:30 20:30 20:33


 


Creatine Kinase   136 H 


 


CK-MB (CK-2)   


 


Troponin I  0.026  


 


NT-Pro-B Natriuret Pep    605 H














  02/22/20 02/22/20 02/22/20





  00:30 00:56 05:16


 


Creatine Kinase   89  58


 


CK-MB (CK-2)  10.40 H  


 


Troponin I  0.028  


 


NT-Pro-B Natriuret Pep   














  02/22/20 02/22/20 02/24/20





  05:16 12:32 05:41


 


Creatine Kinase   


 


CK-MB (CK-2)  8.41 H  8.72 H 


 


Troponin I  0.021  0.019 


 


NT-Pro-B Natriuret Pep    401 H











Impressions: 


                                        





Chest X-Ray  02/21/20 20:48


IMPRESSION: COPD


 


No acute abnormality is identified.


 














Assessment and Plan





- Diagnosis


(1) Acute and chronic respiratory failure (acute-on-chronic)


Qualifiers: 


   Respiratory failure complication: hypoxia and hypercapnia   Qualified 

Code(s): J96.21 - Acute and chronic respiratory failure with hypoxia; J96.22 - 

Acute and chronic respiratory failure with hypercapnia   


Is this a current diagnosis for this admission?: Yes   


Plan: 


Patient uses 3 L nasal cannula at home on nocturnal CPAP at home as well.  All 

secondary to COPD.


Patient continues to have significant a.m. hypercarpnia with PCO2 of 93 on VBG 

this morning even despite sleeping with BiPAP.  This is likely from her COPD.


I will adjust patient's BiPAP settings and increase IPAP to 18.  We will check 

ABG tomorrow morning.











(2) COPD exacerbation


Is this a current diagnosis for this admission?: Yes   


Plan: 


Patient seems to have advanced COPD on 3 L home oxygen on nocturnal CPAP as well

as prophylactic therapy with azithromycin 3 times a week.


Echocardiogram done in October 2019 reveals an ejection fraction of 60% with 

right ventricular dilatation and severe pulmonary hypertension and right 

ventricular systolic function that is mild to moderately reduced


Still notably has very significant bilateral wheezing on exam.  


She will likely benefit from both LABA/ICS/L AMA upon discharge.  Continue 

azithromycin.


Placed on Breo Ellipta.  Discontinue Xopenex and change to DuoNebs with more 

frequent treatments every 6 hours.  Continue IV Solu-Medrol for now.








(3) Hyponatremia


Is this a current diagnosis for this admission?: Yes   


Plan: 


Initial sodium was 126, but has improved.  There are multiple potential causes 

of patient's hyponatremia including patient's SSRI, Lasix.


Continue to hold Celexa


Serum osmolarity is normal and urine osmolarity is low making SIADH unlikely.  

TSH is normal as well.  Will monitor BMP for now.








(4) Protein-calorie malnutrition, moderate


Is this a current diagnosis for this admission?: Yes   





(5) Tobacco use disorder, continuous


Is this a current diagnosis for this admission?: Yes   





- Time


Time Spent with patient: 15-24 minutes

## 2020-02-26 VITALS — SYSTOLIC BLOOD PRESSURE: 109 MMHG | DIASTOLIC BLOOD PRESSURE: 48 MMHG

## 2020-02-26 LAB
ARTERIAL BLOOD FIO2: (no result)
ARTERIAL BLOOD H2CO3: 2.25 MMOL/L (ref 1.05–1.35)
ARTERIAL BLOOD HCO3: 42.9 MMOL/L (ref 20–24)
ARTERIAL BLOOD PCO2: 74.6 MMHG (ref 35–45)
ARTERIAL BLOOD PH: 7.38 (ref 7.35–7.45)
ARTERIAL BLOOD PO2: 40.6 MMHG (ref 80–100)
ARTERIAL BLOOD TOTAL CO2: 45.2 MMOL/L (ref 21–25)
BASE EXCESS BLDA CALC-SCNC: 14.2 MMOL/L
SAO2 % BLDA: 72.1 % (ref 94–98)

## 2020-02-26 RX ADMIN — HEPARIN SODIUM SCH UNIT: 5000 INJECTION, SOLUTION INTRAVENOUS; SUBCUTANEOUS at 05:55

## 2020-02-26 RX ADMIN — BUPROPION HYDROCHLORIDE SCH MG: 75 TABLET, FILM COATED ORAL at 10:12

## 2020-02-26 RX ADMIN — PANTOPRAZOLE SODIUM SCH MG: 40 TABLET, DELAYED RELEASE ORAL at 05:55

## 2020-02-26 RX ADMIN — Medication SCH ML: at 05:56

## 2020-02-26 RX ADMIN — FLUTICASONE PROPIONATE SCH: 50 SPRAY, METERED NASAL at 10:17

## 2020-02-26 RX ADMIN — IPRATROPIUM BROMIDE AND ALBUTEROL SULFATE SCH: 2.5; .5 SOLUTION RESPIRATORY (INHALATION) at 02:18

## 2020-02-26 RX ADMIN — FLUTICASONE FUROATE AND VILANTEROL TRIFENATATE SCH INHALER: 200; 25 POWDER RESPIRATORY (INHALATION) at 10:11

## 2020-02-26 RX ADMIN — MORPHINE SULFATE PRN MG: 10 INJECTION INTRAMUSCULAR; INTRAVENOUS; SUBCUTANEOUS at 06:02

## 2020-02-26 RX ADMIN — METHYLPREDNISOLONE SODIUM SUCCINATE SCH MG: 40 INJECTION, POWDER, FOR SOLUTION INTRAMUSCULAR; INTRAVENOUS at 05:55

## 2020-02-26 RX ADMIN — RISPERIDONE SCH MG: 1 TABLET ORAL at 10:12

## 2020-02-26 RX ADMIN — BENZTROPINE MESYLATE SCH MG: 1 TABLET ORAL at 10:11

## 2020-02-26 RX ADMIN — NEBIVOLOL HYDROCHLORIDE SCH MG: 10 TABLET ORAL at 10:11

## 2020-02-26 RX ADMIN — FUROSEMIDE SCH MG: 20 TABLET ORAL at 10:11

## 2020-02-26 RX ADMIN — ARIPIPRAZOLE SCH MG: 5 TABLET ORAL at 10:12

## 2020-02-26 RX ADMIN — DOCUSATE SODIUM SCH MG: 100 CAPSULE, LIQUID FILLED ORAL at 10:11

## 2020-02-26 RX ADMIN — IPRATROPIUM BROMIDE AND ALBUTEROL SULFATE SCH ML: 2.5; .5 SOLUTION RESPIRATORY (INHALATION) at 08:36

## 2020-02-26 NOTE — PDOC DISCHARGE SUMMARY
Impression





- Admit/DC Date/PCP


Admission Date/Primary Care Provider: 


  02/22/20 15:41





  





Discharge Date: 02/26/20





- Discharge Diagnosis


(1) Acute and chronic respiratory failure (acute-on-chronic)


Is this a current diagnosis for this admission?: Yes   





(2) COPD exacerbation


Is this a current diagnosis for this admission?: Yes   





(3) Hyponatremia


Is this a current diagnosis for this admission?: Yes   





(4) Protein-calorie malnutrition, moderate


Is this a current diagnosis for this admission?: Yes   





(5) Tobacco use disorder, continuous


Is this a current diagnosis for this admission?: Yes   





- Assessment


Summary: 


Patient was admitted for shortness of breath.  Chest x-ray did not reveal any 

evidence of pneumonia.  Had mild elevated white count at time of admission.  Had

notably been taking prednisone at a small dose outpatient with Levaquin for 

treatment of bronchitis.  Upon admission there was mildly elevated BNP but I do 

not believe that patient was having acute exacerbation of congestive heart 

failure.  Patient was noted to have significant wheezing.  Patient was started 

on treatment for COPD exacerbation with frequent nebulizer treatments.  Patient 

was placed on IV Solu-Medrol for several days  Patient's blood gas also revealed

significant hypercapnic respiratory failure with metabolic compensation with 

PCO2 going as high as in the 90s.  I believe patient did have some acute on 

chronic respiratory failure secondary to her COPD.  Patient was placed on BiPAP 

nocturnally and PCO2 improved to the 70s with pH of 7.38 given adequate 

metabolic compensation.  Patient's respiratory status has improved significantly

by the time of discharge patient states that she has been ambulating on her 

usual 3 L nasal cannula which he uses at home and has had no difficulty.  As a 

matter fact she was able to ambulate on room air today without any difficulty 

breathing.  Patient has been informed that she would likely benefit from 

nocturnal BiPAP or trilogy machine in place of the nocturnal CPAP which he uses 

at home.  Patient will be following up with her pulmonologist Dr. Rita Grajeda on March 10 to be qualified for nocturnal BiPAP or trilogy.   I have also

started patient on budesonide inhaler to use in combination with her Spiriva for

her COPD.  Patient has been discharged in stable conditions.





- Additional Information


Resuscitation Status: Full Code


Discharge Diet: As Tolerated


Discharge Activity: Slowly Increase Activity


Referrals: 


[facility] [Other] - 03/09/20 9:30 am


RITA GRAJEDA MD [NO LOCAL MD] - 03/10/20


Prescriptions: 


Prednisone [Deltasone 20 mg Tablet] 20 mg PO BID 3 Days


Budesonide [Pulmicort 90 mcg Flexhaler] 1 inh IH DAILY #2 inhaler


Tiotropium Bromide [Spiriva Respimat] 1 puff IH BID #2 inhaler


Albuterol Sulfate [Ventolin 0.083% Neb 2.5 mg/3 mL Ampul] 1 vial NEB Q6 #90 vial


Home Medications: 








Albuterol Sulfate [Proair HFA Inhalation Aerosol 8.5 gm MDI] 2 puff IH Q4HP PRN 

10/01/19 


Aripiprazole [Abilify 5 mg Tablet] 5 mg PO DAILY 10/01/19 


Bupropion HCl [Wellbutrin Xl 300mg 24hr Tablet] 300 mg PO DAILY 10/01/19 


Citalopram Hydrobromide [Celexa 40 mg Tablet] 40 mg PO DAILY 10/01/19 


Risperidone [Risperdal] 3 mg PO Q12 10/01/19 


Azithromycin [Zithromax 250 mg Tablet] 250 mg PO MOWEFR@1000 02/22/20 


Benztropine Mesylate [Cogentin 1 mg Tablet] 1 tab PO BID 02/22/20 


Furosemide [Lasix 20 mg Tablet] 10 mg PO BID 02/22/20 


Albuterol Sulfate [Ventolin 0.083% Neb 2.5 mg/3 mL Ampul] 1 vial NEB Q6 #90 vial

02/26/20 


Budesonide [Pulmicort 90 mcg Flexhaler] 1 inh IH DAILY #2 inhaler 02/26/20 


Prednisone [Deltasone 20 mg Tablet] 20 mg PO BID 3 Days 02/26/20 


Tiotropium Bromide [Spiriva Respimat] 1 puff IH BID #2 inhaler 02/26/20 











History of Present Illiness


History of Present Illness: 


CICI KIRKPATRICK is a 54 year old female who presented the emergency room via 

EMS with acute dyspnea.  Patient admits that she developed sudden onset of 

severe dyspnea short time before calling EMS.  She admits that her dyspnea is 

worsened with exertion, accompanied by severe air hunger and associated with 

orthopnea.  She denies other associated or accompanying signs and symptoms.  She

admits numerous prior similar episodes related to her CHF and COPD.  She has not

identified any additional aggravating or ameliorating factors for her dyspnea.  

EMS found the patient to be severely dyspneic and severely hypertensive and that

she was treated with BiPAP and IV nitroglycerin in the field.  Her symptoms 

resolved rapidly over the course of her transportation and she was significantly

improved by the time she arrived at the emergency room.  In the ER she was 

weaned off BiPAP back to 3-1/2 L/min of oxygen via nasal cannula.  The patient's

chest x-ray showed no acute process and her laboratory evaluation was 

unremarkable.  She was subsequently placed in observation for further evaluation

and treatment.








Physical Exam


Vital Signs: 


                                        











Temp Pulse Resp BP Pulse Ox


 


 98 F   92   20   109/48 L  97 


 


 02/26/20 12:12  02/26/20 12:12  02/26/20 12:12  02/26/20 12:12  02/26/20 12:12








                                 Intake & Output











 02/25/20 02/26/20 02/27/20





 06:59 06:59 06:59


 


Intake Total 2355 1430 


 


Output Total 2100 1702 


 


Balance 255 -272 


 


Weight 56.6 kg 57.6 kg 











General appearance: PRESENT: no acute distress, cooperative


Neck exam: ABSENT: JVD


Respiratory exam: PRESENT: symmetrical, unlabored, wheezes - Mild and 

significantly improved from yesterday.  ABSENT: accessory muscle use, crackles, 

retraction, tachypnea


GI/Abdominal exam: PRESENT: soft


Extremities exam: ABSENT: pedal edema





Results


Laboratory Results: 


                                        











WBC  8.5 10^3/uL (4.0-10.5)   02/24/20  05:41    


 


RBC  4.68 10^6/uL (3.72-5.28)   02/24/20  05:41    


 


Hgb  12.8 g/dL (12.0-15.5)   02/24/20  05:41    


 


Hct  39.0 % (36.0-47.0)   02/24/20  05:41    


 


MCV  83 fl (80-97)   02/24/20  05:41    


 


MCH  27.3 pg (27.0-33.4)   02/24/20  05:41    


 


MCHC  32.8 g/dL (32.0-36.0)   02/24/20  05:41    


 


RDW  15.7 % (11.5-14.0)  H  02/24/20  05:41    


 


Plt Count  292 10^3/uL (150-450)   02/24/20  05:41    


 


Lymph % (Auto)  6.0 % (13-45)  L  02/24/20  05:41    


 


Mono % (Auto)  6.6 % (3-13)   02/24/20  05:41    


 


Eos % (Auto)  0.0 % (0-6)   02/24/20  05:41    


 


Baso % (Auto)  0.3 % (0-2)   02/24/20  05:41    


 


Absolute Neuts (auto)  7.4 10^3/uL (1.7-8.2)   02/24/20  05:41    


 


Absolute Lymphs (auto)  0.5 10^3/uL (0.5-4.7)   02/24/20  05:41    


 


Absolute Monos (auto)  0.6 10^3/uL (0.1-1.4)   02/24/20  05:41    


 


Absolute Eos (auto)  0.0 10^3/uL (0.0-0.6)   02/24/20  05:41    


 


Absolute Basos (auto)  0.0 10^3/uL (0.0-0.2)   02/24/20  05:41    


 


Seg Neutrophils %  87.1 % (42-78)  H  02/24/20  05:41    


 


PT  12.4 SEC (11.4-15.4)   02/21/20  20:30    


 


INR  0.92   02/21/20  20:30    


 


APTT  26.5 SEC (23.5-35.8)   02/21/20  20:30    


 


Carbonic Acid  2.25 mmol/L (1.05-1.35)  H  02/26/20  06:32    


 


HCO3/H2CO3 Ratio  19:1   02/26/20  06:32    


 


ABG pH  7.38  (7.35-7.45)   02/26/20  06:32    


 


ABG pCO2  74.6 mmHg (35-45)  H*  02/26/20  06:32    


 


ABG pO2  40.6 mmHg ()  L*  02/26/20  06:32    


 


ABG HCO3  42.9 mmol/L (20-24)  H  02/26/20  06:32    


 


ABG Total CO2  45.2 mmol/L (21-25)  H  02/26/20  06:32    


 


ABG O2 Saturation  72.1 % (94-98)  L  02/26/20  06:32    


 


ABG Base Excess  14.2 mmol/L  02/26/20  06:32    


 


VBG pH  7.32  (7.30-7.42)   02/25/20  06:11    


 


VBG pCO2  93.9 mmHg (35-63)  H*  02/25/20  06:11    


 


VBG HCO3  47.2 mmol/L (20-32)  H  02/25/20  06:11    


 


VBG Base Excess  16.4 mmol/L  02/25/20  06:11    


 


FiO2  ROOM AIR   02/26/20  06:32    


 


Sodium  131.3 mmol/L (137-145)  L  02/25/20  06:11    


 


Potassium  4.2 mmol/L (3.6-5.0)   02/25/20  06:11    


 


Chloride  87 mmol/L ()  L  02/25/20  06:11    


 


Carbon Dioxide  41 mmol/L (22-30)  H*  02/25/20  06:11    


 


Anion Gap  3  (5-19)  L  02/25/20  06:11    


 


BUN  29 mg/dL (7-20)  H  02/25/20  06:11    


 


Creatinine  0.99 mg/dL (0.52-1.25)   02/25/20  06:11    


 


Est GFR ( Amer)  > 60  (>60)   02/25/20  06:11    


 


Est GFR (MDRD) Non-Af  58  (>60)  L  02/25/20  06:11    


 


Glucose  155 mg/dL ()  H  02/25/20  06:11    


 


Serum Osmolality  286 mOsm/kg (275-301)   02/24/20  15:36    


 


Lactic Acid  0.6 mmol/L (0.7-2.1)  L  02/21/20  20:30    


 


Calcium  8.8 mg/dL (8.4-10.2)   02/25/20  06:11    


 


Magnesium  1.8 mg/dL (1.6-2.3)   02/21/20  20:30    


 


Total Bilirubin  0.3 mg/dL (0.2-1.3)   02/21/20  20:30    


 


Direct Bilirubin  0.1 mg/dL (0.0-0.4)   02/21/20  20:30    


 


Neonat Total Bilirubin  Not Reportable   02/21/20  20:30    


 


Neonat Direct Bilirubin  Not Reportable   02/21/20  20:30    


 


Neonat Indirect Bili  Not Reportable   02/21/20  20:30    


 


AST  34 U/L (14-36)   02/21/20  20:30    


 


ALT  22 U/L (<35)   02/21/20  20:30    


 


Alkaline Phosphatase  76 U/L ()   02/21/20  20:30    


 


Creatine Kinase  58 U/L ()   02/22/20  05:16    


 


CK-MB (CK-2)  8.72 ng/mL (<4.55)  H  02/22/20  12:32    


 


Troponin I  0.019 ng/mL  02/22/20  12:32    


 


NT-Pro-B Natriuret Pep  401 pg/mL (<125)  H  02/24/20  05:41    


 


Total Protein  5.7 g/dL (6.3-8.2)  L  02/21/20  20:30    


 


Albumin  3.5 g/dL (3.5-5.0)   02/21/20  20:30    


 


TSH  1.08 uIU/mL (0.47-4.68)   02/24/20  15:36    


 


Urine Color  COLORLESS   02/21/20  21:00    


 


Urine Appearance  CLEAR   02/21/20  21:00    


 


Urine pH  6.0  (5.0-9.0)   02/21/20  21:00    


 


Ur Specific Gravity  1.002   02/21/20  21:00    


 


Urine Protein  NEGATIVE mg/dL (NEGATIVE)   02/21/20  21:00    


 


Urine Glucose (UA)  NEGATIVE mg/dL (NEGATIVE)   02/21/20  21:00    


 


Urine Ketones  NEGATIVE mg/dL (NEGATIVE)   02/21/20  21:00    


 


Urine Blood  NEGATIVE  (NEGATIVE)   02/21/20  21:00    


 


Urine Nitrite (Reflex)  NEGATIVE  (NEGATIVE)   02/21/20  21:00    


 


Urine Bilirubin  NEGATIVE  (NEGATIVE)   02/21/20  21:00    


 


Urine Urobilinogen  NEGATIVE mg/dL (<2.0)   02/21/20  21:00    


 


Leukocyte Esterase Rfl  NEGATIVE  (NEGATIVE)   02/21/20  21:00    


 


Urine RBC (Auto)  0 /HPF  02/21/20  21:00    


 


Urine WBC (Reflex)  < 1 /HPF  02/21/20  21:00    


 


Urine Osmolality  215 mOsm/kg (300-900)  L  02/24/20  20:30    


 


Urine Ascorbic Acid  NEGATIVE  (NEGATIVE)   02/21/20  21:00    


 


Influenza A (Rapid)  NEGATIVE  (NEGATIVE)   02/21/20  23:55    


 


Influenza B (Rapid)  NEGATIVE  (NEGATIVE)   02/21/20  23:55    








                                        











  02/21/20 02/21/20 02/22/20





  20:30 20:33 00:30


 


CK-MB (CK-2)    10.40 H


 


Troponin I  0.026   0.028


 


NT-Pro-B Natriuret Pep   605 H 














  02/22/20 02/22/20 02/24/20





  05:16 12:32 05:41


 


CK-MB (CK-2)  8.41 H  8.72 H 


 


Troponin I  0.021  0.019 


 


NT-Pro-B Natriuret Pep    401 H











Impressions: 


                                        





Chest X-Ray  02/21/20 20:48


IMPRESSION: COPD


 


No acute abnormality is identified.


 














Plan


Time Spent: Less than 30 Minutes





Stroke


Is this a Stroke Patient?: No





Acute Heart Failure





- **


Is this a Heart Failure Patient?: No

## 2020-07-03 ENCOUNTER — HOSPITAL ENCOUNTER (EMERGENCY)
Dept: HOSPITAL 62 - ER | Age: 55
LOS: 1 days | Discharge: HOME | End: 2020-07-04
Payer: COMMERCIAL

## 2020-07-03 DIAGNOSIS — J44.9: Primary | ICD-10-CM

## 2020-07-03 DIAGNOSIS — F17.210: ICD-10-CM

## 2020-07-03 DIAGNOSIS — R05: ICD-10-CM

## 2020-07-03 DIAGNOSIS — R53.1: ICD-10-CM

## 2020-07-03 DIAGNOSIS — E87.1: ICD-10-CM

## 2020-07-03 DIAGNOSIS — Z87.01: ICD-10-CM

## 2020-07-03 DIAGNOSIS — I10: ICD-10-CM

## 2020-07-03 LAB
ADD MANUAL DIFF: NO
BASOPHILS # BLD AUTO: 0 10^3/UL (ref 0–0.2)
BASOPHILS NFR BLD AUTO: 0.5 % (ref 0–2)
EOSINOPHIL # BLD AUTO: 0 10^3/UL (ref 0–0.6)
EOSINOPHIL NFR BLD AUTO: 0.5 % (ref 0–6)
ERYTHROCYTE [DISTWIDTH] IN BLOOD BY AUTOMATED COUNT: 14.9 % (ref 11.5–14)
HCT VFR BLD CALC: 34.5 % (ref 36–47)
HGB BLD-MCNC: 10.9 G/DL (ref 12–15.5)
LYMPHOCYTES # BLD AUTO: 0.7 10^3/UL (ref 0.5–4.7)
LYMPHOCYTES NFR BLD AUTO: 12 % (ref 13–45)
MCH RBC QN AUTO: 27 PG (ref 27–33.4)
MCHC RBC AUTO-ENTMCNC: 31.5 G/DL (ref 32–36)
MCV RBC AUTO: 86 FL (ref 80–97)
MONOCYTES # BLD AUTO: 0.4 10^3/UL (ref 0.1–1.4)
MONOCYTES NFR BLD AUTO: 6.4 % (ref 3–13)
NEUTROPHILS # BLD AUTO: 4.6 10^3/UL (ref 1.7–8.2)
NEUTS SEG NFR BLD AUTO: 80.6 % (ref 42–78)
PLATELET # BLD: 269 10^3/UL (ref 150–450)
RBC # BLD AUTO: 4.03 10^6/UL (ref 3.72–5.28)
TOTAL CELLS COUNTED % (AUTO): 100 %
WBC # BLD AUTO: 5.7 10^3/UL (ref 4–10.5)

## 2020-07-03 PROCEDURE — 87186 SC STD MICRODIL/AGAR DIL: CPT

## 2020-07-03 PROCEDURE — 99292 CRITICAL CARE ADDL 30 MIN: CPT

## 2020-07-03 PROCEDURE — 93010 ELECTROCARDIOGRAM REPORT: CPT

## 2020-07-03 PROCEDURE — 99291 CRITICAL CARE FIRST HOUR: CPT

## 2020-07-03 PROCEDURE — 87040 BLOOD CULTURE FOR BACTERIA: CPT

## 2020-07-03 PROCEDURE — 87077 CULTURE AEROBIC IDENTIFY: CPT

## 2020-07-03 PROCEDURE — 85025 COMPLETE CBC W/AUTO DIFF WBC: CPT

## 2020-07-03 PROCEDURE — 87150 DNA/RNA AMPLIFIED PROBE: CPT

## 2020-07-03 PROCEDURE — 96365 THER/PROPH/DIAG IV INF INIT: CPT

## 2020-07-03 PROCEDURE — 71045 X-RAY EXAM CHEST 1 VIEW: CPT

## 2020-07-03 PROCEDURE — 93005 ELECTROCARDIOGRAM TRACING: CPT

## 2020-07-03 PROCEDURE — 80053 COMPREHEN METABOLIC PANEL: CPT

## 2020-07-03 PROCEDURE — 36415 COLL VENOUS BLD VENIPUNCTURE: CPT

## 2020-07-03 NOTE — ER DOCUMENT REPORT
ED General





- General


Chief Complaint: Respiratory Distress


Stated Complaint: DIFFICULTY BREATHING


Time Seen by Provider: 20 23:42


Mode of Arrival: Ambulatory


Information source: Patient, Emergency Med Personnel


Notes: 





55-year-old  female arrives by EMS after she had a asthmatic attack.  

Patient reports she was seen by EMS twice tonight and was given breathing 

treatments both time.  The second visit EMS decided to take her here to the ER 

after giving her 2 g of magnesium IV given her Solu-Medrol IV and 2 A/A 

treatments.  Patient was much improved by time of arrival.  Patient reports she 

used to smoke 3 packs a day since she was 14 years old but she now only smokes 

one half a pack a day.  She denies any current alcohol use and says she quit 

drinking several years ago.


TRAVEL OUTSIDE OF THE [place] IN LAST 30 DAYS: No





- HPI


Onset: Just prior to arrival


Onset/Duration: Sudden, Persistent, Better


Quality of pain: No pain


Severity: Mild


Pain Level: 1


Associated symptoms: Nonproductive cough, Weakness


Exacerbated by: Coughing, Deep breathing


Relieved by: Denies


Similar symptoms previously: Yes


Recently seen / treated by doctor: No





- Related Data


Allergies/Adverse Reactions: 


                                        





No Known Allergies Allergy (Verified 14 03:01)


   











Past Medical History





- General


Information source: Patient





- Social History


Smoking Status: Current Every Day Smoker


Cigarette use (# per day): Yes


Chew tobacco use (# tins/day): No


Smoking Education Provided: Yes


Frequency of alcohol use: None


Drug Abuse: None


Lives with: Alone


Family History: Reviewed & Not Pertinent, COPD, Hypertension.  denies: CAD, CVA,

DM, Malignancy


Patient has suicidal ideation: No


Patient has homicidal ideation: No





- Past Medical History


Cardiac Medical History: Reports: Hx Congestive Heart Failure, Hx Hypertension


   Denies: Hx Coronary Artery Disease, Hx Heart Attack


Pulmonary Medical History: Reports: Hx Asthma, Hx Bronchitis, Hx COPD, Hx 

Pneumonia, Hx Respiratory Failure - Chronic respiratory failure with hypoxia


Neurological Medical History: Reports: Hx Seizures - Single seizure in the 

remote past.  Denies: Hx Cerebrovascular Accident


Endocrine Medical History: Denies: Hx Diabetes Mellitus Type 1, Hx Diabetes 

Mellitus Type 2, Hx Hyperthyroidism, Hx Hypothyroidism


Renal/ Medical History: Reports: Hx Kidney Stones


GI Medical History: Denies: Hx Cirrhosis, Hx Crohn's Disease, Hx 

Gastroesophageal Reflux Disease, Hx Hepatitis, Hx Ulcerative Colitis


Musculoskeletal Medical History: Reports Hx Arthritis, Denies Hx Gout


Skin Medical History: Denies Hx Eczema, Denies Hx Psoriasis


Psychiatric Medical History: Reports: Hx Bipolar Disorder, Hx Depression


Infectious Medical History: Denies: Hx Hepatitis


Past Surgical History: Reports: Hx  Section, Hx Orthopedic Surgery - 

right knee.  Denies: Hx Hysterectomy, Hx Pacemaker





- Immunizations


Hx Diphtheria, Pertussis, Tetanus Vaccination: No


Hx Pneumococcal Vaccination: 13





Review of Systems





- Review of Systems


Constitutional: No symptoms reported, See HPI, Weakness


EENT: No symptoms reported


Cardiovascular: No symptoms reported, Heart racing, Lightheaded


Respiratory: See HPI, Short of breath, Wheezing


Gastrointestinal: No symptoms reported


Genitourinary: No symptoms reported


Female Genitourinary: No symptoms reported


Musculoskeletal: No symptoms reported


Skin: No symptoms reported


Hematologic/Lymphatic: No symptoms reported


Neurological/Psychological: No symptoms reported





Physical Exam





- Vital signs


Vitals: 


                                        











Temp Pulse Resp BP Pulse Ox


 


 98.6 F   110 H  26 H  152/79 H  98 


 


 20 23:32  20 23:32  20 23:32  20 23:32  20 23:32











Interpretation: Hypertensive, Tachycardic, Tachypneic





- General


General appearance: Alert





- HEENT


Head: Normocephalic, Atraumatic


Eyes: Normal


Pupils: PERRL


Sinus: Normal


Mouth/Lips: Normal


Mucous membranes: Normal


Pharynx: Normal - Normal normal


Neck: Normal





- Respiratory


Respiratory status: Tachypnea


Chest status: Nontender


Breath sounds: Wheezing


Chest palpation: Normal





- Cardiovascular


Rhythm: Regular


Murmur: No





- Abdominal


Inspection: Normal


Distension: No distension


Bowel sounds: Normal


Tenderness: Nontender





- Rectal


Hemorrhoids: Other - deferred





- Genitourinary


Bimanuel exam: Other - deferred





- Back


Back: Normal





- Extremities


General upper extremity: Normal inspection


General lower extremity: Normal inspection





- Neurological


Neuro grossly intact: Yes


Cognition: Normal


Orientation: AAOx4


[place] Coma Scale Eye Opening: Spontaneous


Rajiv Coma Scale Verbal: Oriented


Rajiv Coma Scale Motor: Obeys Commands


[place] Coma Scale Total: 15


Speech: Normal


Motor strength normal: LUE, RUE, LLE, RLE


Sensory: Normal





- Psychological


Associated symptoms: Normal affect





- Skin


Skin Temperature: Warm


Skin Moisture: Dry





Course





- Vital Signs


Vital signs: 


                                        











Temp Pulse Resp BP Pulse Ox


 


 98.6 F   110 H  26 H  152/79 H  98 


 


 20 23:32  20 23:32  20 23:32  20 23:32  20 23:32














- Laboratory


Result Diagrams: 


                                 20 23:38





                                 20 23:38


Laboratory results interpreted by me: 


                                        











  20





  23:38 23:38


 


Hgb  10.9 L 


 


Hct  34.5 L 


 


MCHC  31.5 L 


 


RDW  14.9 H 


 


Lymph % (Auto)  12.0 L 


 


Seg Neutrophils %  80.6 H 


 


Sodium   129.6 L


 


Chloride   85 L


 


Carbon Dioxide   45 H*


 


Anion Gap   0 L


 


Total Protein   5.9 L














- Diagnostic Test


Radiology reviewed: Reports reviewed





Critical Care Note





- Critical Care Note


Total time excluding time spent on procedures (mins): 90


Comments: 





Patient much improved and ready to go home driven by her .  The time now 

is 0 125





Discharge





- Discharge


Clinical Impression: 


 Tobacco use disorder, continuous, Hyponatremia, Bronchitis





Chronic obstructive pulmonary disease


Qualifiers:


 COPD type: unspecified COPD Qualified Code(s): J44.9 - Chronic obstructive 

pulmonary disease, unspecified





Condition: Good


Disposition: HOME, SELF-CARE


Additional Instructions: 


Follow-up with personal doctor this week; return to ER as needed if symptoms 

worsen ;take medicines as directed encourage fluids and try to stay out of the 

heat with your COPD


Prescriptions: 


Prednisone [Deltasone 20 mg Tablet] 1 tab PO DAILY 4 Days #4 tablet


Azithromycin [Zithromax 250 mg Tablet] 250 mg PO ASDIR PRN #6 tablet


 PRN Reason:

## 2020-07-04 VITALS — SYSTOLIC BLOOD PRESSURE: 168 MMHG | DIASTOLIC BLOOD PRESSURE: 90 MMHG

## 2020-07-04 LAB
ALBUMIN SERPL-MCNC: 3.6 G/DL (ref 3.5–5)
ALP SERPL-CCNC: 94 U/L (ref 38–126)
ANION GAP SERPL CALC-SCNC: 0 MMOL/L (ref 5–19)
AST SERPL-CCNC: 21 U/L (ref 14–36)
BILIRUB DIRECT SERPL-MCNC: 0 MG/DL (ref 0–0.4)
BILIRUB SERPL-MCNC: 0.5 MG/DL (ref 0.2–1.3)
BUN SERPL-MCNC: 9 MG/DL (ref 7–20)
CALCIUM: 9.3 MG/DL (ref 8.4–10.2)
CHLORIDE SERPL-SCNC: 85 MMOL/L (ref 98–107)
CO2 SERPL-SCNC: 45 MMOL/L (ref 22–30)
GLUCOSE SERPL-MCNC: 102 MG/DL (ref 75–110)
POTASSIUM SERPL-SCNC: 3.8 MMOL/L (ref 3.6–5)
PROT SERPL-MCNC: 5.9 G/DL (ref 6.3–8.2)

## 2020-07-04 NOTE — EKG REPORT
SEVERITY:- ABNORMAL ECG -

SINUS RHYTHM

INCOMPLETE RIGHT BUNDLE BRANCH BLOCK

:

Confirmed by: Eleanor Cha MD 04-Jul-2020 12:03:10

## 2020-07-04 NOTE — RADIOLOGY REPORT (SQ)
EXAM:

  XR Chest, 1 View



EXAM DATE/TIME:

  07/03/2020 23:38



CLINICAL HISTORY:

  The patient is 55 years old and is Female; shortness of breath



TECHNIQUE:

  Frontal view of the chest.



COMPARISON:

  Chest radiograph from 02/21/2020



FINDINGS:

  LUNGS: Small focus of increased density in the lateral aspect

of the right midlung may represent scarring. There is also

minimal faint increased density in the right lower lung

suggesting atelectasis or mild infiltrate. The left lung is

clear.

  PLEURAL SPACE:  Unremarkable.  No pneumothorax.

  HEART:  No significant enlargement of the cardiac silhouette.

  MEDIASTINUM:  Unremarkable.

  BONES/JOINTS:  There is chronic separation of the

acromioclavicular joint on the right. Chronic changes of the left

humeral head which are likely related to remote fracture. No

obvious acute fracture.



IMPRESSION: 

Faint increased density in the right lower lung suggesting

atelectasis or mild infiltrate.

## 2020-07-07 ENCOUNTER — HOSPITAL ENCOUNTER (EMERGENCY)
Dept: HOSPITAL 62 - ER | Age: 55
Discharge: HOME | End: 2020-07-07
Payer: COMMERCIAL

## 2020-07-07 VITALS — SYSTOLIC BLOOD PRESSURE: 173 MMHG | DIASTOLIC BLOOD PRESSURE: 76 MMHG

## 2020-07-07 DIAGNOSIS — I11.0: ICD-10-CM

## 2020-07-07 DIAGNOSIS — Z79.899: ICD-10-CM

## 2020-07-07 DIAGNOSIS — R04.2: ICD-10-CM

## 2020-07-07 DIAGNOSIS — F12.10: ICD-10-CM

## 2020-07-07 DIAGNOSIS — J44.1: Primary | ICD-10-CM

## 2020-07-07 DIAGNOSIS — F31.9: ICD-10-CM

## 2020-07-07 DIAGNOSIS — I50.9: ICD-10-CM

## 2020-07-07 DIAGNOSIS — Z87.01: ICD-10-CM

## 2020-07-07 DIAGNOSIS — F17.200: ICD-10-CM

## 2020-07-07 LAB
ADD MANUAL DIFF: NO
ALBUMIN SERPL-MCNC: 3.9 G/DL (ref 3.5–5)
ALP SERPL-CCNC: 83 U/L (ref 38–126)
ANION GAP SERPL CALC-SCNC: 3 MMOL/L (ref 5–19)
APPEARANCE UR: CLEAR
APTT PPP: COLORLESS S
AST SERPL-CCNC: 22 U/L (ref 14–36)
BASOPHILS # BLD AUTO: 0 10^3/UL (ref 0–0.2)
BASOPHILS NFR BLD AUTO: 0.1 % (ref 0–2)
BILIRUB DIRECT SERPL-MCNC: 0 MG/DL (ref 0–0.4)
BILIRUB SERPL-MCNC: 0.4 MG/DL (ref 0.2–1.3)
BILIRUB UR QL STRIP: NEGATIVE
BUN SERPL-MCNC: 9 MG/DL (ref 7–20)
CALCIUM: 9.3 MG/DL (ref 8.4–10.2)
CHLORIDE SERPL-SCNC: 83 MMOL/L (ref 98–107)
CO2 SERPL-SCNC: 45 MMOL/L (ref 22–30)
CRP SERPL-MCNC: < 5 MG/L (ref ?–10)
EOSINOPHIL # BLD AUTO: 0 10^3/UL (ref 0–0.6)
EOSINOPHIL NFR BLD AUTO: 0 % (ref 0–6)
ERYTHROCYTE [DISTWIDTH] IN BLOOD BY AUTOMATED COUNT: 14.8 % (ref 11.5–14)
GLUCOSE SERPL-MCNC: 169 MG/DL (ref 75–110)
GLUCOSE UR STRIP-MCNC: NEGATIVE MG/DL
HCT VFR BLD CALC: 38.9 % (ref 36–47)
HGB BLD-MCNC: 12.2 G/DL (ref 12–15.5)
KETONES UR STRIP-MCNC: NEGATIVE MG/DL
LYMPHOCYTES # BLD AUTO: 0.4 10^3/UL (ref 0.5–4.7)
LYMPHOCYTES NFR BLD AUTO: 6.9 % (ref 13–45)
MCH RBC QN AUTO: 26.8 PG (ref 27–33.4)
MCHC RBC AUTO-ENTMCNC: 31.4 G/DL (ref 32–36)
MCV RBC AUTO: 85 FL (ref 80–97)
MONOCYTES # BLD AUTO: 0.2 10^3/UL (ref 0.1–1.4)
MONOCYTES NFR BLD AUTO: 2.8 % (ref 3–13)
NEUTROPHILS # BLD AUTO: 4.9 10^3/UL (ref 1.7–8.2)
NEUTS SEG NFR BLD AUTO: 90.2 % (ref 42–78)
NITRITE UR QL STRIP: NEGATIVE
PH UR STRIP: 8 [PH] (ref 5–9)
PLATELET # BLD: 272 10^3/UL (ref 150–450)
POTASSIUM SERPL-SCNC: 4.5 MMOL/L (ref 3.6–5)
PROT SERPL-MCNC: 6.5 G/DL (ref 6.3–8.2)
PROT UR STRIP-MCNC: NEGATIVE MG/DL
RBC # BLD AUTO: 4.56 10^6/UL (ref 3.72–5.28)
SP GR UR STRIP: 1
TOTAL CELLS COUNTED % (AUTO): 100 %
UROBILINOGEN UR-MCNC: NEGATIVE MG/DL (ref ?–2)
WBC # BLD AUTO: 5.4 10^3/UL (ref 4–10.5)

## 2020-07-07 PROCEDURE — 36415 COLL VENOUS BLD VENIPUNCTURE: CPT

## 2020-07-07 PROCEDURE — 87077 CULTURE AEROBIC IDENTIFY: CPT

## 2020-07-07 PROCEDURE — 71046 X-RAY EXAM CHEST 2 VIEWS: CPT

## 2020-07-07 PROCEDURE — 87150 DNA/RNA AMPLIFIED PROBE: CPT

## 2020-07-07 PROCEDURE — 87086 URINE CULTURE/COLONY COUNT: CPT

## 2020-07-07 PROCEDURE — 99285 EMERGENCY DEPT VISIT HI MDM: CPT

## 2020-07-07 PROCEDURE — 94640 AIRWAY INHALATION TREATMENT: CPT

## 2020-07-07 PROCEDURE — 80053 COMPREHEN METABOLIC PANEL: CPT

## 2020-07-07 PROCEDURE — 85025 COMPLETE CBC W/AUTO DIFF WBC: CPT

## 2020-07-07 PROCEDURE — 87040 BLOOD CULTURE FOR BACTERIA: CPT

## 2020-07-07 PROCEDURE — 86140 C-REACTIVE PROTEIN: CPT

## 2020-07-07 PROCEDURE — 81001 URINALYSIS AUTO W/SCOPE: CPT

## 2020-07-07 NOTE — ER DOCUMENT REPORT
ED General





- General


Chief Complaint: Abnormal Lab Results


Stated Complaint: ABNORMAL LABS


Time Seen by Provider: 20 12:47


Primary Care Provider: 


JASPER BLACKWOOD FNP-C [Primary Care Provider] - Follow up as needed


Mode of Arrival: Ambulatory


TRAVEL OUTSIDE OF THE [place] IN LAST 30 DAYS: No





- HPI


Notes: 





Patient is a 55-year-old female with a history of COPD who presents to the 

emergency department for evaluation.  She was notified of positive blood 

cultures.  The patient was seen here on .  She was here because she was 

coughing and short of breath.  She was sent home with Zithromax and prednisone. 

She states she continues to cough, she states her shortness of breath is 

improved somewhat.  She states she does need a refill of her albuterol.  She has

had absolutely no fevers or chills.  She states she did have one episode of 

hemoptysis, a few episodes of diarrhea yesterday, but this seems to have 

resolved.  She denies any pain.  She does continue to smoke.





- Related Data


Allergies/Adverse Reactions: 


                                        





No Known Allergies Allergy (Verified 20 12:47)


   








Home Medications: Lasix, citalopram, buproprion, risperdone





Past Medical History





- General


Information source: Patient





- Social History


Smoking Status: Current Every Day Smoker


Frequency of alcohol use: None


Drug Abuse: Marijuana


Family History: Reviewed & Not Pertinent, COPD, Hypertension.  denies: CAD, CVA,

DM, Malignancy





- Past Medical History


Cardiac Medical History: Reports: Hx Congestive Heart Failure, Hx Hypertension


   Denies: Hx Coronary Artery Disease, Hx Heart Attack


Pulmonary Medical History: Reports: Hx Asthma, Hx Bronchitis, Hx COPD, Hx Pneumo

mary, Hx Respiratory Failure - Chronic respiratory failure with hypoxia


Neurological Medical History: Reports: Hx Seizures - Single seizure in the 

remote past.  Denies: Hx Cerebrovascular Accident


Endocrine Medical History: Denies: Hx Diabetes Mellitus Type 1, Hx Diabetes 

Mellitus Type 2, Hx Hyperthyroidism, Hx Hypothyroidism


Renal/ Medical History: Reports: Hx Kidney Stones


GI Medical History: Denies: Hx Cirrhosis, Hx Crohn's Disease, Hx 

Gastroesophageal Reflux Disease, Hx Hepatitis, Hx Ulcerative Colitis


Musculoskeletal Medical History: Reports Hx Arthritis, Denies Hx Gout


Skin Medical History: Denies Hx Eczema, Denies Hx Psoriasis


Psychiatric Medical History: Reports: Hx Bipolar Disorder, Hx Depression


Infectious Medical History: Denies: Hx Hepatitis


Past Surgical History: Reports: Hx  Section, Hx Orthopedic Surgery - 

right knee.  Denies: Hx Hysterectomy, Hx Pacemaker





- Immunizations


Hx Diphtheria, Pertussis, Tetanus Vaccination: No


Hx Pneumococcal Vaccination: 13





Review of Systems





- Review of Systems


Respiratory: See HPI


-: Yes All other systems reviewed and negative





Physical Exam





- Vital signs


Vitals: 


                                        











Temp Pulse Resp BP Pulse Ox


 


 97.7 F   64   20   131/64 H  97 


 


 20 12:37  20 12:37  20 12:37  20 12:37  20 12:37














- Notes


Notes: 





This is a 55-year-old female who appears much older than her stated age, in no 

acute distress.  Vital signs reviewed, please refer to chart. Head is 

normocephalic, atraumatic.  Pupils equal round, reactive to light.  Neck is 

supple without meningismus.  Heart is regular rate and rhythm.  Lungs reveal si

gnificant expiratory wheezes and prolonged expiratory phase.  No increased work 

of breathing noted.  Abdomen is soft, nontender, normoactive bowel sounds 

throughout.  Extremities without cyanosis, clubbing. Posterior calves are 

nontender.  Peripheral pulses are equal.  Skin is warm and dry.  Patient is 

awake, alert, neurological exam is nonfocal.





Course





- Re-evaluation


Re-evalutation: 





20 15:26


Patient presents to the emergency department for evaluation.  She has a history 

of COPD, is here for shortness of breath, and had blood cultures ordered.  I did

review the blood cultures.  They show MRSA, MSSA, Streptococcus.  I suspect this

is all contaminant.  The patient has no leukocytosis.  She has no fevers.  She 

had no pneumonia.  She has no pneumonia today.  I will order urinalysis to be 

sure there is no signs of infection there, otherwise I will have her follow-up 

with her primary care provider.


20 16:24


Patient's urine is unremarkable.  Again she has multiple organisms growing in 

her blood.  I strongly suspect contaminant in this patient who has no fever, no 

fever at home, no leukocytosis, and certainly does not look toxic enough to have

MRSA bacteremia along with other staph and strep species.  I will have her 

continue her Zithromax and prednisone as recently directed for her COPD 

exacerbation.  Otherwise, I will send her home with a albuterol prescription, 

she states she needs 1, close follow-up with primary care, and instructions to 

return to the ED with fevers or any worsening.





- Vital Signs


Vital signs: 


                                        











Temp Pulse Resp BP Pulse Ox


 


 97.8 F   101 H  18   173/76 H  97 


 


 20 15:28  20 15:28  20 15:28  20 15:28  20 15:28














- Laboratory


Result Diagrams: 


                                 20 13:03





                                 20 13:03


Laboratory results interpreted by me: 


                                        











  20





  13:03 13:03


 


MCH  26.8 L 


 


MCHC  31.4 L 


 


RDW  14.8 H 


 


Lymph % (Auto)  6.9 L 


 


Mono % (Auto)  2.8 L 


 


Absolute Lymphs (auto)  0.4 L 


 


Seg Neutrophils %  90.2 H 


 


Sodium   130.6 L


 


Chloride   83 L


 


Carbon Dioxide   45 H*


 


Anion Gap   3 L


 


Est GFR (MDRD) Non-Af   59 L


 


Glucose   169 H














- Diagnostic Test


Radiology reviewed: Reports reviewed


Radiology results interpreted by me: 





20 16:25





                                        





Chest X-Ray  20 12:51


IMPRESSION:  Hyperinflated lungs which can be seen with obstructive lung 

disease.  No acute cardiopulmonary disease.


 














Discharge





- Discharge


Clinical Impression: 


 COPD exacerbation, Contamination of blood culture





Condition: Stable


Disposition: HOME, SELF-CARE


Instructions:  Chronic Obstructive Lung Disease ([facility])


Additional Instructions: 


Given your lack of fevers, urine unremarkable blood work, I do not believe that 

the blood cultures you had the other day were truly positive.  They are being 

repeated, you will be contacted if they are positive.  Otherwise, continue your 

home medications as prescribed.  Follow-up with primary care this week.  If you 

develop fevers, difficulty breathing, or any other new or concerning symptoms, 

return immediately to the emergency department for evaluation.


Forms:  Smoking Cessation Education


Referrals: 


JASPER BLACKWOOD FNP-C [Primary Care Provider] - Follow up as needed

## 2020-07-07 NOTE — RADIOLOGY REPORT (SQ)
EXAM DESCRIPTION:  CHEST 2 VIEWS



IMAGES COMPLETED DATE/TIME:  7/7/2020 12:17 pm



REASON FOR STUDY:  recent visit pneumonia MRSA



COMPARISON:  7/4/2020.



EXAM PARAMETERS:  NUMBER OF VIEWS: two views

TECHNIQUE: Digital Frontal and Lateral radiographic views of the chest acquired.

RADIATION DOSE: NA

LIMITATIONS: none



FINDINGS:  LUNGS AND PLEURA:  Lungs are hyperinflated.  No opacities, masses or pneumothorax. No pleu
ral effusion.

MEDIASTINUM AND HILAR STRUCTURES: No masses or contour abnormalities.

HEART AND VASCULAR STRUCTURES: Heart normal size.  No evidence for failure.

BONES: No acute findings.

HARDWARE: None in the chest.

OTHER: No other significant finding.



IMPRESSION:  Hyperinflated lungs which can be seen with obstructive lung disease.  No acute cardiopul
monary disease.



TECHNICAL DOCUMENTATION:  JOB ID:  [id number]

 [facility] [employer] [facility]- All Rights Reserved



Reading location - IP/workstation name: [email or web address]

## 2020-07-07 NOTE — ER DOCUMENT REPORT
ED Medical Screen (RME)





- General


Chief Complaint: Abnormal Lab Results


Stated Complaint: ABNORMAL LABS


Time Seen by Provider: 20 12:47


Mode of Arrival: Ambulatory


Information source: Patient


Notes: 





55-year-old female presented to ED for positive blood cultures x2 with 

methicillin-resistant staph aureus.  She states she was sent home on a 

azithromycin.  She came in for trouble breathing.  She does have COPD.  She is 

on 3 L O2 at this time.  She is having a very hard time breathing to the mass as

she takes it off.  Patient is alert and oriented and able to answer questions 

appropriately.  We will repeat labs and have seen in the main ER.

















I have greeted and performed a rapid initial assessment of this patient.  A 

comprehensive ED assessment and evaluation of the patient, analysis of test 

results and completion of medical decision making process will be conducted by 

an additional ED providers.


TRAVEL OUTSIDE OF THE [place] IN LAST 30 DAYS: No





- Related Data


Allergies/Adverse Reactions: 


                                        





No Known Allergies Allergy (Verified 20 12:47)


   











Past Medical History





- Past Medical History


Cardiac Medical History: Reports: Hx Congestive Heart Failure, Hx Hypertension


   Denies: Hx Coronary Artery Disease, Hx Heart Attack


Pulmonary Medical History: Reports: Hx Asthma, Hx Bronchitis, Hx COPD, Hx 

Pneumonia, Hx Respiratory Failure - Chronic respiratory failure with hypoxia


Neurological Medical History: Reports: Hx Seizures - Single seizure in the 

remote past.  Denies: Hx Cerebrovascular Accident


Endocrine Medical History: Denies: Hx Diabetes Mellitus Type 1, Hx Diabetes 

Mellitus Type 2, Hx Hyperthyroidism, Hx Hypothyroidism


Renal/ Medical History: Reports: Hx Kidney Stones


GI Medical History: Denies: Hx Cirrhosis, Hx Crohn's Disease, Hx 

Gastroesophageal Reflux Disease, Hx Hepatitis, Hx Ulcerative Colitis


Musculoskeltal Medical History: Reports Hx Arthritis, Denies Hx Gout


Skin Medical History: Denies Hx Eczema, Denies Hx Psoriasis


Psychiatric Medical History: Reports: Hx Bipolar Disorder, Hx Depression


Infectious Medical History: Denies: Hx Hepatitis


Past Surgical History: Reports: Hx  Section, Hx Orthopedic Surgery - 

right knee.  Denies: Hx Hysterectomy, Hx Pacemaker





- Immunizations


Hx Diphtheria, Pertussis, Tetanus Vaccination: No





Physical Exam





- Vital signs


Vitals: 





                                        











Temp Pulse Resp BP Pulse Ox


 


 97.7 F   64   20   131/64 H  97 


 


 20 12:37  20 12:37  20 12:37  20 12:37  20 12:37














Course





- Vital Signs


Vital signs: 





                                        











Temp Pulse Resp BP Pulse Ox


 


 97.7 F   64   20   131/64 H  97 


 


 20 12:37  20 12:37  20 12:37  20 12:37  20 12:37

## 2020-09-16 ENCOUNTER — HOSPITAL ENCOUNTER (OUTPATIENT)
Dept: HOSPITAL 62 - ER | Age: 55
Setting detail: OBSERVATION
LOS: 1 days | Discharge: LEFT BEFORE DISCHARGE | End: 2020-09-17
Attending: NURSE PRACTITIONER | Admitting: FAMILY MEDICINE
Payer: COMMERCIAL

## 2020-09-16 DIAGNOSIS — F31.9: ICD-10-CM

## 2020-09-16 DIAGNOSIS — F17.200: ICD-10-CM

## 2020-09-16 DIAGNOSIS — Z82.49: ICD-10-CM

## 2020-09-16 DIAGNOSIS — I50.9: ICD-10-CM

## 2020-09-16 DIAGNOSIS — E87.1: ICD-10-CM

## 2020-09-16 DIAGNOSIS — J96.22: Primary | ICD-10-CM

## 2020-09-16 DIAGNOSIS — I11.0: ICD-10-CM

## 2020-09-16 DIAGNOSIS — I45.10: ICD-10-CM

## 2020-09-16 DIAGNOSIS — J44.1: ICD-10-CM

## 2020-09-16 DIAGNOSIS — J96.21: ICD-10-CM

## 2020-09-16 DIAGNOSIS — Z79.899: ICD-10-CM

## 2020-09-16 LAB
ADD MANUAL DIFF: NO
ALBUMIN SERPL-MCNC: 3.8 G/DL (ref 3.5–5)
ALP SERPL-CCNC: 101 U/L (ref 38–126)
ANION GAP SERPL CALC-SCNC: 5 MMOL/L (ref 5–19)
APPEARANCE UR: CLEAR
APTT PPP: COLORLESS S
AST SERPL-CCNC: 22 U/L (ref 14–36)
BASOPHILS # BLD AUTO: 0 10^3/UL (ref 0–0.2)
BASOPHILS NFR BLD AUTO: 0.3 % (ref 0–2)
BILIRUB DIRECT SERPL-MCNC: 0.3 MG/DL (ref 0–0.4)
BILIRUB SERPL-MCNC: 0.4 MG/DL (ref 0.2–1.3)
BILIRUB UR QL STRIP: NEGATIVE
BUN SERPL-MCNC: 16 MG/DL (ref 7–20)
CALCIUM: 8.7 MG/DL (ref 8.4–10.2)
CHLORIDE SERPL-SCNC: 79 MMOL/L (ref 98–107)
CO2 SERPL-SCNC: 38 MMOL/L (ref 22–30)
EOSINOPHIL # BLD AUTO: 0 10^3/UL (ref 0–0.6)
EOSINOPHIL NFR BLD AUTO: 0.3 % (ref 0–6)
ERYTHROCYTE [DISTWIDTH] IN BLOOD BY AUTOMATED COUNT: 13.9 % (ref 11.5–14)
GLUCOSE SERPL-MCNC: 101 MG/DL (ref 75–110)
GLUCOSE UR STRIP-MCNC: NEGATIVE MG/DL
HCT VFR BLD CALC: 34 % (ref 36–47)
HGB BLD-MCNC: 11.3 G/DL (ref 12–15.5)
KETONES UR STRIP-MCNC: NEGATIVE MG/DL
LYMPHOCYTES # BLD AUTO: 1.7 10^3/UL (ref 0.5–4.7)
LYMPHOCYTES NFR BLD AUTO: 18.4 % (ref 13–45)
MCH RBC QN AUTO: 27.7 PG (ref 27–33.4)
MCHC RBC AUTO-ENTMCNC: 33.4 G/DL (ref 32–36)
MCV RBC AUTO: 83 FL (ref 80–97)
MONOCYTES # BLD AUTO: 0.8 10^3/UL (ref 0.1–1.4)
MONOCYTES NFR BLD AUTO: 8.2 % (ref 3–13)
NEUTROPHILS # BLD AUTO: 6.9 10^3/UL (ref 1.7–8.2)
NEUTS SEG NFR BLD AUTO: 72.8 % (ref 42–78)
NITRITE UR QL STRIP: NEGATIVE
PH UR STRIP: 7 [PH] (ref 5–9)
PLATELET # BLD: 285 10^3/UL (ref 150–450)
POTASSIUM SERPL-SCNC: 3.9 MMOL/L (ref 3.6–5)
PROT SERPL-MCNC: 6 G/DL (ref 6.3–8.2)
PROT UR STRIP-MCNC: NEGATIVE MG/DL
RBC # BLD AUTO: 4.1 10^6/UL (ref 3.72–5.28)
SP GR UR STRIP: 1
TOTAL CELLS COUNTED % (AUTO): 100 %
UROBILINOGEN UR-MCNC: NEGATIVE MG/DL (ref ?–2)
WBC # BLD AUTO: 9.5 10^3/UL (ref 4–10.5)

## 2020-09-16 PROCEDURE — 85025 COMPLETE CBC W/AUTO DIFF WBC: CPT

## 2020-09-16 PROCEDURE — 99291 CRITICAL CARE FIRST HOUR: CPT

## 2020-09-16 PROCEDURE — G0378 HOSPITAL OBSERVATION PER HR: HCPCS

## 2020-09-16 PROCEDURE — 83735 ASSAY OF MAGNESIUM: CPT

## 2020-09-16 PROCEDURE — 82553 CREATINE MB FRACTION: CPT

## 2020-09-16 PROCEDURE — 94660 CPAP INITIATION&MGMT: CPT

## 2020-09-16 PROCEDURE — 80048 BASIC METABOLIC PNL TOTAL CA: CPT

## 2020-09-16 PROCEDURE — 36600 WITHDRAWAL OF ARTERIAL BLOOD: CPT

## 2020-09-16 PROCEDURE — 82803 BLOOD GASES ANY COMBINATION: CPT

## 2020-09-16 PROCEDURE — 94640 AIRWAY INHALATION TREATMENT: CPT

## 2020-09-16 PROCEDURE — 85027 COMPLETE CBC AUTOMATED: CPT

## 2020-09-16 PROCEDURE — 71045 X-RAY EXAM CHEST 1 VIEW: CPT

## 2020-09-16 PROCEDURE — 80053 COMPREHEN METABOLIC PANEL: CPT

## 2020-09-16 PROCEDURE — 82550 ASSAY OF CK (CPK): CPT

## 2020-09-16 PROCEDURE — 81001 URINALYSIS AUTO W/SCOPE: CPT

## 2020-09-16 PROCEDURE — 36415 COLL VENOUS BLD VENIPUNCTURE: CPT

## 2020-09-16 PROCEDURE — 84484 ASSAY OF TROPONIN QUANT: CPT

## 2020-09-16 NOTE — ER DOCUMENT REPORT
ED Respiratory Problem





- General


Chief Complaint: Shortness Of Breath


Stated Complaint: RESPIRATORY DISTRESS


Time Seen by Provider: 20 22:04


TRAVEL OUTSIDE OF THE [place] IN LAST 30 DAYS: No





- HPI


Patient complains to provider of: COPD, Cough, Short of breath.  No: Chest pain,

Hurts to breath


Onset: This morning


Duration: Worse/persistent


Quality of pain: No pain


Context: Hx COPD


Short of Breath: Severe


Cough: Productive


Sputum amount: Scant


Sputum color: Clear


EMS treatments: Bronchodilators


Associated symptoms: Cough, Difficulty breathing.  denies: Fever


Similar symptoms previously: Yes


Recently seen / treated by doctor: Yes


Notes: 





Patient is a 55-year-old female who presents with shortness of breath.  Patient 

has a history of COPD.  She is a smoker.  Patient states that she began to feel 

shortness of breath and wheezing around 3 AM yesterday.  She tried her bron

chodilators at home with minimal relief.  She states she has had a cough with 

slight clear sputum.  Patient denies any chest pain.  No fevers.  Patient states

that she was recently admitted to [facility] 2 weeks ago with a COPD 

exacerbation.  She was tested for COVID at that time and it was negative.  

Patient denies any history of blood clots.  She has not had any surgery or 

recent immobilization. 





- Related Data


Allergies/Adverse Reactions: 


                                        





No Known Allergies Allergy (Verified 20 12:47)


   











Past Medical History





- General


Information source: Patient





- Social History


Smoking Status: Current Every Day Smoker


Chew tobacco use (# tins/day): No


Frequency of alcohol use: Rare


Drug Abuse: None


Family History: Reviewed & Not Pertinent, COPD, Hypertension.  denies: CAD, CVA,

DM, Malignancy


Patient has homicidal ideation: No





- Past Medical History


Cardiac Medical History: Reports: Hx Congestive Heart Failure, Hx Hypertension


   Denies: Hx Coronary Artery Disease, Hx Heart Attack


Pulmonary Medical History: Reports: Hx Asthma, Hx Bronchitis, Hx COPD, Hx 

Pneumonia, Hx Respiratory Failure - Chronic respiratory failure with hypoxia


Neurological Medical History: Reports: Hx Seizures - Single seizure in the 

remote past.  Denies: Hx Cerebrovascular Accident


Endocrine Medical History: Denies: Hx Diabetes Mellitus Type 1, Hx Diabetes 

Mellitus Type 2, Hx Hyperthyroidism, Hx Hypothyroidism


Renal/ Medical History: Reports: Hx Kidney Stones


GI Medical History: Denies: Hx Cirrhosis, Hx Crohn's Disease, Hx 

Gastroesophageal Reflux Disease, Hx Hepatitis, Hx Ulcerative Colitis


Musculoskeletal Medical History: Reports Hx Arthritis, Denies Hx Gout


Skin Medical History: Denies Hx Eczema, Denies Hx Psoriasis


Psychiatric Medical History: Reports: Hx Bipolar Disorder, Hx Depression


Infectious Medical History: Denies: Hx Hepatitis


Past Surgical History: Reports: Hx  Section, Hx Orthopedic Surgery - 

right knee.  Denies: Hx Hysterectomy, Hx Pacemaker





- Immunizations


Hx Diphtheria, Pertussis, Tetanus Vaccination: No


Hx Pneumococcal Vaccination: 13





Review of Systems





- Review of Systems


Notes: 





CONSTITUTIONAL:  No fever. 


SKIN:  No rash.  


HENT:  No congestion, ear pain, or sore throat.  


EYES:  No recent vision problems or eye pain.  


CARDIOVASCULAR:  No chest pain or edema.


RESPIRATORY: Positive for cough, shortness of breath, and wheezing


GASTROINTESTINAL:  No abdominal pain 


MUSCULOSKELETAL:  No joint pain or swelling.  


LYMPHATIC: No swollen glands. 


NEUROLOGIC:  No seizures. No headache, focal weakness or sensory changes. 


HEMATOLOGIC:  No unusual bruising or bleeding.  


PSYCHIATRIC:  No depression or anxiety.





Physical Exam





- Vital signs


Vitals: 


                                        











Temp


 


 97.8 F 


 


 20 21:54











Interpretation: Normal





- Notes


Notes: 





VITAL SIGNS: Mild tachycardia.  Tachypneic with increased work of breathing.


GENERAL:  On Bipap, able to answer questions


HEAD:  Normal with no signs of head trauma.


EYES:  Conjunctiva normal, no discharge.  


NOSE: Normal.


NECK:  No JVD   


CHEST: Bilateral wheezing and tight lung sounds.


CARDIAC:  Regular rate and rhythm.  S1 and S2, without murmurs, gallops, or 

rubs.


VASCULAR:  No Edema.  Peripheral pulses normal and equal in all extremities.


ABDOMEN: Normal and soft with no tenderness


GENITOURINARY: Normal, No tenderness


LYMPATHTIC:  No lymphadenopathy noted.


MUSCULOSKELETAL:  Good range of motion of all major joints. Extremities without 

clubbing, cyanosis or edema.  


NEUROLOGICAL:  Alert and oriented x 3.  No focal sensory or strength deficits.  

Speech normal.  Follows commands appropriately.


PSYCHIATRIC:  Normal Affect, judgement and mood.


SKIN:  Normal appearance with no rashes or lesions.





Course





- Re-evaluation


Re-evalutation: 





20 22:30


Patient is doing well on BiPAP.  She does become short of breath when it was 

taken off.  Patient received Solu-Medrol, albuterol, Atrovent, magnesium from 

EMS.  We will give her another DuoNeb.  Patient is requesting to be placed on 3 

L.  We will give her a BiPAP break.  I informed patient that she has 

hyponatremia.  She states she has had this in the past but is unsure how low it 

has been.  This is the lowest is been in our records.  Patient denies any 

diarrhea or vomiting.  I placed her on a slow infusion of normal saline.  

Patient will need to be admitted for further care and monitoring.  She is 

agreeable to this.


20 01:49








- Vital Signs


Vital signs: 


                                        











Temp Pulse Resp BP Pulse Ox


 


 97.8 F      37 H  186/84 H  96 


 


 20 21:55     20 08:01  20 08:01  20 08:01














- Laboratory


Result Diagrams: 


                                 20 04:00





                                 20 04:00


Laboratory results interpreted by me: 


                                        











  20





  22:03 22:03


 


Hgb  11.3 L 


 


Hct  34.0 L 


 


Sodium   121.5 L


 


Chloride   79 L


 


Carbon Dioxide   38 H


 


Total Protein   6.0 L














- EKG Interpretation by Me


EKG shows normal: Sinus rhythm


Rate: Tachycardia


When compared to previous EKG there are: Previous EKG unavailable


Additional EKG results interpreted by me: 





20 22:31


EKG interpreted by me.  Sinus tachycardia at a rate of 102.  Incomplete right 

bundle branch block.  QTc 511.  No acute ST changes.  No previous EKG available 

for comparison.





Critical Care Note





- Critical Care Note


Total time excluding time spent on procedures (mins): 30


Comments: 





Upon my evaluation, this patient had a high probability of life-threatening 

deterioration due to respiratory distress, which required my direct attention 

and personal management.  I personally provided 30 minutes of critical care 

time.  Time includes review of laboratory data, radiology results, discussion 

with consultants, and monitoring for potential decompensation.





Discharge





- Discharge


Clinical Impression: 


 Respiratory distress, Hyponatremia





Dyspnea


Qualifiers:


 Dyspnea type: acute respiratory distress Qualified Code(s): R06.03 - Acute 

respiratory distress





Condition: Stable


Disposition: AGAINST MEDICAL ADVICE


Admitting Provider: Weiland (Hospitalist)

## 2020-09-16 NOTE — RADIOLOGY REPORT (SQ)
EXAM DESCRIPTION: 



XR CHEST 1 VIEW



COMPLETED DATE/TME:  09/16/2020 22:14



CLINICAL HISTORY: 



55 years, Female, sob, wheezing



COMPARISON:

7/7/2020 chest



NUMBER OF VIEWS:

1



TECHNIQUE:

Portable chest



LIMITATIONS:

None.



FINDINGS:



The heart size is normal. Osteopenia. Underlying emphysema. Minor

fibrotic changes in the right upper lobe.



IMPRESSION:



No acute cardiopulmonary process. Underlying emphysema with

fibrotic change as before

 



copyright 2011 [employer]- All Rights Reserved

## 2020-09-17 VITALS — SYSTOLIC BLOOD PRESSURE: 186 MMHG | DIASTOLIC BLOOD PRESSURE: 84 MMHG

## 2020-09-17 LAB
ANION GAP SERPL CALC-SCNC: 8 MMOL/L (ref 5–19)
ARTERIAL BLOOD H2CO3: 2.1 MMOL/L (ref 1.05–1.35)
ARTERIAL BLOOD HCO3: 38.6 MMOL/L (ref 20–24)
ARTERIAL BLOOD PCO2: 69.7 MMHG (ref 35–45)
ARTERIAL BLOOD PH: 7.36 (ref 7.35–7.45)
ARTERIAL BLOOD PO2: 73.7 MMHG (ref 80–100)
ARTERIAL BLOOD TOTAL CO2: 40.7 MMOL/L (ref 21–25)
BASE EXCESS BLDA CALC-SCNC: 10.5 MMOL/L
BUN SERPL-MCNC: 15 MG/DL (ref 7–20)
CALCIUM: 9.2 MG/DL (ref 8.4–10.2)
CHLORIDE SERPL-SCNC: 87 MMOL/L (ref 98–107)
CK MB SERPL-MCNC: 5.21 NG/ML (ref ?–4.55)
CO2 SERPL-SCNC: 33 MMOL/L (ref 22–30)
ERYTHROCYTE [DISTWIDTH] IN BLOOD BY AUTOMATED COUNT: 14 % (ref 11.5–14)
GLUCOSE SERPL-MCNC: 126 MG/DL (ref 75–110)
HCT VFR BLD CALC: 34.1 % (ref 36–47)
HGB BLD-MCNC: 11.4 G/DL (ref 12–15.5)
MCH RBC QN AUTO: 27.7 PG (ref 27–33.4)
MCHC RBC AUTO-ENTMCNC: 33.6 G/DL (ref 32–36)
MCV RBC AUTO: 83 FL (ref 80–97)
PLATELET # BLD: 261 10^3/UL (ref 150–450)
POTASSIUM SERPL-SCNC: 4.5 MMOL/L (ref 3.6–5)
RBC # BLD AUTO: 4.12 10^6/UL (ref 3.72–5.28)
SAO2 % BLDA: 93.7 % (ref 94–98)
TROPONIN I SERPL-MCNC: < 0.012 NG/ML
WBC # BLD AUTO: 6.5 10^3/UL (ref 4–10.5)

## 2020-09-17 RX ADMIN — METHYLPREDNISOLONE SODIUM SUCCINATE SCH MG: 40 INJECTION, POWDER, FOR SOLUTION INTRAMUSCULAR; INTRAVENOUS at 08:09

## 2020-09-17 RX ADMIN — METHYLPREDNISOLONE SODIUM SUCCINATE SCH: 40 INJECTION, POWDER, FOR SOLUTION INTRAMUSCULAR; INTRAVENOUS at 02:06

## 2020-09-17 NOTE — LEFT AGAINST MEDICAL ADVICE
Against Medical Advice


Admission Date/Time: 09/17/20 01:15





 Primary Care Provider: ULYSSES GRANT








Date of Patient Emigration: 09/17/20





- Diagnosis:


(1) Acute on chronic respiratory failure with hypoxia and hypercapnia


Is this a current diagnosis for this admission?: Yes   





(2) Bipolar 1 disorder


Is this a current diagnosis for this admission?: Yes   





(3) COPD exacerbation


Is this a current diagnosis for this admission?: Yes   





(4) Congestive heart failure


Is this a current diagnosis for this admission?: Yes   





(5) Hypertension


Is this a current diagnosis for this admission?: Yes   





(6) Tobacco use disorder, continuous


Is this a current diagnosis for this admission?: Yes   





- Summary:


Summary: 


Please see Admission and Progress Notes as well.





CICI KIRKPATRICK is a 55 F, who LEFT AGAINST MEDICAL ADVICE.





The Patient was admitted on 09/17/20 01:15.





Patient was admitted for acute on chronic respiratory failure with hypoxia and 

hypercapnia secondary to COPD and CHF exacerbations.


She was treated with supplemental oxygen, BiPAP, scheduled as needed nebulizer 

treatments, and steroid therapy.


Unfortunately, the patient elected to leave AGAINST MEDICAL ADVICE prior to 

transferring from the emergency department to her floor bed.

## 2020-09-17 NOTE — PDOC H&P
History of Present Illness


Admission Date/PCP: 


2020   01:11  


ULYSSES GRANT


Patient complains of: Dyspnea


History of Present Illness: 


CICI KIRKPATRICK is a 55 year old female who presented emergency room with a 

one day history of dyspnea.  She admits developing dyspnea early on the morning 

of 2020.  Her dyspnea is worsened with exertion or activity.  Her dyspnea 

persisted and gradually worsened over the course of the day becoming severe in 

the evening and resulting in her calling EMS to bring her to the hospital.  Her 

dyspnea was accompanied by wheezing and associated with an occasional minimally 

productive cough (clear sputum).  She denies other associated or accompanying 

signs and symptoms.  She admits numerous prior similar episodes the most recent 

being 2 weeks ago when she was hospitalized at [facility] for an exacerbation of her COPD.  He tried using her inhalation therapy 

at home with little or no relief.  She has not identified any additional 

aggravating or ameliorating factors for her dyspnea.  In the emergency room she 

was found to have a normal CBC and a chest x-ray which revealed no acute 

processes.  She required the use of intermittent BiPAP in order to maintain an 

adequate oxygen saturation.  She was subsequently admitted to the hospital on 

observation status for further evaluation and treatment.





Past Medical History


Cardiac Medical History: Reports: Congestive Heart Failure, Hypertension


   Denies: Atrial Fibrillation, Coronary Artery Disease, DVT, Myocardial 

Infarction, Hyperlipidema, Pulmonary Embolism


Pulmonary Medical History: Reports: Asthma, Bronchitis, Chronic Obstructive 

Pulmonary Disease (COPD), Pneumonia, Respiratory Failure - Chronic respiratory 

failure with hypoxia


EENT Medical History: 


   Denies: Cataracts, Ears - Hearing aids


Neurological Medical History: Reports: Seizures - Single seizure in the remote 

past


   Denies: Hemorrhagic CVA, Ischemic CVA


Endocrine Medical History: 


   Denies: Diabetes Mellitus Type 1, Diabetes Mellitus Type 2, Hyperthyroidism, 

Hypothyroidism, Obesity


Renal/ Medical History: 


   Denies: Chronic Kidney Disease, Nephrolithiasis


Malignancy Medical History: Reports: None


GI Medical History: 


   Denies: Cirrhosis, Crohn's Disease, Gastroesophageal Reflux Disease, 

Hepatitis, Peptic Ulcer Disease, Ulcerative Colitis


Musculoskeltal Medical History: Reports: Arthritis


   Denies: Gout


Skin Medical History: 


   Denies: Eczema, Psoriasis


Psychiatric Medical History: Reports: Bipolar Disorder, Depression, Tobacco 

Dependency


   Denies: Alcohol Dependency, Substance Abuse


Traumatic Medical History: Reports: None


Hematology: 


   Denies: Anemia, Bleeding Tendencies


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





Past Surgical History


Past Surgical History: Reports:  Section, Orthopedic Surgery - right 

knee





Social History


Information Source: Patient


Lives with: Spouse/Significant other


Smoking Status: Current Every Day Smoker


Electronic Cigarette use?: No


Frequency of Alcohol Use: None


Hx Recreational Drug Use: Yes


Drugs: Marijuana


Hx Prescription Drug Abuse: No





- Advance Directive


Resuscitation Status: Full Code


Surrogate healthcare decision maker:: 


Edmaninder Kirkpatrick





Family History


Family History: COPD, Hypertension.  denies: CAD, CVA, DM, Malignancy


Parental Family History Reviewed: Yes


Children Family History Reviewed: No


Sibling(s) Family History Reviewed.: Yes





Medication/Allergy


Home Medications: 








Aripiprazole [Abilify 5 mg Tablet] 5 mg PO DAILY 10/01/19 


Bupropion HCl [Wellbutrin Xl 300mg 24hr Tablet] 300 mg PO DAILY 10/01/19 


Citalopram Hydrobromide [Celexa 40 mg Tablet] 40 mg PO DAILY 10/01/19 


Risperidone [Risperdal] 3 mg PO Q12 10/01/19 


Azithromycin [Zithromax 250 mg Tablet] 250 mg PO MOWEFR@1000 20 


Benztropine Mesylate [Cogentin 1 mg Tablet] 1 tab PO BID 20 


Furosemide [Lasix 20 mg Tablet] 10 mg PO BID 20 


Albuterol Sulfate [Ventolin 0.083% Neb 2.5 mg/3 mL Ampul] 1 vial NEB Q6 #90 vial

20 


Budesonide [Pulmicort 90 mcg Flexhaler] 1 inh IH DAILY #2 inhaler 20 


Prednisone [Deltasone 20 mg Tablet] 20 mg PO BID 3 Days 20 


Tiotropium Bromide [Spiriva Respimat] 1 puff IH BID #2 inhaler 20 


Azithromycin [Zithromax 250 mg Tablet] 250 mg PO ASDIR PRN #6 tablet 20 


Prednisone [Deltasone 20 mg Tablet] 1 tab PO DAILY 4 Days #4 tablet 20 


Albuterol Sulfate [Proair HFA Inhalation Aerosol 8.5 gm MDI] 2 puff IH Q4HP PRN 

#1 20 








Allergies/Adverse Reactions: 


                                        





No Known Allergies Allergy (Verified 20 12:47)


   











Review of Systems


Constitutional: ABSENT: chills, fever(s)


Eyes: ABSENT: visual disturbances, other - Eye pain


Ears: ABSENT: hearing changes, other - Ear pain


Nose, Mouth, and Throat: ABSENT: headache(s), sore throat


Cardiovascular: PRESENT: dyspnea on exertion.  ABSENT: chest pain, palpitations


Respiratory: PRESENT: as per HPI, cough, dyspnea, sputum - Clear.  ABSENT: 

hemoptysis


Gastrointestinal: ABSENT: abdominal pain, constipation, diarrhea, nausea, 

vomiting


Genitourinary: ABSENT: dysuria, hematuria


Musculoskeletal: ABSENT: back pain, joint swelling


Integumentary: ABSENT: pruritus, rash


Neurological: ABSENT: confusion, convulsions, focal weakness, memory loss, 

syncope


Psychiatric: ABSENT: anxiety, depression


Endocrine: ABSENT: cold intolerance, heat intolerance


Hematologic/Lymphatic: ABSENT: easy bleeding, easy bruising


Allergic/Immunologic: ABSENT: seasonal rhinorrhea





Physical Exam


Vital Signs: 


                                        











Temp Pulse Resp BP Pulse Ox


 


 97.8 F      25 H  174/82 H  99 


 


 20 21:55     20 01:01  20 01:00  20 01:01








                                 Intake & Output











 09/15/20 09/16/20 09/17/20





 23:59 23:59 23:59


 


Weight  63.503 kg 











General appearance: PRESENT: cooperative, mild distress - Mild dyspnea


Head exam: PRESENT: atraumatic, normocephalic


Eye exam: PRESENT: conjunctiva pink.  ABSENT: conjunctival injection, scleral 

icterus


Ear exam: PRESENT: normal external ear exam.  ABSENT: bleeding, drainage


Mouth exam: PRESENT: dry mucosa, neck supple


Neck exam: ABSENT: thyromegaly, tracheal deviation


Respiratory exam: PRESENT: decreased breath sounds - Mildly decreased breath 

sounds noted throughout all fields, prolonged expiratory phas - Mildly prolonged

expiratory phase to auscultation in all fields, symmetrical, tachypnea, wheezes 

- Moderate expiratory wheezing present throughout all fields


Cardiovascular exam: PRESENT: RRR.  ABSENT: clicks, gallop, rubs


Pulses: PRESENT: normal radial pulses, normal dorsalis pedis pul


Vascular exam: PRESENT: normal capillary refill.  ABSENT: pallor


GI/Abdominal exam: PRESENT: normal bowel sounds, soft.  ABSENT: tenderness


Rectal exam: PRESENT: deferred


Extremities exam: ABSENT: joint swelling, pedal edema


Musculoskeletal exam: ABSENT: deformity, dislocation


Neurological exam: PRESENT: alert, oriented to person, oriented to place, 

oriented to time, oriented to situation, CN II-XII grossly intact.  ABSENT: 

motor sensory deficit


Psychiatric exam: PRESENT: appropriate affect, normal mood


Skin exam: PRESENT: dry, intact, warm.  ABSENT: jaundice, rash, urticaria





Results


Laboratory Results: 


                                        





                                 20 22:03 





                                 20 22:03 





                                        











  20





  22:03 22:03 23:03


 


WBC  9.5  


 


RBC  4.10  


 


Hgb  11.3 L  


 


Hct  34.0 L  


 


MCV  83  


 


MCH  27.7  


 


MCHC  33.4  


 


RDW  13.9  


 


Plt Count  285  


 


Seg Neutrophils %  72.8  


 


Sodium   121.5 L 


 


Potassium   3.9 


 


Chloride   79 L 


 


Carbon Dioxide   38 H 


 


Anion Gap   5 


 


BUN   16 


 


Creatinine   0.89 


 


Est GFR ( Amer)   > 60 


 


Glucose   101 


 


Calcium   8.7 


 


Total Bilirubin   0.4 


 


AST   22 


 


Alkaline Phosphatase   101 


 


Total Protein   6.0 L 


 


Albumin   3.8 


 


Urine Color    COLORLESS


 


Urine Appearance    CLEAR


 


Urine pH    7.0


 


Ur Specific Gravity    1.002


 


Urine Protein    NEGATIVE


 


Urine Glucose (UA)    NEGATIVE


 


Urine Ketones    NEGATIVE


 


Urine Blood    NEGATIVE


 


Urine Nitrite    NEGATIVE


 


Ur Leukocyte Esterase    NEGATIVE








                                        











  20





  22:03


 


Troponin I  < 0.012











Impressions: 


                                        





Chest X-Ray  20 22:14


IMPRESSION:


 


No acute cardiopulmonary process. Underlying emphysema with


fibrotic change as before


 


 


copyright  [employer]- All Rights Reserved


 














Assessment and Plan





- Diagnosis


(1) COPD exacerbation


Is this a current diagnosis for this admission?: Yes   





(2) Acute and chronic respiratory failure with hypoxia


Is this a current diagnosis for this admission?: Yes   





(3) Hyponatremia


Is this a current diagnosis for this admission?: Yes   





(4) Congestive heart failure


Qualifiers: 


   Heart failure type: unspecified   Heart failure chronicity: chronic   

Qualified Code(s): I50.9 - Heart failure, unspecified   


Is this a current diagnosis for this admission?: Yes   





(5) Hypertension


Qualifiers: 


   Hypertension type: essential hypertension   Qualified Code(s): I10 - 

Essential (primary) hypertension   


Is this a current diagnosis for this admission?: Yes   





(6) Bipolar 1 disorder


Is this a current diagnosis for this admission?: Yes   





(7) Tobacco use disorder, continuous


Is this a current diagnosis for this admission?: Yes   





- Plan Summary


Summary: 


Patient is admitted observation status on the medical floor with telemetry, 

where she received routine supportive and symptomatic cares.  She will be 

treated with an aggressive pulmonary toilet utilizing nebulized Xopenex, 

Pulmicort and Atrovent.  She will receive supplemental oxygen via nasal cannula 

or BiPAP as required to maintain adequate oxygen saturations.  She will complete

a burst dose protocol of intravenous Solu-Medrol with later conversion to oral s

teroids.  Smoking cessation is advised and counseled briefly at the bedside.  A 

nicotine replacement patch is available for the patient's use, if desired.  

Patient will continue on her usual medications, as appropriate, as soon as her 

medication list has been verified and reconciled.  Patient will be on a cardiac 

diet.  CBCs, arterial blood gases, metabolic profiles and additional laboratory 

and/or radiographic evaluations will be obtained as needed.





- Time


Time Spent with patient: Less than 15 minutes


Smoking Cessation Education: 3 to 10 minutes


Medications reviewed and adjusted accordingly: Yes


Anticipated Discharge Disposition: Home, Self Care


Anticipated Discharge Timeframe: within 36 hours





- Inpatient Certification


Based on my medical assessment, after consideration of the patient's 

comorbidities, presenting symptoms, or acuity I expect that the services needed 

warrant INPATIENT care.: No


I certify that my determination is in accordance with my understanding of 

Medicare's requirements for reasonable and necessary INPATIENT services [42 CFR 

412.3e].: No

## 2020-10-16 ENCOUNTER — HOSPITAL ENCOUNTER (EMERGENCY)
Dept: HOSPITAL 62 - ER | Age: 55
LOS: 1 days | Discharge: HOME | End: 2020-10-17
Payer: COMMERCIAL

## 2020-10-16 DIAGNOSIS — R51.9: ICD-10-CM

## 2020-10-16 DIAGNOSIS — J44.1: Primary | ICD-10-CM

## 2020-10-16 DIAGNOSIS — I11.0: ICD-10-CM

## 2020-10-16 DIAGNOSIS — Z99.81: ICD-10-CM

## 2020-10-16 DIAGNOSIS — R06.2: ICD-10-CM

## 2020-10-16 DIAGNOSIS — R06.82: ICD-10-CM

## 2020-10-16 DIAGNOSIS — I50.9: ICD-10-CM

## 2020-10-16 DIAGNOSIS — F17.200: ICD-10-CM

## 2020-10-16 DIAGNOSIS — R05: ICD-10-CM

## 2020-10-16 LAB
ADD MANUAL DIFF: NO
ALBUMIN SERPL-MCNC: 3.6 G/DL (ref 3.5–5)
ALP SERPL-CCNC: 92 U/L (ref 38–126)
ANION GAP SERPL CALC-SCNC: 4 MMOL/L (ref 5–19)
APPEARANCE UR: CLEAR
APTT PPP: (no result) S
AST SERPL-CCNC: 19 U/L (ref 14–36)
BASE EXCESS BLDV CALC-SCNC: 15.8 MMOL/L
BASOPHILS # BLD AUTO: 0 10^3/UL (ref 0–0.2)
BASOPHILS NFR BLD AUTO: 0.3 % (ref 0–2)
BILIRUB DIRECT SERPL-MCNC: 0.4 MG/DL (ref 0–0.4)
BILIRUB SERPL-MCNC: 0.5 MG/DL (ref 0.2–1.3)
BILIRUB UR QL STRIP: NEGATIVE
BUN SERPL-MCNC: 13 MG/DL (ref 7–20)
CALCIUM: 9.9 MG/DL (ref 8.4–10.2)
CHLORIDE SERPL-SCNC: 83 MMOL/L (ref 98–107)
CO2 SERPL-SCNC: 42 MMOL/L (ref 22–30)
EOSINOPHIL # BLD AUTO: 0.2 10^3/UL (ref 0–0.6)
EOSINOPHIL NFR BLD AUTO: 1.9 % (ref 0–6)
ERYTHROCYTE [DISTWIDTH] IN BLOOD BY AUTOMATED COUNT: 14 % (ref 11.5–14)
GLUCOSE SERPL-MCNC: 99 MG/DL (ref 75–110)
GLUCOSE UR STRIP-MCNC: NEGATIVE MG/DL
HCO3 BLDV-SCNC: 45.7 MMOL/L (ref 20–32)
HCT VFR BLD CALC: 34.8 % (ref 36–47)
HGB BLD-MCNC: 11.3 G/DL (ref 12–15.5)
KETONES UR STRIP-MCNC: NEGATIVE MG/DL
LYMPHOCYTES # BLD AUTO: 1.7 10^3/UL (ref 0.5–4.7)
LYMPHOCYTES NFR BLD AUTO: 19.1 % (ref 13–45)
MCH RBC QN AUTO: 27.8 PG (ref 27–33.4)
MCHC RBC AUTO-ENTMCNC: 32.5 G/DL (ref 32–36)
MCV RBC AUTO: 86 FL (ref 80–97)
MONOCYTES # BLD AUTO: 0.8 10^3/UL (ref 0.1–1.4)
MONOCYTES NFR BLD AUTO: 9.7 % (ref 3–13)
NEUTROPHILS # BLD AUTO: 6 10^3/UL (ref 1.7–8.2)
NEUTS SEG NFR BLD AUTO: 69 % (ref 42–78)
NITRITE UR QL STRIP: NEGATIVE
NT PRO BNP: 56 PG/ML (ref ?–125)
PCO2 BLDV: 90.4 MMHG (ref 35–63)
PH BLDV: 7.32 [PH] (ref 7.3–7.42)
PH UR STRIP: 8 [PH] (ref 5–9)
PLATELET # BLD: 386 10^3/UL (ref 150–450)
POTASSIUM SERPL-SCNC: 4.4 MMOL/L (ref 3.6–5)
PROT SERPL-MCNC: 5.7 G/DL (ref 6.3–8.2)
PROT UR STRIP-MCNC: NEGATIVE MG/DL
RBC # BLD AUTO: 4.08 10^6/UL (ref 3.72–5.28)
SP GR UR STRIP: 1
TOTAL CELLS COUNTED % (AUTO): 100 %
TROPONIN I SERPL-MCNC: < 0.012 NG/ML
UROBILINOGEN UR-MCNC: NEGATIVE MG/DL (ref ?–2)
WBC # BLD AUTO: 8.6 10^3/UL (ref 4–10.5)

## 2020-10-16 PROCEDURE — 36415 COLL VENOUS BLD VENIPUNCTURE: CPT

## 2020-10-16 PROCEDURE — 83880 ASSAY OF NATRIURETIC PEPTIDE: CPT

## 2020-10-16 PROCEDURE — 87077 CULTURE AEROBIC IDENTIFY: CPT

## 2020-10-16 PROCEDURE — 82803 BLOOD GASES ANY COMBINATION: CPT

## 2020-10-16 PROCEDURE — 93010 ELECTROCARDIOGRAM REPORT: CPT

## 2020-10-16 PROCEDURE — 80053 COMPREHEN METABOLIC PANEL: CPT

## 2020-10-16 PROCEDURE — 93005 ELECTROCARDIOGRAM TRACING: CPT

## 2020-10-16 PROCEDURE — 81001 URINALYSIS AUTO W/SCOPE: CPT

## 2020-10-16 PROCEDURE — 96366 THER/PROPH/DIAG IV INF ADDON: CPT

## 2020-10-16 PROCEDURE — 85025 COMPLETE CBC W/AUTO DIFF WBC: CPT

## 2020-10-16 PROCEDURE — 84484 ASSAY OF TROPONIN QUANT: CPT

## 2020-10-16 PROCEDURE — 99285 EMERGENCY DEPT VISIT HI MDM: CPT

## 2020-10-16 PROCEDURE — 71045 X-RAY EXAM CHEST 1 VIEW: CPT

## 2020-10-16 PROCEDURE — 83605 ASSAY OF LACTIC ACID: CPT

## 2020-10-16 PROCEDURE — 87040 BLOOD CULTURE FOR BACTERIA: CPT

## 2020-10-16 PROCEDURE — 94640 AIRWAY INHALATION TREATMENT: CPT

## 2020-10-16 PROCEDURE — 87186 SC STD MICRODIL/AGAR DIL: CPT

## 2020-10-16 PROCEDURE — 96365 THER/PROPH/DIAG IV INF INIT: CPT

## 2020-10-16 RX ADMIN — MAGNESIUM SULFATE IN DEXTROSE SCH MLS/HR: 10 INJECTION, SOLUTION INTRAVENOUS at 22:39

## 2020-10-16 NOTE — ER DOCUMENT REPORT
ED Respiratory Problem





- General


Chief Complaint: Respiratory Distress


Stated Complaint: RESPIRATORY


Primary Care Provider: 


JASPER BLACKWOOD FNP-C [Primary Care Provider] - 10/19/20


Notes: 


Patient is a 55-year-old female who is a current smoker, has a history of COPD 

and chronic respiratory failure, that comes emergency department for chief c

omplaint of wheezing, shortness of breath, cough with brownish sputum 

production.  She states she started having worsening shortness of breath since 

yesterday but she became very short of breath just prior to arrival with EMS, 

EMS noted tachypnea and wheezes, patient was given 2 DuoNeb's, 125 mg of Solu-

Medrol.  She states she started to develop a headache now but her breathing is 

significantly improved.  She denies fever, she states she had a recent negative 

COVID-19 test, she denies any other complaints.  She is on home oxygen at 2 L 

nasal cannula at all times.





TRAVEL OUTSIDE OF THE [place] IN LAST 30 DAYS: No





- Related Data


Allergies/Adverse Reactions: 


                                        





No Known Allergies Allergy (Verified 10/17/20 02:07)


   











Past Medical History





- General


Information source: Patient





- Social History


Smoking Status: Current Every Day Smoker


Smoking Education Provided: Yes - <3 min


Frequency of alcohol use: None


Drug Abuse: None


Lives with: Family


Family History: Reviewed & Not Pertinent, COPD, Hypertension.  denies: CAD, CVA,

DM, Malignancy





- Past Medical History


Cardiac Medical History: Reports: Hx Congestive Heart Failure, Hx Hypertension


   Denies: Hx Atrial Fibrillation, Hx Coronary Artery Disease, Hx DVT, Hx Heart 

Attack, Hx Hypercholesterolemia, Hx Pulmonary Embolism


Pulmonary Medical History: Reports: Hx Asthma, Hx Bronchitis, Hx COPD, Hx 

Pneumonia, Hx Respiratory Failure - Chronic respiratory failure with hypoxia


Neurological Medical History: Reports: Hx Seizures - Single seizure in the 

remote past.  Denies: Hx Cerebrovascular Accident


Endocrine Medical History: Denies: Hx Diabetes Mellitus Type 1, Hx Diabetes 

Mellitus Type 2, Hx Hyperthyroidism, Hx Hypothyroidism


Renal/ Medical History: Reports: Hx Kidney Stones


GI Medical History: Denies: Hx Cirrhosis, Hx Crohn's Disease, Hx 

Gastroesophageal Reflux Disease, Hx Hepatitis, Hx Ulcerative Colitis


Musculoskeletal Medical History: Reports Hx Arthritis, Denies Hx Gout


Skin Medical History: Denies Hx Eczema, Denies Hx Psoriasis


Psychiatric Medical History: Reports: Hx Bipolar Disorder, Hx Depression


Infectious Medical History: Reports: Hx MRSA.  Denies: Hx Hepatitis


Past Surgical History: Reports: Hx  Section, Hx Orthopedic Surgery - 

right knee.  Denies: Hx Hysterectomy, Hx Pacemaker





- Immunizations


Hx Diphtheria, Pertussis, Tetanus Vaccination: No


Hx Pneumococcal Vaccination: 13





Review of Systems





- Review of Systems


Constitutional: No symptoms reported


EENT: No symptoms reported


Cardiovascular: No symptoms reported


Respiratory: See HPI


Gastrointestinal: No symptoms reported


Genitourinary: No symptoms reported


Female Genitourinary: No symptoms reported


Musculoskeletal: No symptoms reported


Skin: No symptoms reported


Hematologic/Lymphatic: No symptoms reported


Neurological/Psychological: No symptoms reported





Physical Exam





- Vital signs


Vitals: 


                                        











Resp Pulse Ox


 


 24 H  100 


 


 10/16/20 20:04  10/16/20 20:04














- Notes


Notes: 





GENERAL: Alert, interactive, no signs of distress


HEAD: Normocephalic, atraumatic.


EYES: Pupils equal, round, and reactive to light. Extraocular movements intact.


ENT: Oral mucosa moist, tongue midline. Oropharynx unremarkable. Airway patent. 


LUNGS: Decreased breath sounds bilaterally.  Expiratory wheezes throughout 

noted.  No rales or rhonchi.  No tachypnea or respiratory distress.


HEART: Regular rate and rhythm. No murmur


ABDOMEN: Soft, non-tender. Non-distended. 


EXTREMITIES: Moves all 4 extremities spontaneously. No edema, normal radial and 

dorsalis pedis pulses bilaterally. No cyanosis.


BACK: no cervical, thoracic, lumbar midline tenderness. No saddle anesthesia, 

normal distal neurovascular exam. Moves all extremities in full range of motion.


NEUROLOGICAL: Alert and oriented x3. Normal speech. Cranial nerves II through 

XII grossly intact. Strength 5/5 in all extremities. 


PSYCH: Normal affect, normal mood.


SKIN: Warm, dry, normal turgor. No rashes or lesions noted.





Course





- Re-evaluation


Re-evalutation: 


On my initial examination patient did have some expiratory wheezing but no 

tachypnea, labored breathing, and she is not hypoxic on her home oxygen levels. 

She is completing her treatments with final DuoNeb and magnesium, she will be 

evaluated again after her work-up.





CBC unremarkable, troponin is not elevated.  BNP is not elevated.  Chemistry 

shows bicarbonate of greater than 40, sodium of 129.  Otherwise unremarkable.  

Venous blood gas shows very elevated CO2 at 90 with elevated bicarbonate at 45, 

but normal pH.  On reevaluation wheezing is completely resolved, patient states 

she feels great, patient is asking for an inhaler refill and to discharge home. 

Chest x-ray shows possible pneumonia.  Patient just had a negative COVID-19 

test.  I discussed with Dr. Cm, he recommends ABG be performed because of 

patient's improvement in appearance.  In addition to this on previous records 

VBG always appears very elevated and ABG has been much better by comparison with

this particular patient.  If ABG is unremarkable patient can be sent home with 

antibiotics, steroids, albuterol, follow-up, return precautions.





ABG shows CO2 at 67 which is approximately patient is a good baseline.  No 

hypoxia on patient's 2 L.  On reevaluation patient continues to to appear well, 

have no wheezing, and request discharge.  Patient was discharged antibiotics, 

steroids, albuterol, and I discussed close follow-up, smoking cessation, and 

strict return precautions.  Patient states appreciation and agreement.  Stable 

and well-appearing at time of discharge.





- Vital Signs


Vital signs: 


                                        











Temp Pulse Resp BP Pulse Ox


 


 98.6 F      27 H  157/89 H  98 


 


 10/17/20 02:21     10/17/20 02:21  10/17/20 02:21  10/17/20 02:21














- Laboratory


Result Diagrams: 


                                 10/16/20 20:07





                                 10/16/20 20:07


Laboratory results interpreted by me: 


                                        











  10/16/20 10/16/20 10/16/20





  20:07 20:07 20:07


 


Hgb  11.3 L  


 


Hct  34.8 L  


 


Carbonic Acid   


 


ABG pCO2   


 


ABG pO2   


 


ABG HCO3   


 


ABG Total CO2   


 


ABG O2 Saturation   


 


VBG pCO2    90.4 H*


 


VBG HCO3    45.7 H


 


Sodium   129.4 L 


 


Chloride   83 L 


 


Carbon Dioxide   42 H* 


 


Anion Gap   4 L 


 


Total Protein   5.7 L 














  10/17/20





  01:40


 


Hgb 


 


Hct 


 


Carbonic Acid  2.03 H


 


ABG pCO2  67.3 H


 


ABG pO2  160.6 H


 


ABG HCO3  40.6 H


 


ABG Total CO2  42.6 H


 


ABG O2 Saturation  99.0 H


 


VBG pCO2 


 


VBG HCO3 


 


Sodium 


 


Chloride 


 


Carbon Dioxide 


 


Anion Gap 


 


Total Protein 














- EKG Interpretation by Me


Additional EKG results interpreted by me: 


EKG shows sinus rhythm at a rate of 85, QTC of 428, no T wave inversions or ST 

segment changes in consecutive leads.  There is some artifact and patient moveme

nt.





Discharge





- Discharge


Clinical Impression: 


 Wheezing, Cough, COPD exacerbation, Tobacco use disorder, continuous





Condition: Stable


Disposition: HOME, SELF-CARE


Additional Instructions: 


Your evaluation is consistent with a COPD exacerbation and possible early 

developing pneumonia.  We are covering you for this.  Use your albuterol as 

prescribed, take the antibiotics as prescribed, take the prednisone as 

prescribed.  Follow-up with primary care.  Stop smoking.





Come back if you worsen including developing fever, difficulty breathing, chest 

pain, or any other concerning or worsening symptoms.


Prescriptions: 


Prednisone [Deltasone 20 mg Tablet] 3 tab PO DAILY 5 Days #15 tablet


Albuterol Sulfate [Proair HFA Inhalation Aerosol 8.5 gm MDI] 2 puff IH Q4H PRN 

#1 mdi


 PRN Reason: 


Doxycycline Hyclate [Vibramycin 100 mg Tablet] 100 mg PO BID 7 Days #14 tablet


Forms:  Smoking Cessation Education


Referrals: 


JASPER BLACKWOOD FNP-C [Primary Care Provider] - 10/19/20

## 2020-10-16 NOTE — RADIOLOGY REPORT (SQ)
EXAM DESCRIPTION: 



XR CHEST 1 VIEW



COMPLETED DATE/TME:  10/16/2020 20:09



CLINICAL HISTORY: 



55 years, Female, shortness of breath



COMPARISON:

September 16, 2020



NUMBER OF VIEWS:

1



TECHNIQUE:

Portable AP upright view the chest was obtained at 8:26 PM.



LIMITATIONS:

Overexposed lung fields



FINDINGS:



Heart size is normal.  There is subtle asymmetric increased

interstitial opacity within the right mid and lower lung zones,

nonspecific.  No dense airspace consolidation.  No pleural

effusion or pneumothorax.  There is an old left humeral neck

fracture and there is chronic elevation of the lateral right

clavicle.



IMPRESSION:



Subtle interstitial opacities right mid and lower lung zones

which could be on the basis of edema, inflammation, or

infiltration.  Clinical correlation is recommended.

 



copyright 2011 [employer]- All Rights Reserved

## 2020-10-17 VITALS — SYSTOLIC BLOOD PRESSURE: 157 MMHG | DIASTOLIC BLOOD PRESSURE: 89 MMHG

## 2020-10-17 LAB
ARTERIAL BLOOD FIO2: (no result)
ARTERIAL BLOOD H2CO3: 2.03 MMOL/L (ref 1.05–1.35)
ARTERIAL BLOOD HCO3: 40.6 MMOL/L (ref 20–24)
ARTERIAL BLOOD PCO2: 67.3 MMHG (ref 35–45)
ARTERIAL BLOOD PH: 7.4 (ref 7.35–7.45)
ARTERIAL BLOOD PO2: 160.6 MMHG (ref 80–100)
ARTERIAL BLOOD TOTAL CO2: 42.6 MMOL/L (ref 21–25)
BASE EXCESS BLDA CALC-SCNC: 13 MMOL/L
SAO2 % BLDA: 99 % (ref 94–98)

## 2020-10-17 RX ADMIN — MAGNESIUM SULFATE IN DEXTROSE SCH MLS/HR: 10 INJECTION, SOLUTION INTRAVENOUS at 00:40

## 2020-10-17 NOTE — EKG REPORT
SEVERITY:- ABNORMAL ECG -

SINUS RHYTHM

INCOMPLETE RIGHT BUNDLE BRANCH BLOCK

:

Confirmed by: Tiffanie Mcmillan 17-Oct-2020 09:40:06

## 2020-11-24 ENCOUNTER — HOSPITAL ENCOUNTER (OUTPATIENT)
Dept: HOSPITAL 62 - ER | Age: 55
Setting detail: OBSERVATION
LOS: 1 days | Discharge: HOME | End: 2020-11-25
Attending: INTERNAL MEDICINE | Admitting: INTERNAL MEDICINE
Payer: MEDICARE

## 2020-11-24 DIAGNOSIS — R42: ICD-10-CM

## 2020-11-24 DIAGNOSIS — Z79.899: ICD-10-CM

## 2020-11-24 DIAGNOSIS — J44.1: Primary | ICD-10-CM

## 2020-11-24 DIAGNOSIS — E87.1: ICD-10-CM

## 2020-11-24 DIAGNOSIS — R05: ICD-10-CM

## 2020-11-24 DIAGNOSIS — Z99.81: ICD-10-CM

## 2020-11-24 DIAGNOSIS — Z20.828: ICD-10-CM

## 2020-11-24 DIAGNOSIS — F31.9: ICD-10-CM

## 2020-11-24 DIAGNOSIS — I10: ICD-10-CM

## 2020-11-24 DIAGNOSIS — J96.12: ICD-10-CM

## 2020-11-24 DIAGNOSIS — F17.210: ICD-10-CM

## 2020-11-24 LAB
ADD MANUAL DIFF: NO
ALBUMIN SERPL-MCNC: 3.5 G/DL (ref 3.5–5)
ALP SERPL-CCNC: 86 U/L (ref 38–126)
ANION GAP SERPL CALC-SCNC: 6 MMOL/L (ref 5–19)
APPEARANCE UR: CLEAR
APTT PPP: COLORLESS S
ARTERIAL BLOOD FIO2: (no result)
ARTERIAL BLOOD H2CO3: 2.26 MMOL/L (ref 1.05–1.35)
ARTERIAL BLOOD HCO3: 37.7 MMOL/L (ref 20–24)
ARTERIAL BLOOD PCO2: 75.1 MMHG (ref 35–45)
ARTERIAL BLOOD PH: 7.32 (ref 7.35–7.45)
ARTERIAL BLOOD PO2: 69.7 MMHG (ref 80–100)
ARTERIAL BLOOD TOTAL CO2: 40 MMOL/L (ref 21–25)
AST SERPL-CCNC: 21 U/L (ref 14–36)
BASE EXCESS BLDA CALC-SCNC: 8.7 MMOL/L
BASOPHILS # BLD AUTO: 0 10^3/UL (ref 0–0.2)
BASOPHILS NFR BLD AUTO: 0.5 % (ref 0–2)
BILIRUB DIRECT SERPL-MCNC: 0.2 MG/DL (ref 0–0.4)
BILIRUB SERPL-MCNC: 0.5 MG/DL (ref 0.2–1.3)
BILIRUB UR QL STRIP: NEGATIVE
BUN SERPL-MCNC: 10 MG/DL (ref 7–20)
CALCIUM: 9.2 MG/DL (ref 8.4–10.2)
CHLORIDE SERPL-SCNC: 85 MMOL/L (ref 98–107)
CO2 SERPL-SCNC: 39 MMOL/L (ref 22–30)
EOSINOPHIL # BLD AUTO: 0.1 10^3/UL (ref 0–0.6)
EOSINOPHIL NFR BLD AUTO: 1.1 % (ref 0–6)
ERYTHROCYTE [DISTWIDTH] IN BLOOD BY AUTOMATED COUNT: 14.5 % (ref 11.5–14)
GLUCOSE SERPL-MCNC: 87 MG/DL (ref 75–110)
GLUCOSE UR STRIP-MCNC: NEGATIVE MG/DL
HCT VFR BLD CALC: 31.6 % (ref 36–47)
HGB BLD-MCNC: 10.2 G/DL (ref 12–15.5)
KETONES UR STRIP-MCNC: NEGATIVE MG/DL
LYMPHOCYTES # BLD AUTO: 1.2 10^3/UL (ref 0.5–4.7)
LYMPHOCYTES NFR BLD AUTO: 20.9 % (ref 13–45)
MCH RBC QN AUTO: 27.4 PG (ref 27–33.4)
MCHC RBC AUTO-ENTMCNC: 32.3 G/DL (ref 32–36)
MCV RBC AUTO: 85 FL (ref 80–97)
MONOCYTES # BLD AUTO: 0.6 10^3/UL (ref 0.1–1.4)
MONOCYTES NFR BLD AUTO: 9.5 % (ref 3–13)
NEUTROPHILS # BLD AUTO: 3.9 10^3/UL (ref 1.7–8.2)
NEUTS SEG NFR BLD AUTO: 68 % (ref 42–78)
NITRITE UR QL STRIP: NEGATIVE
PH UR STRIP: 8 [PH] (ref 5–9)
PLATELET # BLD: 217 10^3/UL (ref 150–450)
POTASSIUM SERPL-SCNC: 4.1 MMOL/L (ref 3.6–5)
PROT SERPL-MCNC: 5.7 G/DL (ref 6.3–8.2)
PROT UR STRIP-MCNC: NEGATIVE MG/DL
RBC # BLD AUTO: 3.72 10^6/UL (ref 3.72–5.28)
SAO2 % BLDA: 91.8 % (ref 94–98)
SP GR UR STRIP: 1
TOTAL CELLS COUNTED % (AUTO): 100 %
UROBILINOGEN UR-MCNC: NEGATIVE MG/DL (ref ?–2)
WBC # BLD AUTO: 5.8 10^3/UL (ref 4–10.5)

## 2020-11-24 PROCEDURE — 99285 EMERGENCY DEPT VISIT HI MDM: CPT

## 2020-11-24 PROCEDURE — G0378 HOSPITAL OBSERVATION PER HR: HCPCS

## 2020-11-24 PROCEDURE — 96361 HYDRATE IV INFUSION ADD-ON: CPT

## 2020-11-24 PROCEDURE — 93005 ELECTROCARDIOGRAM TRACING: CPT

## 2020-11-24 PROCEDURE — 82803 BLOOD GASES ANY COMBINATION: CPT

## 2020-11-24 PROCEDURE — 93010 ELECTROCARDIOGRAM REPORT: CPT

## 2020-11-24 PROCEDURE — 96374 THER/PROPH/DIAG INJ IV PUSH: CPT

## 2020-11-24 PROCEDURE — 81001 URINALYSIS AUTO W/SCOPE: CPT

## 2020-11-24 PROCEDURE — G0008 ADMIN INFLUENZA VIRUS VAC: HCPCS

## 2020-11-24 PROCEDURE — 0241U: CPT

## 2020-11-24 PROCEDURE — 85025 COMPLETE CBC W/AUTO DIFF WBC: CPT

## 2020-11-24 PROCEDURE — C9803 HOPD COVID-19 SPEC COLLECT: HCPCS

## 2020-11-24 PROCEDURE — 80053 COMPREHEN METABOLIC PANEL: CPT

## 2020-11-24 PROCEDURE — 84484 ASSAY OF TROPONIN QUANT: CPT

## 2020-11-24 PROCEDURE — 36415 COLL VENOUS BLD VENIPUNCTURE: CPT

## 2020-11-24 PROCEDURE — 94640 AIRWAY INHALATION TREATMENT: CPT

## 2020-11-24 PROCEDURE — 83880 ASSAY OF NATRIURETIC PEPTIDE: CPT

## 2020-11-24 PROCEDURE — 90686 IIV4 VACC NO PRSV 0.5 ML IM: CPT

## 2020-11-24 PROCEDURE — 71045 X-RAY EXAM CHEST 1 VIEW: CPT

## 2020-11-24 PROCEDURE — 87635 SARS-COV-2 COVID-19 AMP PRB: CPT

## 2020-11-24 PROCEDURE — 90471 IMMUNIZATION ADMIN: CPT

## 2020-11-24 PROCEDURE — 87040 BLOOD CULTURE FOR BACTERIA: CPT

## 2020-11-24 RX ADMIN — ENOXAPARIN SODIUM SCH MG: 40 INJECTION SUBCUTANEOUS at 21:20

## 2020-11-24 RX ADMIN — RISPERIDONE SCH MG: 1 TABLET ORAL at 23:17

## 2020-11-24 RX ADMIN — Medication SCH: at 21:13

## 2020-11-24 RX ADMIN — BUPROPION HYDROCHLORIDE SCH MG: 75 TABLET, FILM COATED ORAL at 23:17

## 2020-11-24 NOTE — RADIOLOGY REPORT (SQ)
EXAM DESCRIPTION:  CHEST SINGLE VIEW



IMAGES COMPLETED DATE/TIME:  11/24/2020 1:38 pm



REASON FOR STUDY:  shorntess of breath



COMPARISON:  10/16/2020



EXAM PARAMETERS:  NUMBER OF VIEWS: One view.

TECHNIQUE: Single frontal radiographic view of the chest acquired.

RADIATION DOSE: NA

LIMITATIONS: None.



FINDINGS:  LUNGS AND PLEURA: Persistent appearance of subtle increased interstitial markings at the r
ight lung base, not significantly progressed in the study interval.  No new focal consolidation, pleu
ral effusion, or pneumothorax.

MEDIASTINUM AND HILAR STRUCTURES: No masses.  Contour normal.

HEART AND VASCULAR STRUCTURES: Heart normal in size.  Normal vasculature.

BONES: No acute findings.

HARDWARE: None in the chest.

OTHER: No other significant finding.



IMPRESSION:  Stable radiographic appearance of the chest demonstrating minimally increased interstiti
al markings at the right lung base.



TECHNICAL DOCUMENTATION:  JOB ID:  [id number]

 2011 [employer]- All Rights Reserved



Reading location - IP/workstation name: [email or web address]

## 2020-11-24 NOTE — ER DOCUMENT REPORT
ED Respiratory Problem





- General


Chief Complaint: Breathing Difficulty


Stated Complaint: SHORTNESS OF BREATH


Time Seen by Provider: 20 14:42


TRAVEL OUTSIDE OF THE [place] IN LAST 30 DAYS: No





- HPI


Notes: 





Patient is a 55-year-old female with a past medical history of COPD on 3 L of 

chronic home O2 who presents with shortness of breath.  Patient states that 

symptoms began today.  She states she smoked a cigarette and she always gets 

shortness of breath after smoking a cigarette.  Patient called EMS and was given

Solu-Medrol, magnesium, breathing treatments.  She states she feels much better.

 Patient denies any chest pain.  She has no nausea or vomiting.  She has a 

headache which is not new.  No fevers or chills.  She states she has a chronic 

cough productive of sputum.  She was on antibiotics about 1 month ago for 

pneumonia and states that that has resolved.





- Related Data


Allergies/Adverse Reactions: 


                                        





No Known Allergies Allergy (Verified 10/17/20 02:07)


   











Past Medical History





- General


Information source: Patient





- Social History


Smoking Status: Unknown if Ever Smoked


Frequency of alcohol use: None


Drug Abuse: None


Family History: Reviewed & Not Pertinent, COPD, Hypertension.  denies: CAD, CVA,

DM, Malignancy





- Past Medical History


Cardiac Medical History: Reports: Hx Congestive Heart Failure, Hx Hypertension


   Denies: Hx Atrial Fibrillation, Hx Coronary Artery Disease, Hx DVT, Hx Heart 

Attack, Hx Hypercholesterolemia, Hx Pulmonary Embolism


Pulmonary Medical History: Reports: Hx Asthma, Hx Bronchitis, Hx COPD, Hx 

Pneumonia, Hx Respiratory Failure - Chronic respiratory failure with hypoxia


Neurological Medical History: Reports: Hx Seizures - Single seizure in the 

remote past.  Denies: Hx Cerebrovascular Accident


Endocrine Medical History: Denies: Hx Diabetes Mellitus Type 1, Hx Diabetes 

Mellitus Type 2, Hx Hyperthyroidism, Hx Hypothyroidism


Renal/ Medical History: Reports: Hx Kidney Stones


GI Medical History: Denies: Hx Cirrhosis, Hx Crohn's Disease, Hx 

Gastroesophageal Reflux Disease, Hx Hepatitis, Hx Ulcerative Colitis


Musculoskeletal Medical History: Reports Hx Arthritis, Denies Hx Gout


Skin Medical History: Denies Hx Eczema, Denies Hx Psoriasis


Psychiatric Medical History: Reports: Hx Bipolar Disorder, Hx Depression


Infectious Medical History: Reports: Hx MRSA.  Denies: Hx Hepatitis


Past Surgical History: Reports: Hx  Section, Hx Orthopedic Surgery - 

right knee.  Denies: Hx Hysterectomy, Hx Pacemaker





- Immunizations


Hx Diphtheria, Pertussis, Tetanus Vaccination: No


Hx Pneumococcal Vaccination: 13





Review of Systems





- Review of Systems


Notes: 





CONSTITUTIONAL:  No fever, fatigue or weight loss.  


SKIN:  No rash.  


HENT:  No congestion, ear pain, or sore throat.  


CARDIOVASCULAR:  No chest pain or edema.


RESPIRATORY: Positive for chronic cough.  Positive for wheezing and shortness of

breath.


GASTROINTESTINAL:  No abdominal pain, nausea, vomiting, bloody stools or 

diarrhea.   


MUSCULOSKELETAL:  No joint pain or swelling.   


NEUROLOGIC:  No seizures. No headache, focal weakness or sensory changes. 


HEMATOLOGIC:  No unusual bruising or bleeding.  


PSYCHIATRIC:  No depression or anxiety.





Physical Exam





- Vital signs


Vitals: 


                                        











Resp Pulse Ox


 


 24 H  95 


 


 20 12:57  20 12:57














- General


General appearance: Appears well


Notes: 





VITAL SIGNS: Within normal limits.  On normal 3 L O2


GENERAL:  No acute distress, non-toxic appearance.  


HEAD:  Normal with no signs of head trauma.


EYES:  EOMI, conjunctiva normal, no discharge.  


EARS:  Hearing grossly intact.


NOSE: Normal. 


NECK:  Normal range of motion, no tenderness, supple, no lymphadenopathy, No 

adenopathy, no JVD.   


CHEST: Diffuse wheezing throughout lung fields.


CARDIAC:  Regular rate and rhythm.  S1 and S2, without murmurs, gallops, or 

rubs.


VASCULAR:  No Edema.


ABDOMEN: Normal and soft with no tenderness, no masses or pulsatile masses.


GENITOURINARY: Normal, No tenderness


MUSCULOSKELETAL:  Good range of motion of all major joints. Extremities without 

clubbing, cyanosis or edema.  


NEUROLOGICAL:  Alert and oriented x 3.  No focal sensory or strength deficits.  

Speech normal.  Follows commands appropriately.


PSYCHIATRIC:  Normal Affect, judgement and mood.


SKIN:  Normal appearance with no rashes or lesions.





Course





- Re-evaluation


Re-evalutation: 





20 18:15


Patient is on her normal 3 L.  She states she feels much better.  She was 

slightly tachycardic initially likely from the breathing treatments.  She is now

at a normal heart rate.  She is 98%.  We will do an ambulation trial and an ABG 

as she does have a little bit of high CO2 on her BMP.  Likely patient will be 

discharged home.  She has residual right-sided pneumonia on her x-ray which 

could be from the previous pneumonia that was already treated.  Patient's ABG 

showed a high CO2 with mild acidotic findings.  I did order BiPAP.  I discussed 

with hospitalist for admission as patient also became winded when walking.  And 

is agreeable to the plan for admission.


20 22:09





20 14:30








- Vital Signs


Vital signs: 


                                        











Temp Pulse Resp BP Pulse Ox


 


 97.8 F   104 H  19   131/74 H  96 


 


 20 12:17  20 12:17  20 12:17  20 12:17  20 12:17














- Laboratory


Result Diagrams: 


                                 20 12:56





                                 20 12:56


Laboratory results interpreted by me: 


                                        











  20





  12:56 12:56 18:12


 


Hgb  10.2 L  


 


Hct  31.6 L  


 


RDW  14.5 H  


 


Carbonic Acid    2.26 H


 


ABG pH    7.32 L


 


ABG pCO2    75.1 H*


 


ABG pO2    69.7 L


 


ABG HCO3    37.7 H


 


ABG Total CO2    40.0 H


 


ABG O2 Saturation    91.8 L


 


Sodium   130.2 L 


 


Chloride   85 L 


 


Carbon Dioxide   39 H 


 


Total Protein   5.7 L 














- Diagnostic Test


Radiology reviewed: Image reviewed, Reports reviewed





Discharge





- Discharge


Clinical Impression: 


 COPD exacerbation





Acute and chronic respiratory failure (acute-on-chronic)


Qualifiers:


 Respiratory failure complication: hypoxia and hypercapnia Qualified Code(s): 

J96.21 - Acute and chronic respiratory failure with hypoxia





Condition: Stable


Disposition: ADMITTED AS OBSERVATION


Unit Admitted: Medical Floor

## 2020-11-24 NOTE — ER DOCUMENT REPORT
ED Medical Screen (RME)





- General


Chief Complaint: Breathing Difficulty


Stated Complaint: SHORTNESS OF BREATH


Time Seen by Provider: 20 14:42


Primary Care Provider: 


JASPER BLACKWOOD FNP-C [Primary Care Provider] - Follow up as needed


TRAVEL OUTSIDE OF THE [place] IN LAST 30 DAYS: No





- HPI


Notes: 





20 15:21


53-year-old female history of COPD, chronic respiratory failure, hypertension, 

heart failure presents to the emergency room via EMS for shortness of breath, 

difficulty and that started today.  Patient reports she had chest pain roughly 

an hour ago that lasted for a few seconds and went away, denies any radiation of

pain.  Just reports today she started with nausea and diarrhea.  She was given 

in the ambulance 2 mg of magnesium, 125 Solu-Medrol and 8 mg of Zofran she 

stated did help some.  She states she is been tested 3 times for Covid which has

been negative, last one was a couple weeks ago.  Patient states she overall does

not feel very well today.  Patient does wear 2 L nasal cannula at all times








I have greeted and performed a rapid initial assessment of this patient.  A 

comprehensive ED assessment and evaluation of the patient, analysis of test r

esults and completion of the medical decision making process will be conducted 

by additional ED providers.





PHYSICAL EXAMINATION:





GENERAL: Chronically ill malnourished and in no acute distress.





HEAD: Atraumatic, normocephalic.





EYES: Pupils equal round extraocular movements intact,  conjunctiva are normal.





NECK: Normal range of motion





CV: s1, s2 regular 





LUNGS: Wheezing heard throughout





- Related Data


Allergies/Adverse Reactions: 


                                        





No Known Allergies Allergy (Verified 10/17/20 02:07)


   











Past Medical History





- Social History


Frequency of alcohol use: None


Drug Abuse: None





- Past Medical History


Cardiac Medical History: Reports: Hx Congestive Heart Failure, Hx Hypertension


   Denies: Hx Atrial Fibrillation, Hx Coronary Artery Disease, Hx DVT, Hx Heart 

Attack, Hx Hypercholesterolemia, Hx Pulmonary Embolism


Pulmonary Medical History: Reports: Hx Asthma, Hx Bronchitis, Hx COPD, Hx 

Pneumonia, Hx Respiratory Failure - Chronic respiratory failure with hypoxia


Neurological Medical History: Reports: Hx Seizures - Single seizure in the 

remote past.  Denies: Hx Cerebrovascular Accident


Endocrine Medical History: Denies: Hx Diabetes Mellitus Type 1, Hx Diabetes 

Mellitus Type 2, Hx Hyperthyroidism, Hx Hypothyroidism


Renal/ Medical History: Reports: Hx Kidney Stones


GI Medical History: Denies: Hx Cirrhosis, Hx Crohn's Disease, Hx 

Gastroesophageal Reflux Disease, Hx Hepatitis, Hx Ulcerative Colitis


Musculoskeltal Medical History: Reports Hx Arthritis, Denies Hx Gout


Skin Medical History: Denies Hx Eczema, Denies Hx Psoriasis


Psychiatric Medical History: Reports: Hx Bipolar Disorder, Hx Depression


Infectious Medical History: Reports: Hx MRSA.  Denies: Hx Hepatitis


Past Surgical History: Reports: Hx  Section, Hx Orthopedic Surgery - 

right knee.  Denies: Hx Hysterectomy, Hx Pacemaker





- Immunizations


Hx Diphtheria, Pertussis, Tetanus Vaccination: No





Physical Exam





- Vital signs


Vitals: 


                                        











Resp Pulse Ox


 


 24 H  95 


 


 20 12:57  20 12:57














Course





- Vital Signs


Vital signs: 


                                        











Temp Pulse Resp BP Pulse Ox


 


 98.4 F      20      96 


 


 20 13:06     20 13:06     20 13:23














- Laboratory


Result Diagrams: 


                                 20 12:56





                                 20 12:56


Laboratory results interpreted by me: 


                                        











  20





  12:56 12:56


 


Hgb  10.2 L 


 


Hct  31.6 L 


 


RDW  14.5 H 


 


Sodium   130.2 L


 


Chloride   85 L


 


Carbon Dioxide   39 H


 


Total Protein   5.7 L














Doctor's Discharge





- Discharge


Referrals: 


JASPER BLACKWOOD FNP-C [Primary Care Provider] - Follow up as needed

## 2020-11-24 NOTE — EKG REPORT
SEVERITY:- ABNORMAL ECG -

SINUS RHYTHM

INCOMPLETE RIGHT BUNDLE BRANCH BLOCK

:

Confirmed by: Damian Morales MD 24-Nov-2020 17:49:58

## 2020-11-24 NOTE — PDOC H&P
History of Present Illness


Admission Date/PCP: 


  





  ULYSSES GRANT





Patient complains of: Shortness of breath


History of Present Illness: 


CICI KIRKPATRICK is a 55 year old female with history of COPD on 3 L nasal 

cannula, hypertension and tobacco abuse, who presents to the hospital with 

complaint of acute onset shortness of breath which started earlier today.  The 

episode was incited by smoking cigarettes.  She began to have worsening cough.  

She tried to write it out but no shortness of breath became more severe to the 

point where she felt like she was about to pass out.  He subsequently called 

EMS.  On arrival of the EMS patient was noted to be hypoxic at 86% on 3 L nasal 

cannula and was given steroids, breathing treatments as well as magnesium in the

field.  She was also tested for COVID-19 the rapid antigen test en route which 

was negative.  Patient denies any chest pain fever chills or any sick contacts. 

She uses her nebulizer machine at home.  Also reports recent treatment with 

antibiotics for pneumonia.








Past Medical History


Cardiac Medical History: Reports: Hypertension


   Denies: Atrial Fibrillation, Coronary Artery Disease, DVT, Myocardial 

Infarction, Hyperlipidema, Pulmonary Embolism


Pulmonary Medical History: Reports: Bronchitis, Chronic Obstructive Pulmonary 

Disease (COPD), Pneumonia, Respiratory Failure - Chronic respiratory failure 

with hypoxia


Neurological Medical History: Reports: Seizures - Single seizure in the remote 

past


Endocrine Medical History: 


   Denies: Diabetes Mellitus Type 1, Diabetes Mellitus Type 2, Hyperthyroidism, 

Hypothyroidism


GI Medical History: 


   Denies: Cirrhosis, Crohn's Disease, Gastroesophageal Reflux Disease, 

Hepatitis, Ulcerative Colitis


Musculoskeltal Medical History: Reports: Arthritis


   Denies: Gout


Skin Medical History: 


   Denies: Eczema, Psoriasis


Psychiatric Medical History: Reports: Bipolar Disorder, Depression


Hematology: 


   Denies: Anemia, Bleeding Tendencies


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





Past Surgical History


Past Surgical History: Reports:  Section, Orthopedic Surgery - right 

knee


   Denies: Hysterectomy, Pacemaker





Social History


Smoking Status: Current Every Day Smoker


Frequency of Alcohol Use: None


Hx Recreational Drug Use: Yes


Drugs: Marijuana


Hx Prescription Drug Abuse: No





- Advance Directive


Resuscitation Status: Full Code





Family History


Family History: COPD, Hypertension.  denies: CAD, CVA, DM, Malignancy


Parental Family History Reviewed: Yes


Children Family History Reviewed: Yes


Sibling(s) Family History Reviewed.: Yes





Medication/Allergy


Home Medications: 








Aripiprazole [Abilify 5 mg Tablet] 5 mg PO DAILY 10/01/19 


Bupropion HCl [Wellbutrin Xl 300mg 24hr Tablet] 300 mg PO DAILY 10/01/19 


Citalopram Hydrobromide [Celexa 40 mg Tablet] 40 mg PO DAILY 10/01/19 


Risperidone [Risperdal] 3 mg PO Q12 10/01/19 


Azithromycin [Zithromax 250 mg Tablet] 250 mg PO MOWEFR@1000 20 


Benztropine Mesylate [Cogentin 1 mg Tablet] 1 tab PO BID 20 


Furosemide [Lasix 20 mg Tablet] 10 mg PO BID 20 


Albuterol Sulfate [Ventolin 0.083% Neb 2.5 mg/3 mL Ampul] 1 vial NEB Q6 #90 vial

20 


Budesonide [Pulmicort 90 mcg Flexhaler] 1 inh IH DAILY #2 inhaler 20 


Prednisone [Deltasone 20 mg Tablet] 20 mg PO BID 3 Days 20 


Tiotropium Bromide [Spiriva Respimat] 1 puff IH BID #2 inhaler 20 


Azithromycin [Zithromax 250 mg Tablet] 250 mg PO ASDIR PRN #6 tablet 20 


Prednisone [Deltasone 20 mg Tablet] 1 tab PO DAILY 4 Days #4 tablet 20 


Albuterol Sulfate [Proair HFA Inhalation Aerosol 8.5 gm MDI] 2 puff IH Q4HP PRN 

#1 20 


Albuterol Sulfate [Proair HFA Inhalation Aerosol 8.5 gm MDI] 2 puff IH Q4H PRN 

#1 mdi 10/17/20 


Doxycycline Hyclate [Vibramycin 100 mg Tablet] 100 mg PO BID 7 Days #14 tablet 

10/17/20 


Prednisone [Deltasone 20 mg Tablet] 3 tab PO DAILY 5 Days #15 tablet 10/17/20 








Allergies/Adverse Reactions: 


                                        





No Known Allergies Allergy (Verified 10/17/20 02:07)


   











Review of Systems


Constitutional: ABSENT: chills, fever(s)


Eyes: ABSENT: visual disturbances


Ears: ABSENT: hearing changes


Nose, Mouth, and Throat: ABSENT: headache(s)


Cardiovascular: ABSENT: chest pain


Respiratory: PRESENT: cough, dyspnea


Gastrointestinal: ABSENT: diarrhea, nausea, vomiting


Musculoskeletal: ABSENT: joint swelling


Integumentary: ABSENT: diaphoresis


Neurological: PRESENT: dizziness


Psychiatric: ABSENT: anxiety


Endocrine: ABSENT: polydipsia


Hematologic/Lymphatic: ABSENT: lymphadenopathy


Allergic/Immunologic: ABSENT: seasonal rhinorrhea





Physical Exam


Vital Signs: 


                                        











Temp Pulse Resp BP Pulse Ox


 


 98.4 F      27 H  165/86 H  100 


 


 20 13:06     20 20:00  20 18:01  20 20:00








                                 Intake & Output











 20





 06:59 06:59 06:59


 


Intake Total   1000


 


Balance   1000


 


Weight   63.1 kg











General appearance: PRESENT: no acute distress, cooperative


Eye exam: PRESENT: EOMI


Mouth exam: PRESENT: neck supple


Neck exam: ABSENT: JVD


Respiratory exam: PRESENT: symmetrical, unlabored, wheezes - Mild end expiratory

wheezing bilaterally.  ABSENT: accessory muscle use, crackles, retraction, 

tachypnea


Cardiovascular exam: PRESENT: RRR, +S1, +S2.  ABSENT: tachycardia


GI/Abdominal exam: PRESENT: soft.  ABSENT: rebound, rigid, tenderness


Extremities exam: ABSENT: pedal edema


Neurological exam: PRESENT: alert, awake, oriented to person, oriented to place,

oriented to time, oriented to situation


Psychiatric exam: ABSENT: agitated, anxious


Focused psych exam: ABSENT: pressured speech


Skin exam: ABSENT: jaundice





Results


Laboratory Results: 


                                        





                                 20 12:56 





                                 20 12:56 





                                        











  20





  12:56 12:56 13:20


 


WBC  5.8  


 


RBC  3.72  


 


Hgb  10.2 L  


 


Hct  31.6 L  


 


MCV  85  


 


MCH  27.4  


 


MCHC  32.3  


 


RDW  14.5 H  


 


Plt Count  217  


 


Seg Neutrophils %  68.0  


 


Carbonic Acid   


 


HCO3/H2CO3 Ratio   


 


ABG pH   


 


ABG pCO2   


 


ABG pO2   


 


ABG HCO3   


 


ABG O2 Saturation   


 


ABG Base Excess   


 


FiO2   


 


Sodium   130.2 L 


 


Potassium   4.1 


 


Chloride   85 L 


 


Carbon Dioxide   39 H 


 


Anion Gap   6 


 


BUN   10 


 


Creatinine   0.83 


 


Est GFR ( Amer)   > 60 


 


Glucose   87 


 


Calcium   9.2 


 


Total Bilirubin   0.5 


 


AST   21 


 


Alkaline Phosphatase   86 


 


Total Protein   5.7 L 


 


Albumin   3.5 


 


Urine Color    COLORLESS


 


Urine Appearance    CLEAR


 


Urine pH    8.0


 


Ur Specific Gravity    1.003


 


Urine Protein    NEGATIVE


 


Urine Glucose (UA)    NEGATIVE


 


Urine Ketones    NEGATIVE


 


Urine Blood    NEGATIVE


 


Urine Nitrite    NEGATIVE


 


Ur Leukocyte Esterase    NEGATIVE














  20





  18:12


 


WBC 


 


RBC 


 


Hgb 


 


Hct 


 


MCV 


 


MCH 


 


MCHC 


 


RDW 


 


Plt Count 


 


Seg Neutrophils % 


 


Carbonic Acid  2.26 H


 


HCO3/H2CO3 Ratio  16:1


 


ABG pH  7.32 L


 


ABG pCO2  75.1 H*


 


ABG pO2  69.7 L


 


ABG HCO3  37.7 H


 


ABG O2 Saturation  91.8 L


 


ABG Base Excess  8.7


 


FiO2  3L


 


Sodium 


 


Potassium 


 


Chloride 


 


Carbon Dioxide 


 


Anion Gap 


 


BUN 


 


Creatinine 


 


Est GFR (African Amer) 


 


Glucose 


 


Calcium 


 


Total Bilirubin 


 


AST 


 


Alkaline Phosphatase 


 


Total Protein 


 


Albumin 


 


Urine Color 


 


Urine Appearance 


 


Urine pH 


 


Ur Specific Gravity 


 


Urine Protein 


 


Urine Glucose (UA) 


 


Urine Ketones 


 


Urine Blood 


 


Urine Nitrite 


 


Ur Leukocyte Esterase 








                                        











  20





  12:56 12:56


 


Troponin I  < 0.012 


 


NT-Pro-B Natriuret Pep   113











Impressions: 


                                        





Chest X-Ray  20 13:07


IMPRESSION:  Stable radiographic appearance of the chest demonstrating minimally

increased interstitial markings at the right lung base.


 














Assessment and Plan





- Diagnosis


(1) COPD with acute exacerbation


Is this a current diagnosis for this admission?: Yes   


Plan: 


Chest x-ray does not show any consolidation.


EMS performed rapid Covid antigen test which was reported to be negative.


Notably has COPD GOLD C-D and uses trelegy at home.  


We will treat patient with frequent DuoNeb's, prednisone and azithromycin.  

Budesonide every 12.








(2) Chronic respiratory failure with hypercapnia


Is this a current diagnosis for this admission?: Yes   


Plan: 


ABG shows pH 7.32, PCO2 75, PO2 69 on 3 L.  Bicarb is 39.  Suggestive of chronic

CO2 retention.


I did notice that her SPO2 was 100% on 3 L which is what she uses at baseline.  

Given instructions to nurse to avoid over oxygenation and actually put her on 

room air or 1 L max to keep her sats in the low 90s maximum.





She is mentating well and no longer in respiratory distress.  Continue treatment

with bronchodilators.


She may benefit from nocturnal NIPPV therapy in the long run.  She was supposed 

to follow-up with her pulmonologist regarding this.








(3) Hypertension


Qualifiers: 


 


Is this a current diagnosis for this admission?: Yes   


Plan: 


Resume home medications








(4) Tobacco use disorder, continuous


Is this a current diagnosis for this admission?: Yes   


Plan: 


Nicotine patch offered.  Smoking cessation counseling offered.








(5) Hyponatremia


Is this a current diagnosis for this admission?: Yes   


Plan: 


Sodium is 130 but on review of her prior visits, this seems to be around her 

baseline.








(6) Bipolar 1 disorder


Is this a current diagnosis for this admission?: Yes   


Plan: 


Resume home meds








- Time


Time Spent with patient: 35 or more minutes


Anticipated Discharge Disposition: Home, Self Care


Anticipated Discharge Timeframe: within 36 hours

## 2020-11-25 VITALS — SYSTOLIC BLOOD PRESSURE: 165 MMHG | DIASTOLIC BLOOD PRESSURE: 67 MMHG

## 2020-11-25 LAB
BASE EXCESS BLDV CALC-SCNC: 9.7 MMOL/L
HCO3 BLDV-SCNC: 38.1 MMOL/L (ref 20–32)
PCO2 BLDV: 75.8 MMHG (ref 35–63)
PH BLDV: 7.32 [PH] (ref 7.3–7.42)

## 2020-11-25 RX ADMIN — IPRATROPIUM BROMIDE AND ALBUTEROL SULFATE SCH ML: 2.5; .5 SOLUTION RESPIRATORY (INHALATION) at 08:40

## 2020-11-25 RX ADMIN — ENOXAPARIN SODIUM SCH MG: 40 INJECTION SUBCUTANEOUS at 09:25

## 2020-11-25 RX ADMIN — IPRATROPIUM BROMIDE AND ALBUTEROL SULFATE SCH ML: 2.5; .5 SOLUTION RESPIRATORY (INHALATION) at 04:34

## 2020-11-25 RX ADMIN — IPRATROPIUM BROMIDE AND ALBUTEROL SULFATE SCH ML: 2.5; .5 SOLUTION RESPIRATORY (INHALATION) at 00:07

## 2020-11-25 RX ADMIN — Medication SCH ML: at 06:17

## 2020-11-25 RX ADMIN — RISPERIDONE SCH: 1 TABLET ORAL at 11:33

## 2020-11-25 RX ADMIN — BUPROPION HYDROCHLORIDE SCH MG: 75 TABLET, FILM COATED ORAL at 06:15

## 2020-11-25 NOTE — PDOC DISCHARGE SUMMARY
Impression





- Admit/DC Date/PCP


Admission Date/Primary Care Provider: 


  11/24/20 20:57





  ULYSSES GRANT





Discharge Date: 11/25/20





- Discharge Diagnosis


(1) COPD with acute exacerbation


Is this a current diagnosis for this admission?: Yes   





(2) Chronic respiratory failure with hypercapnia


Is this a current diagnosis for this admission?: Yes   





(3) Hypertension


Is this a current diagnosis for this admission?: Yes   





(4) Tobacco use disorder, continuous


Is this a current diagnosis for this admission?: Yes   





(5) Bipolar 1 disorder


Is this a current diagnosis for this admission?: Yes   





(6) Hyponatremia


Is this a current diagnosis for this admission?: Yes   





- Additional Information


Resuscitation Status: Full Code


Referrals: 


JASPER BLACKWOOD FNP-C [Primary Care Provider] - 11/30/20 11:30 am


Prescriptions: 


Prednisone [Deltasone 20 mg Tablet] 40 mg PO DAILY #10 tablet


Nicotine [Nicoderm 14 mg/24 Hr Transdermal Patch] 1 each TD DAILYP PRN #30 

[id number]


 PRN Reason: 


Home Medications: 








Bupropion HCl [Wellbutrin Xl 300mg 24hr Tablet] 300 mg PO QAM 10/01/19 


Citalopram Hydrobromide [Celexa 40 mg Tablet] 40 mg PO DAILY 10/01/19 


Risperidone [Risperdal] 6 mg PO DAILY 10/01/19 


Azithromycin [Zithromax 250 mg Tablet] 250 mg PO MOWEFR@1000 02/22/20 


Furosemide [Lasix 20 mg Tablet] 20 mg PO QAM 02/22/20 


Albuterol Sulfate [Proair HFA Inhalation Aerosol 8.5 gm MDI] 2 puff IH Q4HP PRN 

#1 07/07/20 


Alprazolam [Xanax 0.5 mg Tablet] 0.5 mg PO BIDP PRN 11/25/20 


Amlodipine Besylate [Norvasc 5 mg Tablet] 5 mg PO DAILY 11/25/20 


Budesonide [Pulmicort Neb 0.5 mg/2 ml Ampul] 0.5 mg NEB RTQ12 11/25/20 


Buspirone HCl 7.5 mg PO BID 11/25/20 


Nicotine [Nicoderm 14 mg/24 Hr Transdermal Patch] 1 each TD DAILYP PRN #30 

[email or web address] 11/25/20 


Perforomist 1 vial NEB RTBID 11/25/20 


Prednisone [Deltasone 20 mg Tablet] 40 mg PO DAILY #10 tablet 11/25/20 











History of Present Illiness


History of Present Illness: 





CICI KIRKPATRICK is a 55 year old female with history of COPD on 3 L nasal 

cannula, hypertension and tobacco abuse, who presents to the hospital with 

complaint of acute onset shortness of breath which started earlier today.  The 

episode was incited by smoking cigarettes.  She began to have worsening cough.  

She tried to write it out but no shortness of breath became more severe to the 

point where she felt like she was about to pass out.  He subsequently called 

EMS.  On arrival of the EMS patient was noted to be hypoxic at 86% on 3 L nasal 

cannula and was given steroids, breathing treatments as well as magnesium in the

field.  She was also tested for COVID-19 the rapid antigen test en route which 

was negative.  Patient denies any chest pain fever chills or any sick contacts. 

She uses her nebulizer machine at home.  Also reports recent treatment with 

antibiotics for pneumonia.











Hospital Course


Hospital Course: 


(1) COPD with acute exacerbation


Chest x-ray does not show any consolidation.


EMS performed rapid Covid antigen test which was reported to be negative.


Notably has COPD GOLD C-D and uses trelegy at home.  


Patient received frequent DuoNeb's, prednisone and azithromycin.  Budesonide 

every 12.





(2) Chronic respiratory failure with hypercapnia


ABG shows pH 7.32, PCO2 75, PO2 69 on 3 L.  Bicarb is 39.  Suggestive of chronic

CO2 retention.


I did notice that her SPO2 was 100% on 3 L which is what she uses at baseline.  

Given instructions to nurse to avoid over oxygenation and actually put her on 

room air or 1 L max to keep her sats in the low 90s maximum.





She is mentating well and no longer in respiratory distress.  Continue treatment

with bronchodilators.


She may benefit from nocturnal NIPPV therapy in the long run.  She was supposed 

to follow-up with her pulmonologist regarding this.





(3) Hypertension


Resume home medications





(4) Tobacco use disorder, continuous


Nicotine patch offered.  Smoking cessation counseling offered.





(5) Hyponatremia


Sodium is 130 but on review of her prior visits, this seems to be around her 

baseline.





(6) Bipolar 1 disorder


Resume home meds





Patient is currently at her baseline.  She feels a lot better and wants to go 

home.  We reinforced smoking cessation importance in her situation.  We sent her

with a prescription of prednisone and nicotine patches.











Physical Exam


Vital Signs: 


                                        











Temp Pulse Resp BP Pulse Ox


 


 97.8 F   104 H  19   146/60 H  96 


 


 11/25/20 10:00  11/25/20 08:41  11/25/20 08:41  11/25/20 08:41  11/25/20 08:41








                                 Intake & Output











 11/24/20 11/25/20 11/26/20





 06:59 06:59 06:59


 


Intake Total  1622 370


 


Balance  1622 370


 


Weight  110 lb 0.171 oz 











Exam: 





General appearance: PRESENT: no acute distress, cooperative


Eye exam: PRESENT: EOMI


Mouth exam: PRESENT: neck supple


Neck exam: ABSENT: JVD


Respiratory exam: PRESENT: symmetrical, unlabored, wheezes - Mild end expiratory

wheezing bilaterally.  ABSENT: accessory muscle use, crackles, retraction, 

tachypnea


Cardiovascular exam: PRESENT: RRR, +S1, +S2.  ABSENT: tachycardia


GI/Abdominal exam: PRESENT: soft.  ABSENT: rebound, rigid, tenderness


Extremities exam: ABSENT: pedal edema


Neurological exam: PRESENT: alert, awake, oriented to person, oriented to place,

oriented to time, oriented to situation


Psychiatric exam: ABSENT: agitated, anxious


Focused psych exam: ABSENT: pressured speech


Skin exam: ABSENT: jaundice











Results


Laboratory Results: 


                                        











WBC  5.8 10^3/uL (4.0-10.5)   11/24/20  12:56    


 


RBC  3.72 10^6/uL (3.72-5.28)   11/24/20  12:56    


 


Hgb  10.2 g/dL (12.0-15.5)  L  11/24/20  12:56    


 


Hct  31.6 % (36.0-47.0)  L  11/24/20  12:56    


 


MCV  85 fl (80-97)   11/24/20  12:56    


 


MCH  27.4 pg (27.0-33.4)   11/24/20  12:56    


 


MCHC  32.3 g/dL (32.0-36.0)   11/24/20  12:56    


 


RDW  14.5 % (11.5-14.0)  H  11/24/20  12:56    


 


Plt Count  217 10^3/uL (150-450)   11/24/20  12:56    


 


Lymph % (Auto)  20.9 % (13-45)   11/24/20  12:56    


 


Mono % (Auto)  9.5 % (3-13)   11/24/20  12:56    


 


Eos % (Auto)  1.1 % (0-6)   11/24/20  12:56    


 


Baso % (Auto)  0.5 % (0-2)   11/24/20  12:56    


 


Absolute Neuts (auto)  3.9 10^3/uL (1.7-8.2)   11/24/20  12:56    


 


Absolute Lymphs (auto)  1.2 10^3/uL (0.5-4.7)   11/24/20  12:56    


 


Absolute Monos (auto)  0.6 10^3/uL (0.1-1.4)   11/24/20  12:56    


 


Absolute Eos (auto)  0.1 10^3/uL (0.0-0.6)   11/24/20  12:56    


 


Absolute Basos (auto)  0.0 10^3/uL (0.0-0.2)   11/24/20  12:56    


 


Seg Neutrophils %  68.0 % (42-78)   11/24/20  12:56    


 


Carbonic Acid  2.26 mmol/L (1.05-1.35)  H  11/24/20  18:12    


 


HCO3/H2CO3 Ratio  16:1   11/24/20  18:12    


 


ABG pH  7.32  (7.35-7.45)  L  11/24/20  18:12    


 


ABG pCO2  75.1 mmHg (35-45)  H*  11/24/20  18:12    


 


ABG pO2  69.7 mmHg ()  L  11/24/20  18:12    


 


ABG HCO3  37.7 mmol/L (20-24)  H  11/24/20  18:12    


 


ABG Total CO2  40.0 mmol/L (21-25)  H  11/24/20  18:12    


 


ABG O2 Saturation  91.8 % (94-98)  L  11/24/20  18:12    


 


ABG Base Excess  8.7 mmol/L  11/24/20  18:12    


 


VBG pH  7.32  (7.30-7.42)   11/25/20  04:49    


 


VBG pCO2  75.8 mmHg (35-63)  H*  11/25/20  04:49    


 


VBG HCO3  38.1 mmol/L (20-32)  H  11/25/20  04:49    


 


VBG Base Excess  9.7 mmol/L  11/25/20  04:49    


 


FiO2  3L   11/24/20  18:12    


 


Sodium  130.2 mmol/L (137-145)  L  11/24/20  12:56    


 


Potassium  4.1 mmol/L (3.6-5.0)   11/24/20  12:56    


 


Chloride  85 mmol/L ()  L  11/24/20  12:56    


 


Carbon Dioxide  39 mmol/L (22-30)  H  11/24/20  12:56    


 


Anion Gap  6  (5-19)   11/24/20  12:56    


 


BUN  10 mg/dL (7-20)   11/24/20  12:56    


 


Creatinine  0.83 mg/dL (0.52-1.25)   11/24/20  12:56    


 


Est GFR ( Amer)  > 60  (>60)   11/24/20  12:56    


 


Est GFR (MDRD) Non-Af  > 60  (>60)   11/24/20  12:56    


 


Glucose  87 mg/dL ()   11/24/20  12:56    


 


Calcium  9.2 mg/dL (8.4-10.2)   11/24/20  12:56    


 


Total Bilirubin  0.5 mg/dL (0.2-1.3)   11/24/20  12:56    


 


Direct Bilirubin  0.2 mg/dL (0.0-0.4)   11/24/20  12:56    


 


Neonat Total Bilirubin  Not Reportable   11/24/20  12:56    


 


Neonat Direct Bilirubin  Not Reportable   11/24/20  12:56    


 


Neonat Indirect Bili  Not Reportable   11/24/20  12:56    


 


AST  21 U/L (14-36)   11/24/20  12:56    


 


ALT  11 U/L (<35)   11/24/20  12:56    


 


Alkaline Phosphatase  86 U/L ()   11/24/20  12:56    


 


Troponin I  < 0.012 ng/mL  11/24/20  12:56    


 


NT-Pro-B Natriuret Pep  113 pg/mL (<125)   11/24/20  12:56    


 


Total Protein  5.7 g/dL (6.3-8.2)  L  11/24/20  12:56    


 


Albumin  3.5 g/dL (3.5-5.0)   11/24/20  12:56    


 


Urine Color  COLORLESS   11/24/20  13:20    


 


Urine Appearance  CLEAR   11/24/20  13:20    


 


Urine pH  8.0  (5.0-9.0)   11/24/20  13:20    


 


Ur Specific Gravity  1.003   11/24/20  13:20    


 


Urine Protein  NEGATIVE mg/dL (NEGATIVE)   11/24/20  13:20    


 


Urine Glucose (UA)  NEGATIVE mg/dL (NEGATIVE)   11/24/20  13:20    


 


Urine Ketones  NEGATIVE mg/dL (NEGATIVE)   11/24/20  13:20    


 


Urine Blood  NEGATIVE  (NEGATIVE)   11/24/20  13:20    


 


Urine Nitrite  NEGATIVE  (NEGATIVE)   11/24/20  13:20    


 


Urine Bilirubin  NEGATIVE  (NEGATIVE)   11/24/20  13:20    


 


Urine Urobilinogen  NEGATIVE mg/dL (<2.0)   11/24/20  13:20    


 


Ur Leukocyte Esterase  NEGATIVE  (NEGATIVE)   11/24/20  13:20    


 


Squamous Epi Cells Auto  <1 /HPF  11/24/20  13:20    


 


Urine Mucus (Auto)  RARE /LPF  11/24/20  13:20    


 


Urine Ascorbic Acid  NEGATIVE  (NEGATIVE)   11/24/20  13:20    


 


COVID-19 Source  Cancelled   11/24/20  15:36    


 


COVID-19 ([facility])  Cancelled   11/24/20  15:36    


 


Influenza A (RT-PCR)  NEGATIVE  (NEGATIVE)   11/24/20  15:36    


 


Influenza B (RT-PCR)  NEGATIVE  (NEGATIVE)   11/24/20  15:36    


 


RSV (RT-PCR)  NEGATIVE  (NEGATIVE)   11/24/20  15:36    


 


SARS-CoV-2 Rap RNA(RT-PCR)  NEGATIVE  (NEGATIVE)   11/24/20  15:36    








                                        











  11/24/20 11/24/20





  12:56 12:56


 


Troponin I  < 0.012 


 


NT-Pro-B Natriuret Pep   113











Impressions: 


                                        





Chest X-Ray  11/24/20 13:07


IMPRESSION:  Stable radiographic appearance of the chest demonstrating minimally

increased interstitial markings at the right lung base.


 














Stroke


Is this a Stroke Patient?: No





Acute Heart Failure


Is this a Heart Failure Patient?: No

## 2020-12-14 ENCOUNTER — HOSPITAL ENCOUNTER (INPATIENT)
Dept: HOSPITAL 62 - ER | Age: 55
LOS: 4 days | Discharge: HOSPICE | DRG: 522 | End: 2020-12-18
Attending: INTERNAL MEDICINE | Admitting: INTERNAL MEDICINE
Payer: COMMERCIAL
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
## 2020-12-14 DIAGNOSIS — Z99.81: ICD-10-CM

## 2020-12-14 DIAGNOSIS — J96.11: ICD-10-CM

## 2020-12-14 DIAGNOSIS — Z79.899: ICD-10-CM

## 2020-12-14 DIAGNOSIS — S72.011A: Primary | ICD-10-CM

## 2020-12-14 DIAGNOSIS — F31.9: ICD-10-CM

## 2020-12-14 DIAGNOSIS — Z66: ICD-10-CM

## 2020-12-14 DIAGNOSIS — I50.9: ICD-10-CM
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
## 2020-12-14 DIAGNOSIS — I11.0: ICD-10-CM

## 2020-12-14 DIAGNOSIS — R77.8: ICD-10-CM

## 2020-12-14 DIAGNOSIS — Z20.828: ICD-10-CM

## 2020-12-14 DIAGNOSIS — J43.9: ICD-10-CM

## 2020-12-14 DIAGNOSIS — M19.90: ICD-10-CM

## 2020-12-14 DIAGNOSIS — Z79.51: ICD-10-CM

## 2020-12-14 DIAGNOSIS — Z82.49: ICD-10-CM

## 2020-12-14 DIAGNOSIS — Z87.891: ICD-10-CM

## 2020-12-14 DIAGNOSIS — J96.12: ICD-10-CM

## 2020-12-14 DIAGNOSIS — E87.1: ICD-10-CM

## 2020-12-14 LAB
ADD MANUAL DIFF: NO
ANION GAP SERPL CALC-SCNC: 2 MMOL/L (ref 5–19)
BASOPHILS # BLD AUTO: 0 10^3/UL (ref 0–0.2)
BASOPHILS NFR BLD AUTO: 0.2 % (ref 0–2)
BUN SERPL-MCNC: 19 MG/DL (ref 7–20)
CALCIUM: 8.4 MG/DL (ref 8.4–10.2)
CHLORIDE SERPL-SCNC: 94 MMOL/L (ref 98–107)
CO2 SERPL-SCNC: 36 MMOL/L (ref 22–30)
EOSINOPHIL # BLD AUTO: 0.1 10^3/UL (ref 0–0.6)
EOSINOPHIL NFR BLD AUTO: 0.5 % (ref 0–6)
ERYTHROCYTE [DISTWIDTH] IN BLOOD BY AUTOMATED COUNT: 14.7 % (ref 11.5–14)
GLUCOSE SERPL-MCNC: 126 MG/DL (ref 75–110)
HCT VFR BLD CALC: 34.9 % (ref 36–47)
HGB BLD-MCNC: 11.2 G/DL (ref 12–15.5)
LYMPHOCYTES # BLD AUTO: 1 10^3/UL (ref 0.5–4.7)
LYMPHOCYTES NFR BLD AUTO: 8.7 % (ref 13–45)
MCH RBC QN AUTO: 26.6 PG (ref 27–33.4)
MCHC RBC AUTO-ENTMCNC: 32.1 G/DL (ref 32–36)
MCV RBC AUTO: 83 FL (ref 80–97)
MONOCYTES # BLD AUTO: 0.7 10^3/UL (ref 0.1–1.4)
MONOCYTES NFR BLD AUTO: 5.7 % (ref 3–13)
NEUTROPHILS # BLD AUTO: 9.9 10^3/UL (ref 1.7–8.2)
NEUTS SEG NFR BLD AUTO: 84.9 % (ref 42–78)
PLATELET # BLD: 218 10^3/UL (ref 150–450)
POTASSIUM SERPL-SCNC: 4.7 MMOL/L (ref 3.6–5)
RBC # BLD AUTO: 4.21 10^6/UL (ref 3.72–5.28)
TOTAL CELLS COUNTED % (AUTO): 100 %
WBC # BLD AUTO: 11.7 10^3/UL (ref 4–10.5)

## 2020-12-14 PROCEDURE — C9803 HOPD COVID-19 SPEC COLLECT: HCPCS

## 2020-12-14 PROCEDURE — 85025 COMPLETE CBC W/AUTO DIFF WBC: CPT

## 2020-12-14 PROCEDURE — 93010 ELECTROCARDIOGRAM REPORT: CPT

## 2020-12-14 PROCEDURE — 36600 WITHDRAWAL OF ARTERIAL BLOOD: CPT

## 2020-12-14 PROCEDURE — 80061 LIPID PANEL: CPT

## 2020-12-14 PROCEDURE — 83036 HEMOGLOBIN GLYCOSYLATED A1C: CPT

## 2020-12-14 PROCEDURE — 01210 ANES OPEN PX HIP JOINT NOS: CPT

## 2020-12-14 PROCEDURE — 71045 X-RAY EXAM CHEST 1 VIEW: CPT

## 2020-12-14 PROCEDURE — 36415 COLL VENOUS BLD VENIPUNCTURE: CPT

## 2020-12-14 PROCEDURE — 84443 ASSAY THYROID STIM HORMONE: CPT

## 2020-12-14 PROCEDURE — 80053 COMPREHEN METABOLIC PANEL: CPT

## 2020-12-14 PROCEDURE — 82550 ASSAY OF CK (CPK): CPT

## 2020-12-14 PROCEDURE — 83735 ASSAY OF MAGNESIUM: CPT

## 2020-12-14 PROCEDURE — 93005 ELECTROCARDIOGRAM TRACING: CPT

## 2020-12-14 PROCEDURE — 80048 BASIC METABOLIC PNL TOTAL CA: CPT

## 2020-12-14 PROCEDURE — 82803 BLOOD GASES ANY COMBINATION: CPT

## 2020-12-14 PROCEDURE — 93306 TTE W/DOPPLER COMPLETE: CPT

## 2020-12-14 PROCEDURE — 99285 EMERGENCY DEPT VISIT HI MDM: CPT

## 2020-12-14 PROCEDURE — 84484 ASSAY OF TROPONIN QUANT: CPT

## 2020-12-14 RX ADMIN — HYDROMORPHONE HYDROCHLORIDE PRN MG: 2 INJECTION INTRAMUSCULAR; INTRAVENOUS; SUBCUTANEOUS at 19:10

## 2020-12-14 RX ADMIN — BUPROPION HYDROCHLORIDE SCH MG: 75 TABLET, FILM COATED ORAL at 22:30

## 2020-12-14 RX ADMIN — ENOXAPARIN SODIUM SCH MG: 40 INJECTION SUBCUTANEOUS at 20:55

## 2020-12-14 RX ADMIN — Medication SCH ML: at 22:31

## 2020-12-14 RX ADMIN — HYDROMORPHONE HYDROCHLORIDE PRN MG: 2 INJECTION INTRAMUSCULAR; INTRAVENOUS; SUBCUTANEOUS at 17:03

## 2020-12-14 RX ADMIN — HYDROMORPHONE HYDROCHLORIDE PRN MG: 2 INJECTION INTRAMUSCULAR; INTRAVENOUS; SUBCUTANEOUS at 22:30

## 2020-12-14 NOTE — PDOC H&P
History of Present Illness


Admission Date/PCP: 


  20 19:47





  ULYSSES GRANT





Patient complains of: Fell, right hip pain.


History of Present Illness: 


CICI KIRKPATRICK is a 55 year old female


The patient is ambulatory. 2 days ago she fell at home. She fell on her right 

hip. Ever since then she is unable to walk. Because of the persistent severe 

right hip area pain and inability to walk she came to the emergency department. 

She was found to have a displaced right subcapital femoral neck fracture. She 

does not have any other complaint except the right hip area pain which actually 

controlled with intravenous hydromorphone. To me she denies having any chest 

pain or shortness of breath earlier she mentions some chest pain and a troponin 

was run which is slightly elevated. Clinically the patient does not appear to 

have any kind of ongoing cardiac ischemia. She has chronic shortness of breath 

and on chronic oxygen,she did not appear to be in any respiratory distress. She 

had no arrhythmia. Her blood pressure is stable.


Patient has advanced COPD and chronic hypoxic respiratory failure on 24/7 oxygen

3 L/min via nasal cannula. Since she quit smoking about a month ago her 

respiratory status is better. She has no increased wheezing, coughing or other 

acute respiratory symptoms.


Patient enrolled in home hospice about 1-1/2-week ago. She would like to be 

actively treated at this point and she would like to be full code. She is 

pleasant and cooperative and has good understanding of her problems.


She never had coronary artery disease, never had any cardiac work-up. She was 

told having congestive heart failure and taking 20 mg furosemide daily. Does not

know more details about this problem.





Past Medical History


Cardiac Medical History: Reports: Congestive Heart Failure - No details 

available., Hypertension


   Denies: Atrial Fibrillation, Coronary Artery Disease, DVT, Myocardial 

Infarction, Hyperlipidema, Pulmonary Embolism


Pulmonary Medical History: Reports: Chronic Obstructive Pulmonary Disease (COPD)

- With chronic hypoxic respiratory failure on 3 L/min nasal oxygen, Pneumonia, 

Respiratory Failure - Chronic respiratory failure with hypoxia


EENT Medical History: Reports: None


Neurological Medical History: Reports: Seizures - Single seizure in the remote 

past


Endocrine Medical History: Reports: None, Diabetes Mellitus Type 1


Renal/ Medical History: Reports: None


Malignancy Medical History: Reports: None


GI Medical History: Reports: Other - History of esophageal stricture requiring 

dilatation.


Musculoskeltal Medical History: Reports: Arthritis


   Denies: Gout


Skin Medical History: Reports: None


Psychiatric Medical History: Reports: Bipolar Disorder, Depression


Traumatic Medical History: Reports: None


Hematology: Reports: None


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





Past Surgical History


Past Surgical History: Reports:  Section, Orthopedic Surgery - right 

knee


   Denies: Hysterectomy, Pacemaker





Social History


Information Source: Patient


Lives with: Family


Smoking Status: Former Smoker - Quit smoking about a month ago


Electronic Cigarette use?: No


Frequency of Alcohol Use: None


Hx Recreational Drug Use: Yes


Drugs: Marijuana


Hx Prescription Drug Abuse: No





Family History


Family History: COPD, Hypertension.  denies: CAD, CVA, DM, Malignancy


Parental Family History Reviewed: Yes


Children Family History Reviewed: Yes


Sibling(s) Family History Reviewed.: Yes





Medication/Allergy


Home Medications: 








Bupropion HCl [Wellbutrin Xl 300mg 24hr Tablet] 300 mg PO QAM 10/01/19 


Citalopram Hydrobromide [Celexa 40 mg Tablet] 40 mg PO DAILY 10/01/19 


Risperidone [Risperdal] 6 mg PO DAILY 10/01/19 


Furosemide [Lasix 20 mg Tablet] 20 mg PO QAM 20 


Amlodipine Besylate [Norvasc 5 mg Tablet] 5 mg PO DAILY 20 


Budesonide [Pulmicort Neb 0.5 mg/2 ml Ampul] 0.5 mg NEB RTQ12 20 


Buspirone HCl 7.5 mg PO BID 20 


Perforomist 1 vial NEB RTBID 20 


Albuterol Sulfate [Proair HFA Inhalation Aerosol 8.5 gm MDI] 2 puff IH Q4HP PRN 

20 


Ipratropium/Albuterol Sulfate [Duoneb 3 ml Ampul] 3 ml NEB RTQ6HP PRN 20 


Lisinopril [Zestril] 40 mg PO DAILY 20 


Lorazepam [Ativan 0.5 mg Tablet] 0.5 mg PO Q6HP PRN 20 


Morphine Sulfate 0.25 ml PO Q4HP PRN 20 


Ondansetron [Zofran Odt 4 mg Tablet] 4 mg PO Q8HP PRN 20 


Prednisone [Deltasone 20 mg Tablet] 10 mg PO DAILY 20 








Allergies/Adverse Reactions: 


                                        





No Known Allergies Allergy (Verified 10/17/20 02:07)


   











Review of Systems


Constitutional: PRESENT: weakness


Eyes: ABSENT: visual disturbances


Ears: ABSENT: hearing changes


Cardiovascular: ABSENT: chest pain, dyspnea on exertion, edema, orthropnea, 

palpitations


Respiratory: PRESENT: cough - Chronic cough. No sputum production. Chronic 

shortness of breath


Gastrointestinal: ABSENT: abdominal pain, constipation, diarrhea, hematemesis, 

hematochezia, nausea, vomiting


Musculoskeletal: PRESENT: other - Severe right hip area pain since the fall 2 

days ago.


Neurological: ABSENT: abnormal gait, abnormal speech, confusion, dizziness, 

focal weakness, syncope


Psychiatric: PRESENT: depression - History of depression. Her mood seems to be 

normal.





Physical Exam


Vital Signs: 


                                        











Temp Pulse Resp BP Pulse Ox


 


 98.3 F   100      124/76   100 


 


 20 19:10  20 19:10     20 19:10  20 19:10








                                 Intake & Output











 12/13/20 12/14/20 12/15/20





 06:59 06:59 06:59


 


Weight   65 kg











General appearance: PRESENT: no acute distress


Head exam: PRESENT: atraumatic


Eye exam: PRESENT: conjunctiva pink, EOMI, PERRLA.  ABSENT: scleral icterus


Ear exam: PRESENT: normal external ear exam


Mouth exam: PRESENT: moist, tongue midline


Neck exam: ABSENT: carotid bruit, JVD, lymphadenopathy, thyromegaly


Respiratory exam: PRESENT: rhonchi, wheezes - Scattered wheezes..  ABSENT: 

stridor


Cardiovascular exam: PRESENT: RRR.  ABSENT: diastolic murmur, rubs, systolic 

murmur


Pulses: PRESENT: normal femoral pulses, other - Weak dorsal pedal arteries with 

no sign of limb ischemia.


Vascular exam: PRESENT: normal capillary refill


GI/Abdominal exam: PRESENT: normal bowel sounds, soft.  ABSENT: distended, 

guarding, mass, organolmegaly, rebound, tenderness


Rectal exam: PRESENT: deferred


Extremities exam: PRESENT: other - Right lower extremity is rotated and 

shortened.


Musculoskeletal exam: PRESENT: ambulatory - She was fully ambulatory prior to 

the fall.


Neurological exam: PRESENT: alert, awake, oriented to person, oriented to place,

oriented to time, oriented to situation, CN II-XII grossly intact.  ABSENT: 

motor sensory deficit


Psychiatric exam: PRESENT: normal mood


Skin exam: PRESENT: dry, intact, warm.  ABSENT: cyanosis, rash





Results


Laboratory Results: 


                                        





                                 20 17:05 





                                 20 17:05 





                                        











  20





  17:05 17:05


 


WBC  11.7 H 


 


RBC  4.21 


 


Hgb  11.2 L 


 


Hct  34.9 L 


 


MCV  83 


 


MCH  26.6 L 


 


MCHC  32.1 


 


RDW  14.7 H 


 


Plt Count  218 


 


Seg Neutrophils %  84.9 H 


 


Sodium   131.7 L


 


Potassium   4.7


 


Chloride   94 L


 


Carbon Dioxide   36 H


 


Anion Gap   2 L


 


BUN   19


 


Creatinine   1.01


 


Est GFR (African Amer)   > 60


 


Glucose   126 H


 


Calcium   8.4








                                        











  20





  17:05


 


Troponin I  0.287











EKG Comments: 





Sinus rhythm with incomplete right bundle branch block.


Impressions: 


                                        





Hip/Pelvis X-Ray  20 00:00


IMPRESSION:  Acute displaced subcapital neck fracture of the right femur.  There

is no associated dislocation of the femoroacetabular joint.


 








Chest X-Ray  20 16:49


IMPRESSION:  NO ACUTE RADIOGRAPHIC FINDING IN THE CHEST.


 














Assessment and Plan





- Diagnosis


(1) Closed right hip fracture


Qualifiers: 


   Encounter type: initial encounter   Qualified Code(s): S72.001A - Fracture of

unspecified part of neck of right femur, initial encounter for closed fracture  




Is this a current diagnosis for this admission?: Yes   


Plan: 


Patient will need surgery. Orthopedic consultation was requested. Because of the

slight elevated troponin the patient may need cardiology evaluation, repeat 

troponin, telemetry monitoring, echocardiogram. Hopefully surgery can be done on

Wednesday, . I started DVT prophylaxis with Lovenox tonight, this 

should be discontinued 24 hours prior to surgery. She is on mechanical 

prophylaxis as well.








(2) Elevated troponin


Is this a current diagnosis for this admission?: Yes   


Plan: 


The patient has mildly elevated troponin but she is chest pain-free and EKG is 

not suggestive of ongoing cardiac ischemia.  Total CK is pending, in case the CK

significantly elevated the troponin is probably related to skeletal muscle 

contusion.  If the CK is not significantly elevated the patient probably has 

some troponin leak.  I placed her on telemetry monitor.  Repeat troponin.  I 

ordered her aspirin.  Echocardiogram.  Cardiology consultation was requested.


The patient has some questionable history of congestive heart failure.  I do not

find any previous documentation of ejection fraction.  At present the patient 

does not appear to be any heart failure.  I am going to hold her furosemide.








(3) Congestive heart failure


Qualifiers: 


   Heart failure type: unspecified   Heart failure chronicity: chronic   

Qualified Code(s): I50.9 - Heart failure, unspecified   


Is this a current diagnosis for this admission?: Yes   


Plan: 


The patient carries a diagnosis of congestive heart failure.  I do not find 

echocardiogram reports.  I do not find any specifics about her heart failure the

medical record.  The patient seems to be fully compensated.  I am going to hold 

furosemide.  Echocardiogram was ordered.








(4) Chronic respiratory failure with hypoxia


Is this a current diagnosis for this admission?: Yes   


Plan: 


Continue oxygen therapy.  Her respiratory status seems to be stable, at 

baseline.








(5) COPD (chronic obstructive pulmonary disease)


Qualifiers: 


   COPD type: emphysema   Emphysema type: unspecified   Qualified Code(s): J43.9

- Emphysema, unspecified   


Is this a current diagnosis for this admission?: Yes   


Plan: 


She has advanced COPD.  No sign of COPD exacerbation.  She does have some 

wheezing but it is chronic.  She quit smoking about a month ago, since then she 

is feeling better.  Continue bronchodilators.








(6) Hypertension


Qualifiers: 


   Hypertension type: essential hypertension   Qualified Code(s): I10 - 

Essential (primary) hypertension   


Is this a current diagnosis for this admission?: Yes   


Plan: 


Continue her usual blood pressure medications.








(7) Bipolar 1 disorder


Is this a current diagnosis for this admission?: Yes   


Plan: 


I resumed her antidepressants.  She is not quite sure about rest of her 

psychiatric medications.  Tomorrow medication list will be updated in the 

morning.








- Plan Summary


Summary: 


Patient was admitted with right hip fracture requiring surgery.  She was under 

home hospice care because of her advanced COPD/chronic hypoxic respiratory 

failure.  The patient is fully alert and oriented and she is able to understand 

her problems.  She would like to be full code and she would like to be operated 

on.


The patient is chest pain-free and has no EKG sign of ongoing cardiac ischemia. 

Earlier she mentioned something about chest pain and a troponin was ordered.  

Troponin is borderline elevated.  Exact etiology for the elevated troponin is 

unclear at this point.  I do not see any sign of ongoing cardiac ischemia.  The 

patient is going to have a cardiac evaluation prior to surgery.  Cardiology 

consultation was requested.  Hopefully the patient is going to be ready for 

surgery on Wednesday, .


I did the best with the medication reconciliation but the patient did not have 

the full list.  Medication reconciliation is going to be completed in the 

morning.





- Time


Time Spent with patient: 35 or more minutes


Medications reviewed and adjusted accordingly: Yes


Anticipated Discharge Disposition: Skilled Nursing Facility


Anticipated Discharge Timeframe: 5 days





- Inpatient Certification


Based on my medical assessment, after consideration of the patient's 

comorbidities, presenting symptoms, or acuity I expect that the services needed 

warrant INPATIENT care.: Yes


I certify that my determination is in accordance with my understanding of 

Medicare's requirements for reasonable and necessary INPATIENT services [42 CFR 

412.3e].: Yes


Medical Necessity: Failure to Improve With Outpatient Therapy, Need for Surgery

## 2020-12-14 NOTE — RADIOLOGY REPORT (SQ)
EXAM DESCRIPTION:  CHEST SINGLE VIEW



IMAGES COMPLETED DATE/TIME:  12/14/2020 4:58 pm



REASON FOR STUDY:  surgical clearance



COMPARISON:  11/24/2020



EXAM PARAMETERS:  NUMBER OF VIEWS: One view.

TECHNIQUE: Single frontal radiographic view of the chest acquired.

RADIATION DOSE: NA

LIMITATIONS: None.



FINDINGS:  LUNGS AND PLEURA: No opacities, masses or pneumothorax. No pleural effusion.

MEDIASTINUM AND HILAR STRUCTURES: No masses.  Contour normal.

HEART AND VASCULAR STRUCTURES: Heart normal in size.  Normal vasculature.

BONES: No acute findings.

HARDWARE: None in the chest.

OTHER: No other significant finding.



IMPRESSION:  NO ACUTE RADIOGRAPHIC FINDING IN THE CHEST.



TECHNICAL DOCUMENTATION:  JOB ID:  [id number]

 2011 [employer]- All Rights Reserved



Reading location - IP/workstation name: [email or web address]

## 2020-12-14 NOTE — RADIOLOGY REPORT (SQ)
EXAM DESCRIPTION:  HIP RIGHT AP/LATERAL



IMAGES COMPLETED DATE/TIME:  12/14/2020 4:36 pm



REASON FOR STUDY:  fall



COMPARISON:  None.



NUMBER OF VIEWS:  Two views.



TECHNIQUE:  Or an AP view of the pelvis and AP and lateral views of the right hip were obtained.



LIMITATIONS:  None.



FINDINGS:  MINERALIZATION: Decreased.

RIGHT HIP: Acute displaced subcapital neck fracture.  There is no associated dislocation of the femor
oacetabular joint.

LEFT HIP: No fracture or dislocation.

PUBIS AND ISCHIUM: The ilioischial and iliopectineal lines are intact.  There is no diastasis of the 
pubic symphysis.

PELVIS: No fracture.

SACRUM: The sacrum is obscured by overlying bowel.

LOWER LUMBAR SPINE: No acute abnormality.

SOFT TISSUES: No acute abnormality.

OTHER: No other findings.



IMPRESSION:  Acute displaced subcapital neck fracture of the right femur.  There is no associated dis
location of the femoroacetabular joint.



TECHNICAL DOCUMENTATION:  JOB ID:  [id number]

 2011 [employer]- All Rights Reserved



Reading location - IP/workstation name: [email or web address]

## 2020-12-14 NOTE — EKG REPORT
SEVERITY:- ABNORMAL ECG -

SINUS RHYTHM

INCOMPLETE RIGHT BUNDLE BRANCH BLOCK

:

Confirmed by: Eleanor Cha MD 14-Dec-2020 19:07:33

## 2020-12-14 NOTE — ER DOCUMENT REPORT
ED Fall





- General


Chief Complaint: Hip Pain


Stated Complaint: FALL/HIP PAIN


TRAVEL OUTSIDE OF THE [place] IN LAST 30 DAYS: No





- HPI


Notes: 





Chief Complaint: Fall, right hip pain





Historian: History obtained from patient





HPI:  This is a 55-year-old female presents to the ER c/o right hip pain after a

fall on Saturday evening.  She somehow got her foot caught in the leg of her 

pajama pants and fell backwards onto the ground. she has not been ambulatory 

since the fall and remained at home to see if it would get better on its own. Pt

is in hospice- [facility]- due to advanced COPD and is on 3L home 

O2 at all times. PT denies any other injuries.  She took her home prescription 

pain medications w/o relief.  She denies fever ,chills, n/v, abdom pain, cp, HA,

back pain, or neck pain. 





ROS: 


Constitutional: no fevers.


HEENT: no HA, sore throat,  or vision changes.


CV: no chest pain or palpitations.


Resp: basline sob, on 3L NC


GI: no abdominal pain, or  n/v/d.


: no dysuria,  hematuria,  or incont.


MSK: right hip pain


Skin: no rashes or itching.


Neuro: no seizures, weakness, numbness, or confusion.


Hematological: no ecchymosis  or easy bleeding.


Endocrine: no polyuria/polydipsia, no heat/cold intolerance. 


Psych: no SI/HI, AH/VH or memory loss. 





PMHx: Reviewed and agree as charted by RN.


PSHx:  Reviewed and agree as charted by RN.


SOCHx: Reviewed and agree as charted by RN.


FHX: No significant familial comorbid conditions directly related to patient 

complaint





Current Medications: Reviewed and agree with the patient medications as charted 

by the RN.





Allergies: Reviewed and agree with the listed allergies as charted by the RN








Physical Exam:





Vitals: Reviewed in chart as documented by RN. 





General:  Alert and in NAD. older than stated age


Head:  Normocephalic; atraumatic


Eyes:  PERRLA, Conjunctivae clear  sclerae non-icteric bilat


ENT:  no soft palate swelling or uvular deviation


Neck: trachea midline,  no unilateral swelling/tenderness/lymphadenopathy


CV: RRR, no M/R/G; symmetric distal pulses


Resp:  respirations even and unlabored,  CTA bilat.  3L O2 via NC


GI:  abd soft and nondistended. NTTP.  normal BS.  no masses/HSM. no CVAT bilat


MSK:  RLE- shortened and externally rotated.  Tender to right hip with palpable 

deformity.  Limited range of motion due to pain.  Distal upper leg nontender 

without deformity.  Lower leg nontender to palpation, no swelling.  Pedal pulse 

1+.  Cap refill less than 3 seconds.  Sensation intact distally.  range of 

Motion intact to foot and ankle.


Skin: warm, moist, good turgor. no rash/lesions


Neuro:  Alert and oriented X 4. following CN 2-12 intact.  no unilateral 

weakness/numbness


Psych:  No SI/HI or AH/VH. 


 


 


ED Results:





Medical Decision-Making:


Medical Decision-making/Differential Diagnosis:


Consider various etiologies including but not limited to skin/soft tissue 

structure injury, MSK injury, strain/sprain, fracture, dislocation, bursitis, 

tendonitis, contusion, ect 





Plan- hip xr in triage.  I reviewed imaging which noted obvious displaced right 

hip fracture, pending official radiology read.  I went ahead and ordered preop 

clearance labs, EKG, chest x-ray, pain control, saline lock.  





Official radiology read of hip x-ray notes acutely displaced subcapital femoral 

neck fracture.








I consulted orthopedicsDrRohit Tripp-he recommends hospitalist admission due to 

patient's history of COPD/hospice, and orthopedics will consult for expected 

surgical repair.  Dr. Tripp would like preop clearance work-up as well as a 

Covid test.  He also says patient may have a diet tonight and does not need to 

be n.p.o. as she will not be having surgery tonight.  I will consult hospitalist

for admission.





spoke w/ hospitalist- Dr Ford requests I order a troponin. They are wanting 

clarification regarding pts hospice diagnosis and prognosis due to the 

substantial risks associated w/ anesthesia and her advanced COPD.   I called 

[facility]- Kristin Ceballos- who is RN case manager twice and left a

voicemail to call back. Pts  gave me her contact information as the RN 

who was with the patient prior to coming to the ER.  Pts  also states 

that the reason for hospice is due to her COPD.  








Trop elevated 0.28.  This order was placed at the request of the hospitalist.  I

contacted the hospitalist and notified them.  There is no further intervention 

at this time regarding the elevated troponin.  This was used more as a 

perioperative marker and not to evaluated for acute ACS. 








I contacted [facility] and spoke with the on-call RNAlicia.  She 

states the patient was placed on hospice just 1 week ago, starting .  

Her primary diagnosis is chronic emphysema, secondary diagnoses are chronic 

hypercapnic respiratory failure, malnourishment, CHF.





Updated night hospitalist who agreed to admit the pti. 











This course of action was discussed with the patient and/or family. They were 

amenable to this, verbalized understanding, and were without further questions.





Diagnosis: acute, displaced subcapital femoral neck fracture, right.  Fall. COPD


Condition:  stable


Disposition: admit








- Related data


Allergies/Adverse Reactions: 


                                        





No Known Allergies Allergy (Verified 10/17/20 02:07)


   











Past Medical History





- Social History


Smoking Status: Former Smoker


Chew tobacco use (# tins/day): No


Frequency of alcohol use: None


Drug Abuse: None


Family History: Reviewed & Not Pertinent, COPD, Hypertension.  denies: CAD, CVA,

DM, Malignancy





- Past Medical History


Cardiac Medical History: Reports: Hx Congestive Heart Failure, Hx Hypertension


   Denies: Hx Atrial Fibrillation, Hx Coronary Artery Disease, Hx DVT, Hx Heart 

Attack, Hx Hypercholesterolemia, Hx Pulmonary Embolism


Pulmonary Medical History: Reports: Hx Asthma, Hx Bronchitis, Hx COPD, Hx 

Pneumonia, Hx Respiratory Failure - Chronic respiratory failure with hypoxia


Neurological Medical History: Reports: Hx Seizures - Single seizure in the 

remote past.  Denies: Hx Cerebrovascular Accident


Endocrine Medical History: Denies: Hx Diabetes Mellitus Type 1, Hx Diabetes 

Mellitus Type 2, Hx Hyperthyroidism, Hx Hypothyroidism


Renal/ Medical History: Reports: Hx Kidney Stones


GI Medical History: Denies: Hx Cirrhosis, Hx Crohn's Disease, Hx 

Gastroesophageal Reflux Disease, Hx Hepatitis, Hx Ulcerative Colitis


Musculoskeletal Medical History: Reports Hx Arthritis, Denies Hx Gout


Skin Medical History: Denies Hx Eczema, Denies Hx Psoriasis


Psychiatric Medical History: Reports: Hx Bipolar Disorder, Hx Depression


Infectious Medical History: Reports: Hx MRSA.  Denies: Hx Hepatitis


Past Surgical History: Reports: Hx  Section, Hx Orthopedic Surgery - 

right knee.  Denies: Hx Hysterectomy, Hx Pacemaker





- Immunizations


Hx Diphtheria, Pertussis, Tetanus Vaccination: No


Hx Pneumococcal Vaccination: 13





Physical Exam





- Vital signs


Vitals: 


                                        











Temp


 


 98.2 F 


 


 20 15:50














Course





- Vital Signs


Vital signs: 


                                        











Temp Pulse Resp BP Pulse Ox


 


 98.3 F   100      124/76   100 


 


 20 19:10  20 19:10     20 19:10  20 19:10














- Laboratory Results


Result Diagrams: 


                                 20 17:05





                                 20 17:05


Laboratory Results Interpreted: 


                                        











  20





  17:05 17:05


 


WBC  11.7 H 


 


Hgb  11.2 L 


 


Hct  34.9 L 


 


MCH  26.6 L 


 


RDW  14.7 H 


 


Lymph % (Auto)  8.7 L 


 


Absolute Neuts (auto)  9.9 H 


 


Seg Neutrophils %  84.9 H 


 


Sodium   131.7 L


 


Chloride   94 L


 


Carbon Dioxide   36 H


 


Anion Gap   2 L


 


Est GFR (MDRD) Non-Af   57 L


 


Glucose   126 H











Critical Laboratory Results Reviewed: Yes - attending ER doc and hospitalist 

aware


Attending or Supervising Physician who Reviewed Labs: KINDRA OZUNA JR





- Radiology Results


Critical Radiology Results Reviewed: No Critical Results - right hip fracture





Discharge





- Discharge


Clinical Impression: 


Closed right hip fracture


Qualifiers:


 Encounter type: initial encounter Qualified Code(s): S72.001A - Fracture of 

unspecified part of neck of right femur, initial encounter for closed fracture





Chronic obstructive pulmonary disease


Qualifiers:


 COPD type: unspecified COPD Qualified Code(s): J44.9 - Chronic obstructive 

pulmonary disease, unspecified





Condition: Stable


Disposition: ADMITTED AS INPATIENT


Admitting Provider: Logan (Hospitalist)


Unit Admitted: Medical Floor

## 2020-12-15 LAB
ADD MANUAL DIFF: NO
ALBUMIN SERPL-MCNC: 2.8 G/DL (ref 3.5–5)
ALP SERPL-CCNC: 68 U/L (ref 38–126)
ANION GAP SERPL CALC-SCNC: 3 MMOL/L (ref 5–19)
ARTERIAL BLOOD H2CO3: 2.1 MMOL/L (ref 1.05–1.35)
ARTERIAL BLOOD HCO3: 36.6 MMOL/L (ref 20–24)
ARTERIAL BLOOD PCO2: 69.9 MMHG (ref 35–45)
ARTERIAL BLOOD PH: 7.34 (ref 7.35–7.45)
ARTERIAL BLOOD PO2: 73.2 MMHG (ref 80–100)
ARTERIAL BLOOD TOTAL CO2: 38.8 MMOL/L (ref 21–25)
AST SERPL-CCNC: 25 U/L (ref 14–36)
BASE EXCESS BLDA CALC-SCNC: 8.6 MMOL/L
BASOPHILS # BLD AUTO: 0.1 10^3/UL (ref 0–0.2)
BASOPHILS NFR BLD AUTO: 0.7 % (ref 0–2)
BILIRUB DIRECT SERPL-MCNC: 0.2 MG/DL (ref 0–0.4)
BILIRUB SERPL-MCNC: 0.4 MG/DL (ref 0.2–1.3)
BUN SERPL-MCNC: 19 MG/DL (ref 7–20)
CALCIUM: 8.6 MG/DL (ref 8.4–10.2)
CHLORIDE SERPL-SCNC: 94 MMOL/L (ref 98–107)
CHOLEST SERPL-MCNC: 182.15 MG/DL (ref 0–200)
CK SERPL-CCNC: 58 U/L (ref 30–135)
CO2 SERPL-SCNC: 36 MMOL/L (ref 22–30)
EOSINOPHIL # BLD AUTO: 0.2 10^3/UL (ref 0–0.6)
EOSINOPHIL NFR BLD AUTO: 1.9 % (ref 0–6)
ERYTHROCYTE [DISTWIDTH] IN BLOOD BY AUTOMATED COUNT: 14.8 % (ref 11.5–14)
GLUCOSE SERPL-MCNC: 100 MG/DL (ref 75–110)
HCT VFR BLD CALC: 34.9 % (ref 36–47)
HGB BLD-MCNC: 11.2 G/DL (ref 12–15.5)
LDLC SERPL DIRECT ASSAY-MCNC: 79 MG/DL (ref ?–100)
LYMPHOCYTES # BLD AUTO: 2.1 10^3/UL (ref 0.5–4.7)
LYMPHOCYTES NFR BLD AUTO: 17.2 % (ref 13–45)
MCH RBC QN AUTO: 26.8 PG (ref 27–33.4)
MCHC RBC AUTO-ENTMCNC: 32 G/DL (ref 32–36)
MCV RBC AUTO: 84 FL (ref 80–97)
MONOCYTES # BLD AUTO: 0.7 10^3/UL (ref 0.1–1.4)
MONOCYTES NFR BLD AUTO: 5.9 % (ref 3–13)
NEUTROPHILS # BLD AUTO: 9.1 10^3/UL (ref 1.7–8.2)
NEUTS SEG NFR BLD AUTO: 74.3 % (ref 42–78)
PLATELET # BLD: 213 10^3/UL (ref 150–450)
POTASSIUM SERPL-SCNC: 4.4 MMOL/L (ref 3.6–5)
PROT SERPL-MCNC: 5 G/DL (ref 6.3–8.2)
RBC # BLD AUTO: 4.17 10^6/UL (ref 3.72–5.28)
SAO2 % BLDA: 93.2 % (ref 94–98)
TOTAL CELLS COUNTED % (AUTO): 100 %
TRIGL SERPL-MCNC: 198 MG/DL (ref ?–150)
VLDLC SERPL CALC-MCNC: 39.6 MG/DL (ref 10–31)
WBC # BLD AUTO: 12.2 10^3/UL (ref 4–10.5)

## 2020-12-15 RX ADMIN — HYDROMORPHONE HYDROCHLORIDE PRN MG: 2 INJECTION INTRAMUSCULAR; INTRAVENOUS; SUBCUTANEOUS at 10:07

## 2020-12-15 RX ADMIN — CITALOPRAM HYDROBROMIDE SCH MG: 20 TABLET ORAL at 10:06

## 2020-12-15 RX ADMIN — MORPHINE SULFATE PRN MG: 10 SOLUTION ORAL at 19:56

## 2020-12-15 RX ADMIN — ATORVASTATIN CALCIUM SCH MG: 40 TABLET, FILM COATED ORAL at 11:48

## 2020-12-15 RX ADMIN — Medication SCH: at 16:46

## 2020-12-15 RX ADMIN — BUPROPION HYDROCHLORIDE SCH MG: 75 TABLET, FILM COATED ORAL at 10:06

## 2020-12-15 RX ADMIN — ASPIRIN SCH MG: 81 TABLET, CHEWABLE ORAL at 10:07

## 2020-12-15 RX ADMIN — ENOXAPARIN SODIUM SCH MG: 40 INJECTION SUBCUTANEOUS at 10:07

## 2020-12-15 RX ADMIN — AMLODIPINE BESYLATE SCH MG: 10 TABLET ORAL at 10:06

## 2020-12-15 RX ADMIN — MORPHINE SULFATE PRN MG: 10 SOLUTION ORAL at 15:33

## 2020-12-15 RX ADMIN — Medication SCH ML: at 05:36

## 2020-12-15 RX ADMIN — HYDROMORPHONE HYDROCHLORIDE PRN MG: 2 INJECTION INTRAMUSCULAR; INTRAVENOUS; SUBCUTANEOUS at 02:36

## 2020-12-15 RX ADMIN — HYDROMORPHONE HYDROCHLORIDE PRN MG: 2 INJECTION INTRAMUSCULAR; INTRAVENOUS; SUBCUTANEOUS at 06:54

## 2020-12-15 RX ADMIN — Medication SCH ML: at 22:11

## 2020-12-15 RX ADMIN — BUPROPION HYDROCHLORIDE SCH MG: 75 TABLET, FILM COATED ORAL at 22:10

## 2020-12-15 RX ADMIN — LISINOPRIL SCH MG: 10 TABLET ORAL at 10:06

## 2020-12-15 RX ADMIN — FLUTICASONE FUROATE, UMECLIDINIUM BROMIDE AND VILANTEROL TRIFENATATE SCH INHALER: 100; 62.5; 25 POWDER RESPIRATORY (INHALATION) at 11:48

## 2020-12-15 NOTE — PDOC CONSULTATION
Consultation


Consult Date: 12/15/20


Provider Consulted: KADE KAT





History of Present Illness


Admission Date/PCP: 


  20 19:47





  ULYSSES GRANT





Patient complains of: Right hip pain


History of Present Illness: 


CICI KIRKPATRICK is a 55 year old female who sustained a fall on Saturday onto 

her right hip when she tripped and fell.  Patient was not ambulatory but was 

hoping it was just a contusion until she continued to have discomfort and 

inability to ambulate.  She subsequently presented to emergency room where x-

rays demonstrated a fracture.  Patient states pain is controlled with Dilaudid 

but worse with any attempted motion.  Denies chest pain.  Does have chronic 

shortness of breath due to COPD which requires chronic oxygen.  Current pain 

3/5.  Denies numbness.








Past Medical History


Cardiac Medical History: Reports: Congestive Heart Failure - No details 

available., Hypertension


   Denies: Atrial Fibrillation, Coronary Artery Disease, DVT, Myocardial 

Infarction, Hyperlipidema, Pulmonary Embolism


Pulmonary Medical History: Reports: Asthma, Bronchitis, Chronic Obstructive 

Pulmonary Disease (COPD) - With chronic hypoxic respiratory failure on 3 L/min 

nasal oxygen, Pneumonia, Respiratory Failure - Chronic respiratory failure with 

hypoxia


EENT Medical History: Reports: None


Neurological Medical History: Reports: Seizures - Single seizure in the remote 

past


Endocrine Medical History: Reports: None, Diabetes Mellitus Type 1


   Denies: Diabetes Mellitus Type 2, Hyperthyroidism, Hypothyroidism


Renal/ Medical History: Reports: None


Malignancy Medical History: Reports: None


GI Medical History: Reports: None, Other - History of esophageal stricture 

requiring dilatation.


   Denies: Cirrhosis, Crohn's Disease, Gastroesophageal Reflux Disease, 

Hepatitis, Ulcerative Colitis


Musculoskeltal Medical History: Reports: Arthritis


   Denies: Gout


Skin Medical History: Reports: None


   Denies: Eczema, Psoriasis


Psychiatric Medical History: Reports: Bipolar Disorder, Depression


Traumatic Medical History: Reports: None


Hematology: Reports: None


   Denies: Anemia, Bleeding Tendencies


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





Past Surgical History


Past Surgical History: Reports:  Section, Orthopedic Surgery - right 

knee


   Denies: Hysterectomy, Pacemaker





Social History


Lives with: Family


Smoking Status: Unknown if Ever Smoked


Electronic Cigarette use?: No


Frequency of Alcohol Use: None


Hx Recreational Drug Use: Yes


Drugs: Marijuana


Hx Prescription Drug Abuse: No





Family History


Family History: COPD, Hypertension, Other - Patient denies Anesthesia risks.  

denies: CAD, CVA, DM, Malignancy


Parental Family History Reviewed: No


Children Family History Reviewed: No


Sibling(s) Family History Reviewed.: No





Medication/Allergy


Home Medications: 








Bupropion HCl [Wellbutrin Xl 300mg 24hr Tablet] 300 mg PO QAM 10/01/19 


Citalopram Hydrobromide [Celexa 40 mg Tablet] 40 mg PO DAILY 10/01/19 


Risperidone [Risperdal] 6 mg PO DAILY 10/01/19 


Furosemide [Lasix 20 mg Tablet] 20 mg PO QAM 20 


Amlodipine Besylate [Norvasc 5 mg Tablet] 5 mg PO DAILY 20 


Budesonide [Pulmicort Neb 0.5 mg/2 ml Ampul] 0.5 mg NEB RTQ12 20 


Buspirone HCl 7.5 mg PO BID 20 


Perforomist 1 vial NEB RTBID 20 


Albuterol Sulfate [Proair HFA Inhalation Aerosol 8.5 gm MDI] 2 puff IH Q4HP PRN 

20 


Ipratropium/Albuterol Sulfate [Duoneb 3 ml Ampul] 3 ml NEB RTQ6HP PRN 20 


Lisinopril [Zestril] 40 mg PO DAILY 20 


Lorazepam [Ativan 0.5 mg Tablet] 0.5 mg PO Q6HP PRN 20 


Morphine Sulfate 0.25 ml PO Q4HP PRN 20 


Ondansetron [Zofran Odt 4 mg Tablet] 4 mg PO Q8HP PRN 20 


Prednisone [Deltasone 20 mg Tablet] 10 mg PO DAILY 20 








Allergies/Adverse Reactions: 


                                        





No Known Allergies Allergy (Verified 10/17/20 02:07)


   











Review of Systems


Constitutional: ABSENT: chills, fever(s), headache(s), weight gain, weight loss


Eyes: ABSENT: visual disturbances


Ears: ABSENT: hearing changes


Cardiovascular: ABSENT: chest pain, dyspnea on exertion, edema, orthropnea, 

palpitations


Respiratory: PRESENT: as per HPI.  ABSENT: cough, hemoptysis


Gastrointestinal: ABSENT: abdominal pain, constipation, diarrhea, hematemesis, 

hematochezia, nausea, vomiting


Genitourinary: ABSENT: dysuria, hematuria


Musculoskeletal: PRESENT: as per HPI


Integumentary: ABSENT: rash, wounds


Neurological: ABSENT: abnormal gait, abnormal speech, confusion, dizziness, 

focal weakness, syncope


Psychiatric: ABSENT: anxiety, depression, homidical ideation, suicidal ideation


Endocrine: ABSENT: cold intolerance, heat intolerance, menstrual abnormalities, 

polydipsia, polyuria


Hematologic/Lymphatic: ABSENT: easy bleeding, easy bruising, lymphadenopathy





Physical Exam


Vital Signs: 


                                        











Temp Pulse Resp BP Pulse Ox


 


 97.8 F   109 H  16   119/77   93 


 


 12/15/20 04:50  12/15/20 04:50  12/15/20 04:50  12/15/20 04:50  12/15/20 04:50








                                 Intake & Output











 12/14/20 12/15/20 12/16/20





 06:59 06:59 06:59


 


Intake Total  360 


 


Output Total  500 


 


Balance  -140 


 


Weight  63.2 kg 











General appearance: PRESENT: no acute distress, disheveled, well-developed, 

well-nourished


Head exam: PRESENT: atraumatic, normocephalic


Eye exam: PRESENT: conjunctiva pink, EOMI, PERRLA.  ABSENT: scleral icterus


Ear exam: PRESENT: normal external ear exam


Mouth exam: PRESENT: moist, tongue midline


Neck exam: PRESENT: full ROM.  ABSENT: carotid bruit, JVD, lymphadenopathy, 

thyromegaly


Respiratory exam: PRESENT: other - Patient on chronic oxygen


Cardiovascular exam: PRESENT: RRR.  ABSENT: diastolic murmur, rubs, systolic 

murmur


Pulses: PRESENT: normal dorsalis pedis pul, +2 pedal pulses bilateral


Vascular exam: PRESENT: normal capillary refill


GI/Abdominal exam: PRESENT: normal bowel sounds, soft.  ABSENT: distended, 

guarding, mass, organolmegaly, rebound, tenderness


Rectal exam: PRESENT: deferred


Musculoskeletal exam: PRESENT: other - Right lower extremity: Positive logroll. 

Moderate thigh swelling without change, intact plantarflexion/dorsiflexion.  No 

sensory deficits.  No calf tenderness.


Neurological exam: PRESENT: alert, awake, oriented to person, oriented to place,

oriented to time, oriented to situation, CN II-XII grossly intact.  ABSENT: 

motor sensory deficit


Psychiatric exam: PRESENT: appropriate affect, normal mood.  ABSENT: homicidal 

ideation, suicidal ideation


Skin exam: PRESENT: dry, intact, warm.  ABSENT: cyanosis, rash





Results


Laboratory Results: 


                                        





                                 12/15/20 02:35 





                                 12/15/20 02:35 





                                        











  12/14/20 12/14/20 12/15/20





  17:05 17:05 02:35


 


WBC  11.7 H   12.2 H


 


RBC  4.21   4.17


 


Hgb  11.2 L   11.2 L


 


Hct  34.9 L   34.9 L


 


MCV  83   84


 


MCH  26.6 L   26.8 L


 


MCHC  32.1   32.0


 


RDW  14.7 H   14.8 H


 


Plt Count  218   213


 


Seg Neutrophils %  84.9 H   74.3


 


Sodium   131.7 L 


 


Potassium   4.7 


 


Chloride   94 L 


 


Carbon Dioxide   36 H 


 


Anion Gap   2 L 


 


BUN   19 


 


Creatinine   1.01 


 


Est GFR ( Amer)   > 60 


 


Glucose   126 H 


 


Calcium   8.4 


 


Magnesium   


 


Total Bilirubin   


 


AST   


 


Alkaline Phosphatase   


 


Total Protein   


 


Albumin   


 


Triglycerides   


 


Cholesterol   


 


LDL Cholesterol Direct   


 


VLDL Cholesterol   


 


HDL Cholesterol   














  12/15/20





  02:35


 


WBC 


 


RBC 


 


Hgb 


 


Hct 


 


MCV 


 


MCH 


 


MCHC 


 


RDW 


 


Plt Count 


 


Seg Neutrophils % 


 


Sodium  133.2 L


 


Potassium  4.4


 


Chloride  94 L


 


Carbon Dioxide  36 H


 


Anion Gap  3 L


 


BUN  19


 


Creatinine  1.17


 


Est GFR (African Amer)  58 L


 


Glucose  100


 


Calcium  8.6


 


Magnesium  2.0


 


Total Bilirubin  0.4


 


AST  25


 


Alkaline Phosphatase  68


 


Total Protein  5.0 L


 


Albumin  2.8 L


 


Triglycerides  198 H


 


Cholesterol  182.15


 


LDL Cholesterol Direct  79


 


VLDL Cholesterol  39.6 H


 


HDL Cholesterol  76








                                        











  20





  17:05 20:45 22:23


 


Creatine Kinase   69 


 


Troponin I  0.287   0.312














  12/15/20 12/15/20





  02:35 02:35


 


Creatine Kinase   58


 


Troponin I  0.272 











Impressions: 


                                        





Hip/Pelvis X-Ray  20 00:00


IMPRESSION:  Acute displaced subcapital neck fracture of the right femur.  There

is no associated dislocation of the femoroacetabular joint.


 








Chest X-Ray  20 16:49


IMPRESSION:  NO ACUTE RADIOGRAPHIC FINDING IN THE CHEST.


 











Status: Image reviewed by me - I have reviewed patient's radiographs consistent 

with displaced right femoral neck fracture





Assessment & Plan





- Diagnosis


(1) Displaced fracture of right femoral neck


Is this a current diagnosis for this admission?: Yes   


Plan: 


Patient sustained a right femoral neck fracture.  Today we discussed treatment 

options given patient's preinjury ambulatory status I have recommended operative

intervention which includes right hip hemiarthroplasty versus total hip 

arthroplasty.  Plan will be to proceed with operative intervention today pending

medical clearance.  Anesthesia will review patient's chart if they deem patient 

stable for operative treatment will proceed today if they feel she requires fur

ther medical optimization this will delay patient's surgery.  Risk and benefits 

of the surgical procedure have been explained to the patient.  These risks 

includeAnesthetic complications, excessive bleeding, infection, injury to 

surrounding nerves, vessels and tendons, bruising, healing difficulties, scar 

formation, hardware complication, posttraumatic arthritis and any unforseen 

complication.   Patient is verbalized understanding consented for surgical 

procedure.

## 2020-12-15 NOTE — EKG REPORT
SEVERITY:- ABNORMAL ECG -

SINUS TACHYCARDIA

RIGHT BUNDLE BRANCH BLOCK

:

Confirmed by: Eleanor Cha MD 15-Dec-2020 14:15:10

## 2020-12-15 NOTE — PROGRESS NOTE
Provider Note


Provider Note: 





After discussing the case with anesthesia and the medical team.  Patient has 

likely met maximal optimization at this point.  Furthermore cardiology does not 

feel a cardiac event occurred and is likely secondary to her chronic COPD.  

Patient understands risk and benefits of surgical procedure specifically the 

high risk of anesthesia after discussing the risks we will proceed with 

operative intervention.  Plan will be for right hip hemiarthroplasty on 

12/16/2020 with Dr. aPz.

## 2020-12-15 NOTE — XCELERA REPORT
[address] [address]

                               Tel: [phone]

                               Fax: [phone]



                      Transthoracic Echocardiogram Report

_______________________________________________________________________________



Name: CICI KIRKPATRICK

MRN: [id number]                             Age: 55 yrs

Gender: Female                              : 1965

Patient Status: Inpatient                   Patient Location: [facility]A

Account #: [id number]

Study Date: 12/15/2020 10:54 AM

History: Preop cardiac evaluation

COPD

NSTEMI

Accession #: [id number]

_______________________________________________________________________________



Height: 65 in        Weight: 143 lb        BSA: 1.7 m2

_______________________________________________________________________________

Procedure: A complete two-dimensional transthoracic echocardiogram was

performed (2D, M-mode, spectral and color flow Doppler). The study was

technically difficult with many images being suboptimal in quality.

Reason For Study: ELEVATED TROPONIN

Previous Evaluation: A previous study was performed on 10/01/2019 LVEF was

normal.

History: Preop cardiac evaluation

COPD

NSTEMI. Smoker: previous.



Ordering Physician: ROSEMARIE FINK

Performed By: Janet Pacheco

_______________________________________________________________________________



Interpretation Summary

Left ventricular systolic function is low normal.

The Ejection Fraction estimate is 50-55%

The right ventricle is normal in size and function.

There is a trace amount of mitral regurgitation

The aortic valve opens well.

There is a trace amount of tricuspid regurgitation

There is no pericardial effusion.



MMode/2D Measurements & Calculations



RVDd: 1.9 cm   LVIDd: 4.0 cm   FS: 28.0 %             Ao root diam: 2.4 cm

IVSd: 0.87 cm  LVIDs: 2.8 cm   EDV(Teich): 68.1 ml    Ao root area: 4.4 cm2

               LVPWd: 0.81 cm  ESV(Teich): 30.7 ml

                               EF(Teich): 54.8 %



Doppler Measurements & Calculations

MV E max stephan:       MV dec slope:         LV V1 max PG:     PA V2 max:

58.6 cm/sec         425.4 cm/sec2         3.3 mmHg          82.4 cm/sec

MV A max stephan:       MV dec time: 0.14 sec LV V1 max:        PA max P.3 cm/sec                               91.2 cm/sec       2.7 mmHg

MV E/A: 0.96

        _______________________________________________________________

TR max stephan:

171.9 cm/sec

TR max P.8 mmHg





Left Ventricle

The left ventricle is normal in size. There is normal left ventricular wall

thickness. Left ventricular systolic function is low normal. The Ejection

Fraction estimate is 50-55%. Doppler measurements suggest impaired left

ventricular relaxation, which is associated with grade I/IV or mild diastolic

dysfunction. Regional wall motion abnormalities cannot be excluded due to

limited visualization.



Right Ventricle

The right ventricle is normal in size and function.



Atria

The right atrium is mildly dilated. The left atrial size is normal. The

interatrial septum is intact with no evidence for an atrial septal defect.

There is no Doppler evidence for an interatrial shunt.



Mitral Valve

The mitral valve is grossly normal. There is no mitral valve stenosis. There

is a trace amount of mitral regurgitation.





Aortic Valve

The aortic valve is sclerotic and shows some degree of functional abnormality.

The aortic valve opens well. Doppler on Aortiv valve is poorly done, Visually

there is no stenosis and valve is seen to open well.



Tricuspid Valve

There is a trace amount of tricuspid regurgitation. Tricuspid regurgitation

jet envelope not well defined to measure RV systolic pressure accurately.



Pulmonic Valve

The pulmonic valve is normal in structure and function. There is no pulmonic

valvular stenosis. There is a trace or physiologic amount of pulmonic

regurgitation.



Great Vessels

The aortic root is normal size. The inferior vena cava appeared normal and

decreased > 50% with respiration (RAP 5-10 mmHg).





Effusions

There is no pericardial effusion.



_______________________________________________________________________________



_______________________________________________________________________________

Electronically signed by:      Nikos Garcia      on 12/15/2020 05:41 PM



CC: ROSEMARIE FINK Anil

## 2020-12-15 NOTE — PDOC PROGRESS REPORT
Subjective


Date:: 12/15/20


Subjective:: 


Pain is uncontrolled. Breathing is at baseline. She denies CP.


Reason For Visit: 


RIGHT HIP FRACTURE/ ABNORMAL TROPONIN








Physical Exam


Vital Signs: 


                                        











Temp Pulse Resp BP Pulse Ox


 


 99.0 F   100   17   147/76 H  95 


 


 12/15/20 16:46  12/15/20 16:46  12/15/20 16:46  12/15/20 16:46  12/15/20 16:46








                                 Intake & Output











 12/14/20 12/15/20 12/16/20





 06:59 06:59 06:59


 


Intake Total  360 


 


Output Total  500 


 


Balance  -140 


 


Weight  63.2 kg 











General appearance: PRESENT: no acute distress, cooperative


Eye exam: ABSENT: scleral icterus


Mouth exam: PRESENT: moist


Throat exam: ABSENT: post pharyngeal erythema


Neck exam: ABSENT: JVD


Respiratory exam: PRESENT: clear to auscultation flakito, prolonged expiratory phas,

unlabored


Cardiovascular exam: PRESENT: RRR


GI/Abdominal exam: PRESENT: normal bowel sounds, soft.  ABSENT: tenderness


Neurological exam: PRESENT: alert, oriented to person, oriented to place, 

oriented to time, oriented to situation


Psychiatric exam: PRESENT: appropriate affect


Skin exam: ABSENT: jaundice, rash





Results


Laboratory Results: 


                                        





                                 12/15/20 02:35 





                                 12/15/20 02:35 





                                        











  12/15/20 12/15/20 12/15/20





  02:35 02:35 02:35


 


WBC  12.2 H  


 


RBC  4.17  


 


Hgb  11.2 L  


 


Hct  34.9 L  


 


MCV  84  


 


MCH  26.8 L  


 


MCHC  32.0  


 


RDW  14.8 H  


 


Plt Count  213  


 


Seg Neutrophils %  74.3  


 


Carbonic Acid   


 


HCO3/H2CO3 Ratio   


 


ABG pH   


 


ABG pCO2   


 


ABG pO2   


 


ABG HCO3   


 


ABG O2 Saturation   


 


ABG Base Excess   


 


FiO2   


 


Sodium   133.2 L 


 


Potassium   4.4 


 


Chloride   94 L 


 


Carbon Dioxide   36 H 


 


Anion Gap   3 L 


 


BUN   19 


 


Creatinine   1.17 


 


Est GFR ( Amer)   58 L 


 


Glucose   100 


 


Calcium   8.6 


 


Magnesium   2.0 


 


Total Bilirubin   0.4 


 


AST   25 


 


Alkaline Phosphatase   68 


 


Total Protein   5.0 L 


 


Albumin   2.8 L 


 


Triglycerides   198 H 


 


Cholesterol   182.15 


 


LDL Cholesterol Direct   79 


 


VLDL Cholesterol   39.6 H 


 


HDL Cholesterol   76 


 


TSH    0.89














  12/15/20





  08:42


 


WBC 


 


RBC 


 


Hgb 


 


Hct 


 


MCV 


 


MCH 


 


MCHC 


 


RDW 


 


Plt Count 


 


Seg Neutrophils % 


 


Carbonic Acid  2.10 H


 


HCO3/H2CO3 Ratio  17:1


 


ABG pH  7.34 L


 


ABG pCO2  69.9 H*


 


ABG pO2  73.2 L


 


ABG HCO3  36.6 H


 


ABG O2 Saturation  93.2 L


 


ABG Base Excess  8.6


 


FiO2  32%


 


Sodium 


 


Potassium 


 


Chloride 


 


Carbon Dioxide 


 


Anion Gap 


 


BUN 


 


Creatinine 


 


Est GFR (African Amer) 


 


Glucose 


 


Calcium 


 


Magnesium 


 


Total Bilirubin 


 


AST 


 


Alkaline Phosphatase 


 


Total Protein 


 


Albumin 


 


Triglycerides 


 


Cholesterol 


 


LDL Cholesterol Direct 


 


VLDL Cholesterol 


 


HDL Cholesterol 


 


TSH 








                                        











  12/14/20 12/14/20 12/14/20





  17:05 20:45 22:23


 


Creatine Kinase   69 


 


Troponin I  0.287   0.312














  12/15/20 12/15/20 12/15/20





  02:35 02:35 09:01


 


Creatine Kinase   58 


 


Troponin I  0.272   0.245











Impressions: 


                                        





Hip/Pelvis X-Ray  12/14/20 00:00


IMPRESSION:  Acute displaced subcapital neck fracture of the right femur.  There

is no associated dislocation of the femoroacetabular joint.


 








Chest X-Ray  12/14/20 16:49


IMPRESSION:  NO ACUTE RADIOGRAPHIC FINDING IN THE CHEST.


 














Assessment and Plan





- Diagnosis


(1) Stage 4 very severe COPD by GOLD classification


Is this a current diagnosis for this admission?: Yes   





(2) Chronic respiratory failure with hypoxia and hypercapnia


Is this a current diagnosis for this admission?: Yes   





(3) Displaced fracture of right femoral neck


Is this a current diagnosis for this admission?: Yes   





(4) Elevated troponin


Is this a current diagnosis for this admission?: Yes   





(5) Preop cardiovascular exam


Is this a current diagnosis for this admission?: Yes   





(6) Hyponatremia


Is this a current diagnosis for this admission?: Yes   





- Plan Summary


Summary: 


Patient was admitted with right hip fracture requiring surgery. She is currently

receiving home hospice services for advanced, end-stage COPD with chronic 

hypoxemia and hypercapnia. She understands that her time left is limited and 

wishes to maximize her quality of life as much as possible. 





The patient is chest pain-free and has no EKG sign of ongoing cardiac ischemia. 

She does not require further cardiac evaluation prior to surgery. Cardiology 

consultation has been completed. She was a long time smoker and likely has some 

amount of CAD. She understands that she is a high risk surgical candidate and 

wishes to proceed with surgery to improve her quality of life. The patient is 

fully alert and oriented and she is able to understand the difficult situation. 







Case discussed with cardiology and orthopedic surgery. 





Pain medications adjusted. 





NPO past MN (surgery planned for tomorrow).





- Time


Time Spent with patient: 35 or more minutes


Anticipated Discharge Disposition: Home with Hospice


Anticipated Discharge Timeframe: within 48 hours

## 2020-12-15 NOTE — ADVANCED CARE
- Diagnosis


(1) Stage 4 very severe COPD by GOLD classification


Diagnosis Current: Yes   





(2) Chronic respiratory failure with hypoxia and hypercapnia


Diagnosis Current: Yes   





(3) Displaced fracture of right femoral neck


Diagnosis Current: Yes   





(4) Elevated troponin


Diagnosis Current: Yes   





(5) Preop cardiovascular exam


Diagnosis Current: Yes   





(6) Hyponatremia


Diagnosis Current: Yes   


Attendance: 





patient and her 


Resuscitation Status: Do Not Resuscitate


Discussion: 


She is currently receiving home hospice services for advanced, end-stage COPD 

with chronic hypoxemia and hypercapnia. She understands that her time left is 

limited and wishes to maximize her quality of life as much as possible. Code 

status: DNR/DNI.


Time Spent: >20 minutes

## 2020-12-15 NOTE — PDOC CONSULTATION
Consultation


Consult Date: 12/15/20


Attending physician:: ROSEMARIE FINK


Provider Consulted: BALAJI SPRAGUE


Consult reason:: Preoperative cardiovascular exam





History of Present Illness


Admission Date/PCP: 


  20 19:47





  ULYSSES GRANT





History of Present Illness: 


CICI KIRKPATRICK is a 55 year old female


The following active problems


1.  COPD


2.  Nicotine dependence in remission


3.  Chronic respiratory failure


4.  Congestive heart failure





Patient had a mechanical fall.  She states that she slipped on redundant length 

of silky pajamas resulting in a mechanical fall.  There was no loss of 

consciousness.  Patient's activities are significantly curtailed by COPD and 

respiratory distress on exertion but she does not report any chest pain or any 

suggestion of angina.  No prior cardiac history other than questionable history 

of congestive heart failure for which he is on diuretic.  He does not report 

stress testing.





Lifelong cigarette smoker with sometimes 2 packs a day for over 30 years or 

more.  Quit 5 to 7 weeks ago.





No familial illnesses.


Review of systems is positive for hip pain, recent fall.  Dyspnea is also 

endorsed.





Past Medical History


Cardiac Medical History: Reports: Congestive Heart Failure - No details 

available., Hypertension


   Denies: Atrial Fibrillation, Coronary Artery Disease, DVT, Myocardial Infa

rction, Hyperlipidema, Pulmonary Embolism


Pulmonary Medical History: Reports: Asthma, Bronchitis, Chronic Obstructive 

Pulmonary Disease (COPD) - With chronic hypoxic respiratory failure on 3 L/min 

nasal oxygen, Pneumonia, Respiratory Failure - Chronic respiratory failure with 

hypoxia


EENT Medical History: Reports: None


Neurological Medical History: Reports: Seizures - Single seizure in the remote 

past


Endocrine Medical History: Reports: None, Diabetes Mellitus Type 1


   Denies: Diabetes Mellitus Type 2, Hyperthyroidism, Hypothyroidism


Renal/ Medical History: Reports: None


Malignancy Medical History: Reports: None


GI Medical History: Reports: None, Other - History of esophageal stricture 

requiring dilatation.


   Denies: Cirrhosis, Crohn's Disease, Gastroesophageal Reflux Disease, 

Hepatitis, Ulcerative Colitis


Musculoskeltal Medical History: Reports: Arthritis


   Denies: Gout


Skin Medical History: Reports: None


   Denies: Eczema, Psoriasis


Psychiatric Medical History: Reports: Bipolar Disorder, Depression


Traumatic Medical History: Reports: None


Hematology: Reports: None


   Denies: Anemia, Bleeding Tendencies


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





Past Surgical History


Past Surgical History: Reports:  Section, Orthopedic Surgery - right 

knee


   Denies: Hysterectomy, Pacemaker





Social History


Lives with: Family


Smoking Status: Unknown if Ever Smoked


Electronic Cigarette use?: No


Frequency of Alcohol Use: None


Hx Recreational Drug Use: Yes


Drugs: Marijuana


Hx Prescription Drug Abuse: No





Family History


Family History: COPD, Hypertension, Other - Patient denies Anesthesia risks.  

denies: CAD, CVA, DM, Malignancy


Parental Family History Reviewed: Yes - No familial illnesses reported to me


Children Family History Reviewed: NA


Sibling(s) Family History Reviewed.: NA





Medication/Allergy


Home Medications: 








Bupropion HCl [Wellbutrin Xl 300mg 24hr Tablet] 300 mg PO QAM 10/01/19 


Citalopram Hydrobromide [Celexa 40 mg Tablet] 40 mg PO DAILY 10/01/19 


Risperidone [Risperdal] 6 mg PO DAILY 10/01/19 


Furosemide [Lasix 20 mg Tablet] 20 mg PO QAM 20 


Amlodipine Besylate [Norvasc 5 mg Tablet] 5 mg PO DAILY 20 


Budesonide [Pulmicort Neb 0.5 mg/2 ml Ampul] 0.5 mg NEB RTQ12 20 


Buspirone HCl 7.5 mg PO BID 20 


Perforomist 1 vial NEB RTBID 20 


Albuterol Sulfate [Proair HFA Inhalation Aerosol 8.5 gm MDI] 2 puff IH Q4HP PRN 

20 


Ipratropium/Albuterol Sulfate [Duoneb 3 ml Ampul] 3 ml NEB RTQ6HP PRN 20 


Lisinopril [Zestril] 40 mg PO DAILY 20 


Lorazepam [Ativan 0.5 mg Tablet] 0.5 mg PO Q6HP PRN 20 


Morphine Sulfate 0.25 ml PO Q4HP PRN 20 


Ondansetron [Zofran Odt 4 mg Tablet] 4 mg PO Q8HP PRN 20 


Prednisone [Deltasone 20 mg Tablet] 10 mg PO DAILY 20 








Allergies/Adverse Reactions: 


                                        





No Known Allergies Allergy (Verified 10/17/20 02:07)


   











Review of Systems


Cardiovascular: ABSENT: as per HPI, chest pain, dyspnea on exertion, edema, 

orthropnea, palpitations, other


Respiratory: PRESENT: cough, dyspnea


Gastrointestinal: ABSENT: as per HPI, abdominal pain, bloating, coffee ground 

emesis, constipation, diarrhea, dysphagia, heartburn, hematemesis, hematochezia,

melena, nausea, vomiting, other


Musculoskeletal: PRESENT: back pain, deformity, other


Neurological: ABSENT: as per HPI, abnormal gait, abnormal movements, abnormal 

speech, confusion, convulsions, dizziness, focal weakness, frequent falls, lack 

of coordination, memory loss, numbness, paresthesias, restless legs, syncope, 

tingling, tremor(s), vertigo, weakness, other


Endocrine: ABSENT: as per HPI, cold intolerance, flushing, heat intolerance, 

menstrual abnormalities, polydipsia, polyphagia, polyuria, other





Physical Exam


Vital Signs: 


                                        











Temp Pulse Resp BP Pulse Ox


 


 98.1 F   99   17   129/73 H  100 


 


 12/15/20 08:00  12/15/20 08:00  12/15/20 08:00  12/15/20 08:00  12/15/20 08:00








                                 Intake & Output











 12/14/20 12/15/20 12/16/20





 06:59 06:59 06:59


 


Intake Total  360 


 


Output Total  500 


 


Balance  -140 


 


Weight  63.2 kg 











General appearance: PRESENT: disheveled, mild distress, well-developed, 

well-nourished


Head exam: PRESENT: atraumatic, normocephalic


Eye exam: PRESENT: conjunctiva pink, EOMI


Mouth exam: PRESENT: moist


Neck exam: PRESENT: full ROM, JVD


Respiratory exam: PRESENT: symmetrical


Cardiovascular exam: PRESENT: RRR, +S1, +S2


Pulses: PRESENT: normal radial pulses


GI/Abdominal exam: PRESENT: soft


Rectal exam: PRESENT: deferred


Musculoskeletal exam: PRESENT: deformity


Neurological exam: PRESENT: alert, awake, oriented to person, oriented to place,

oriented to time, oriented to situation


Psychiatric exam: PRESENT: appropriate affect


Skin exam: PRESENT: dry, intact, normal color





Results


Laboratory Results: 


                                        





                                 12/15/20 02:35 





                                 12/15/20 02:35 





                                        











  12/14/20 12/14/20 12/15/20





  17:05 17:05 02:35


 


WBC  11.7 H   12.2 H


 


RBC  4.21   4.17


 


Hgb  11.2 L   11.2 L


 


Hct  34.9 L   34.9 L


 


MCV  83   84


 


MCH  26.6 L   26.8 L


 


MCHC  32.1   32.0


 


RDW  14.7 H   14.8 H


 


Plt Count  218   213


 


Seg Neutrophils %  84.9 H   74.3


 


Carbonic Acid   


 


HCO3/H2CO3 Ratio   


 


ABG pH   


 


ABG pCO2   


 


ABG pO2   


 


ABG HCO3   


 


ABG O2 Saturation   


 


ABG Base Excess   


 


FiO2   


 


Sodium   131.7 L 


 


Potassium   4.7 


 


Chloride   94 L 


 


Carbon Dioxide   36 H 


 


Anion Gap   2 L 


 


BUN   19 


 


Creatinine   1.01 


 


Est GFR ( Amer)   > 60 


 


Glucose   126 H 


 


Calcium   8.4 


 


Magnesium   


 


Total Bilirubin   


 


AST   


 


Alkaline Phosphatase   


 


Total Protein   


 


Albumin   


 


Triglycerides   


 


Cholesterol   


 


LDL Cholesterol Direct   


 


VLDL Cholesterol   


 


HDL Cholesterol   


 


TSH   














  12/15/20 12/15/20 12/15/20





  02:35 02:35 08:42


 


WBC   


 


RBC   


 


Hgb   


 


Hct   


 


MCV   


 


MCH   


 


MCHC   


 


RDW   


 


Plt Count   


 


Seg Neutrophils %   


 


Carbonic Acid    2.10 H


 


HCO3/H2CO3 Ratio    17:1


 


ABG pH    7.34 L


 


ABG pCO2    69.9 H*


 


ABG pO2    73.2 L


 


ABG HCO3    36.6 H


 


ABG O2 Saturation    93.2 L


 


ABG Base Excess    8.6


 


FiO2    32%


 


Sodium  133.2 L  


 


Potassium  4.4  


 


Chloride  94 L  


 


Carbon Dioxide  36 H  


 


Anion Gap  3 L  


 


BUN  19  


 


Creatinine  1.17  


 


Est GFR ( Amer)  58 L  


 


Glucose  100  


 


Calcium  8.6  


 


Magnesium  2.0  


 


Total Bilirubin  0.4  


 


AST  25  


 


Alkaline Phosphatase  68  


 


Total Protein  5.0 L  


 


Albumin  2.8 L  


 


Triglycerides  198 H  


 


Cholesterol  182.15  


 


LDL Cholesterol Direct  79  


 


VLDL Cholesterol  39.6 H  


 


HDL Cholesterol  76  


 


TSH   0.89 








                                        











  20





  17:05 20:45 22:23


 


Creatine Kinase   69 


 


Troponin I  0.287   0.312














  12/15/20 12/15/20





  02:35 02:35


 


Creatine Kinase   58


 


Troponin I  0.272 











EKG Comments: 





X-ray hip and pelvis


Acute disc placed subcapital neck fracture right femur.  No associated 

dislocation





Twelve-lead EKG 2020.  1718.  Independently viewed by me.


Sinus rhythm, incomplete right bundle branch block, QTC is 451 ms.





Cardiac troponin





0.287


0.312


0.272


Impressions: 


                                        





Hip/Pelvis X-Ray  20 00:00


IMPRESSION:  Acute displaced subcapital neck fracture of the right femur.  There

is no associated dislocation of the femoroacetabular joint.


 








Chest X-Ray  20 16:49


IMPRESSION:  NO ACUTE RADIOGRAPHIC FINDING IN THE CHEST.


 














Assessment & Plan





- Diagnosis


(1) Chronic obstructive pulmonary disease


Qualifiers: 


   COPD type: unspecified COPD   Qualified Code(s): J44.9 - Chronic obstructive 

pulmonary disease, unspecified   


Is this a current diagnosis for this admission?: Yes   


Plan: 


Heavy smoking history


COPD with home oxygen requirement


This will probably impact her care the most.  Suggest watching respiratory 

status








(2) Closed right hip fracture


Qualifiers: 


   Encounter type: initial encounter   Qualified Code(s): S72.001A - Fracture of

unspecified part of neck of right femur, initial encounter for closed fracture  




Is this a current diagnosis for this admission?: Yes   


Plan: 


Has been evaluated orthopedics








(3) Elevated troponin


Is this a current diagnosis for this admission?: Yes   


Plan: 


Elevated troponin in the setting of chronic hypoxia and respiratory distress as 

well as recent fall


No indication of angina by history.


EKG is nondiagnostic for myocardial ischemia


Mildly elevated troponins.


Unlikely to be acute coronary syndrome.


However underlying coronary artery disease cannot be excluded reliably and she 

is not a candidate for working this up in the current context.  Such work-up is 

unlikely to mitigate or decrease any risk perceived from surgery.








(4) Preop cardiovascular exam


Plan: 


Patient is an acceptable risk for planned surgery without further 

restratification.  Elevated troponins likely a reflection of chronic hypoxic 

respiratory failure and probable underlying elevated PA pressures rather than 

acute coronary syndrome


Clearly there is no evidence of chest pain and nondiagnostic EKG supports this.


Mild elevated troponins in the setting are of unclear clinical significance


Further restratification is unlikely to improve the outcome of planned surgical 

procedure.


For due diligence since coronary artery disease cannot be reliably excluded in 

the current context would recommend aspirin statin and beta-blockers when and if

feasible.


Patient has a diagnosis of congestive heart failure by history and is on 

maintenance diuretic.  Would recommend we continue that.  Presently she appears 

euvolemic by exam and by history.

## 2020-12-16 RX ADMIN — ASPIRIN SCH MG: 81 TABLET, CHEWABLE ORAL at 09:34

## 2020-12-16 RX ADMIN — HYDROMORPHONE HYDROCHLORIDE PRN MG: 2 INJECTION INTRAMUSCULAR; INTRAVENOUS; SUBCUTANEOUS at 10:53

## 2020-12-16 RX ADMIN — MORPHINE SULFATE PRN MG: 10 SOLUTION ORAL at 08:30

## 2020-12-16 RX ADMIN — LISINOPRIL SCH MG: 10 TABLET ORAL at 09:34

## 2020-12-16 RX ADMIN — ENOXAPARIN SODIUM SCH MG: 40 INJECTION SUBCUTANEOUS at 09:33

## 2020-12-16 RX ADMIN — Medication SCH ML: at 21:50

## 2020-12-16 RX ADMIN — ATORVASTATIN CALCIUM SCH MG: 40 TABLET, FILM COATED ORAL at 09:34

## 2020-12-16 RX ADMIN — BUPROPION HYDROCHLORIDE SCH MG: 75 TABLET, FILM COATED ORAL at 09:34

## 2020-12-16 RX ADMIN — FLUTICASONE FUROATE, UMECLIDINIUM BROMIDE AND VILANTEROL TRIFENATATE SCH INHALER: 100; 62.5; 25 POWDER RESPIRATORY (INHALATION) at 09:33

## 2020-12-16 RX ADMIN — CEFAZOLIN SCH MLS/HR: 1 INJECTION, POWDER, FOR SOLUTION INTRAVENOUS at 21:50

## 2020-12-16 RX ADMIN — MORPHINE SULFATE PRN MG: 10 SOLUTION ORAL at 03:31

## 2020-12-16 RX ADMIN — Medication SCH: at 13:49

## 2020-12-16 RX ADMIN — MORPHINE SULFATE PRN MG: 10 SOLUTION ORAL at 12:39

## 2020-12-16 RX ADMIN — Medication SCH: at 05:31

## 2020-12-16 RX ADMIN — AMLODIPINE BESYLATE SCH MG: 10 TABLET ORAL at 09:34

## 2020-12-16 RX ADMIN — CEFAZOLIN SCH: 1 INJECTION, POWDER, FOR SOLUTION INTRAVENOUS at 15:06

## 2020-12-16 RX ADMIN — HYDROMORPHONE HYDROCHLORIDE PRN MG: 2 INJECTION INTRAMUSCULAR; INTRAVENOUS; SUBCUTANEOUS at 20:09

## 2020-12-16 RX ADMIN — MORPHINE SULFATE PRN MG: 10 SOLUTION ORAL at 17:32

## 2020-12-16 RX ADMIN — HYDROMORPHONE HYDROCHLORIDE PRN MG: 2 INJECTION INTRAMUSCULAR; INTRAVENOUS; SUBCUTANEOUS at 15:07

## 2020-12-16 RX ADMIN — BUPROPION HYDROCHLORIDE SCH MG: 75 TABLET, FILM COATED ORAL at 21:50

## 2020-12-16 RX ADMIN — CITALOPRAM HYDROBROMIDE SCH MG: 20 TABLET ORAL at 09:34

## 2020-12-16 NOTE — PDOC PROGRESS REPORT
Subjective


Date:: 12/16/20


Subjective:: 


NAEO. Pain is controlled. Breathing is at baseline. Denies CP.


Reason For Visit: 


RIGHT HIP FRACTURE/ ABNORMAL TROPONIN








Physical Exam


Vital Signs: 


                                        











Temp Pulse Resp BP Pulse Ox


 


 97.6 F   106 H  17   117/66   95 


 


 12/16/20 12:55  12/16/20 12:55  12/16/20 12:55  12/16/20 12:55  12/16/20 12:55








                                 Intake & Output











 12/15/20 12/16/20 12/17/20





 06:59 06:59 06:59


 


Intake Total 360 1300 240


 


Output Total 500 100 


 


Balance -140 1200 240


 


Weight 63.2 kg 63.2 kg 











General appearance: PRESENT: no acute distress, cooperative


Eye exam: ABSENT: scleral icterus


Mouth exam: PRESENT: moist


Throat exam: ABSENT: post pharyngeal erythema


Neck exam: ABSENT: JVD


Respiratory exam: PRESENT: clear to auscultation flakito.  ABSENT: wheezes


Cardiovascular exam: PRESENT: RRR


GI/Abdominal exam: PRESENT: normal bowel sounds, soft.  ABSENT: tenderness


Neurological exam: PRESENT: alert, awake, oriented to person, oriented to place,

oriented to time, oriented to situation


Psychiatric exam: PRESENT: appropriate affect


Skin exam: ABSENT: rash





Results


Laboratory Results: 


                                        





                                 12/15/20 02:35 





                                 12/15/20 02:35 





                                        











  12/14/20 12/14/20 12/14/20





  17:05 20:45 22:23


 


Creatine Kinase   69 


 


Troponin I  0.287   0.312














  12/15/20 12/15/20 12/15/20





  02:35 02:35 09:01


 


Creatine Kinase   58 


 


Troponin I  0.272   0.245











Impressions: 


                                        





Hip/Pelvis X-Ray  12/14/20 00:00


IMPRESSION:  Acute displaced subcapital neck fracture of the right femur.  There

is no associated dislocation of the femoroacetabular joint.


 








Chest X-Ray  12/14/20 16:49


IMPRESSION:  NO ACUTE RADIOGRAPHIC FINDING IN THE CHEST.


 














Assessment and Plan





- Diagnosis


(1) Stage 4 very severe COPD by GOLD classification


Is this a current diagnosis for this admission?: Yes   





(2) Chronic respiratory failure with hypoxia and hypercapnia


Is this a current diagnosis for this admission?: Yes   





(3) Displaced fracture of right femoral neck


Is this a current diagnosis for this admission?: Yes   





(4) Elevated troponin


Is this a current diagnosis for this admission?: Yes   





(5) Preop cardiovascular exam


Is this a current diagnosis for this admission?: Yes   





(6) Hyponatremia


Is this a current diagnosis for this admission?: Yes   





- Plan Summary


Summary: 


Patient was admitted with right hip fracture requiring surgery. She is currently

receiving home hospice services for advanced, end-stage COPD with chronic 

hypoxemia and hypercapnia. She understands that her time left is limited and 

wishes to maximize her quality of life as much as possible. 





The patient is chest pain-free and has no EKG signs of ongoing cardiac ischemia.

She does not require further cardiac evaluation prior to surgery. Cardiology 

consultation has been completed. She was a long time smoker and likely has some 

amount of CAD. She understands that she is a high risk surgical candidate and 

wishes to proceed with surgery to improve her quality of life. The patient is 

fully alert and oriented and she is able to understand the difficult situation. 







There is nothing more that can be done to optimize her respiratory or cardiac 

status. She is euvolemic. She is on appropriate inhaler medications.





NPO past MN (surgery planned for tomorrow). Hold Lovenox tomorrow morning in 

preparation for surgery.





Code Status: DNR/DNI 





- Time


Time Spent with patient: 35 or more minutes


Anticipated Discharge Disposition: Home with Hospice


Anticipated Discharge Timeframe: within 48 hours

## 2020-12-16 NOTE — PDOC PROGRESS REPORT
Subjective


Date:: 12/16/20


Subjective:: 





Patient seen and examined this morning.  She has pain but is currently well cont

rolled pain medications.  No acute events overnight.


Reason For Visit: 


RIGHT HIP FRACTURE/ ABNORMAL TROPONIN








Physical Exam


Vital Signs: 


                                        











Temp Pulse Resp BP Pulse Ox


 


 98.6 F   107 H  16   150/81 H  99 


 


 12/16/20 00:40  12/16/20 02:00  12/16/20 00:40  12/16/20 00:40  12/16/20 00:40








                                 Intake & Output











 12/15/20 12/16/20 12/17/20





 06:59 06:59 06:59


 


Intake Total 360 1300 


 


Output Total 500 100 


 


Balance -140 1200 


 


Weight 63.2 kg 63.2 kg 











Physical Exam: 





No acute distress, alert and oriented x3.


Patient on continuous nasal cannula





Right lower extremity


-Pulses 2+ distally


-Compartments soft


-Sensation grossly intact to L3-4-5 S1


-Motor grossly intact to EHL TA gastroc and quad


-Leg shortened, externally rotated pain with any attempted motion.











Results


Laboratory Results: 


                                        





                                 12/15/20 02:35 





                                 12/15/20 02:35 





                                        











  12/15/20





  08:42


 


Carbonic Acid  2.10 H


 


HCO3/H2CO3 Ratio  17:1


 


ABG pH  7.34 L


 


ABG pCO2  69.9 H*


 


ABG pO2  73.2 L


 


ABG HCO3  36.6 H


 


ABG O2 Saturation  93.2 L


 


ABG Base Excess  8.6


 


FiO2  32%








                                        











  12/14/20 12/14/20 12/14/20





  17:05 20:45 22:23


 


Creatine Kinase   69 


 


Troponin I  0.287   0.312














  12/15/20 12/15/20 12/15/20





  02:35 02:35 09:01


 


Creatine Kinase   58 


 


Troponin I  0.272   0.245











Impressions: 


                                        





Hip/Pelvis X-Ray  12/14/20 00:00


IMPRESSION:  Acute displaced subcapital neck fracture of the right femur.  There

is no associated dislocation of the femoroacetabular joint.


 








Chest X-Ray  12/14/20 16:49


IMPRESSION:  NO ACUTE RADIOGRAPHIC FINDING IN THE CHEST.


 














Assessment & Plan





- Diagnosis


(1) Displaced fracture of right femoral neck


Is this a current diagnosis for this admission?: Yes   


Plan: 


-Current plan will be for a right hip hemiarthroplasty to be performed tomorrow.

 Patient is to be placed n.p.o. tonight


Hold all chemical DVT prophylaxis at midnight tonight


Bedrest


-Continue current pain medication.


-Medicine to further optimize lung function prior to surgery.  We will pursue 

spinal anesthesia


Ancef on-call to the OR.








- Time


Time Spent with patient: Less than 15 minutes

## 2020-12-17 PROCEDURE — 0SRR0JZ REPLACEMENT OF RIGHT HIP JOINT, FEMORAL SURFACE WITH SYNTHETIC SUBSTITUTE, OPEN APPROACH: ICD-10-PCS

## 2020-12-17 RX ADMIN — Medication SCH: at 05:55

## 2020-12-17 RX ADMIN — CEFAZOLIN SCH MLS/HR: 1 INJECTION, POWDER, FOR SOLUTION INTRAVENOUS at 05:49

## 2020-12-17 RX ADMIN — LISINOPRIL SCH MG: 10 TABLET ORAL at 13:15

## 2020-12-17 RX ADMIN — ACETAMINOPHEN SCH MG: 325 TABLET ORAL at 17:38

## 2020-12-17 RX ADMIN — CEFAZOLIN SCH MLS/HR: 1 INJECTION, POWDER, FOR SOLUTION INTRAVENOUS at 21:33

## 2020-12-17 RX ADMIN — CITALOPRAM HYDROBROMIDE SCH MG: 20 TABLET ORAL at 13:16

## 2020-12-17 RX ADMIN — Medication SCH ML: at 21:34

## 2020-12-17 RX ADMIN — ATORVASTATIN CALCIUM SCH MG: 40 TABLET, FILM COATED ORAL at 13:16

## 2020-12-17 RX ADMIN — BUPROPION HYDROCHLORIDE SCH MG: 75 TABLET, FILM COATED ORAL at 21:33

## 2020-12-17 RX ADMIN — Medication SCH ML: at 13:17

## 2020-12-17 RX ADMIN — FLUTICASONE FUROATE, UMECLIDINIUM BROMIDE AND VILANTEROL TRIFENATATE SCH INHALER: 100; 62.5; 25 POWDER RESPIRATORY (INHALATION) at 13:16

## 2020-12-17 RX ADMIN — MORPHINE SULFATE PRN MG: 10 SOLUTION ORAL at 14:13

## 2020-12-17 RX ADMIN — CEFAZOLIN SCH MLS/HR: 1 INJECTION, POWDER, FOR SOLUTION INTRAVENOUS at 13:16

## 2020-12-17 RX ADMIN — AMLODIPINE BESYLATE SCH MG: 10 TABLET ORAL at 13:15

## 2020-12-17 RX ADMIN — HYDROMORPHONE HYDROCHLORIDE PRN MG: 2 INJECTION INTRAMUSCULAR; INTRAVENOUS; SUBCUTANEOUS at 00:22

## 2020-12-17 RX ADMIN — MORPHINE SULFATE PRN MG: 10 SOLUTION ORAL at 18:43

## 2020-12-17 RX ADMIN — BUPROPION HYDROCHLORIDE SCH MG: 75 TABLET, FILM COATED ORAL at 13:16

## 2020-12-17 NOTE — RADIOLOGY REPORT (SQ)
EXAM DESCRIPTION:  NO CHG FLUORO; HIP IN OPERATING RM



IMAGES COMPLETED DATE/TIME:  12/17/2020 1:48 pm



REASON FOR STUDY:  RIGHT HIP ANGELO ARTHROPLASTY ASSISTED WITH FLUORO IN OR



COMPARISON:  None.



FLUOROSCOPY TIME:  0.1 minute

1 images saved to PACS.



TECHNIQUE:  Intra-operative images acquired during surgical procedure to evaluate progress.

NUMBER OF IMAGES: 1



LIMITATIONS:  None.



FINDINGS:  Fluoroscopic image centered over femoral component of hip arthroplasty.



IMPRESSION:  IMAGE(S) OBTAINED DURING PROCEDURE.



COMMENT:  Quality :  Final reports for procedures using fluoroscopy that document radiation exp
osure indices, or exposure time and number of fluorographic images (if radiation exposure indices are
 not available)

Please consult full operative report of the attending physician for description of the procedure.



TECHNICAL DOCUMENTATION:  JOB ID:  [id number]

 2011 [employer]- All Rights Reserved



Reading location - IP/workstation name: [email or web address]

## 2020-12-17 NOTE — RADIOLOGY REPORT (SQ)
EXAM DESCRIPTION:  NO CHG FLUORO; HIP IN OPERATING RM



IMAGES COMPLETED DATE/TIME:  12/17/2020 1:48 pm



REASON FOR STUDY:  RIGHT HIP ANGELO ARTHROPLASTY ASSISTED WITH FLUORO IN OR



COMPARISON:  None.



FLUOROSCOPY TIME:  0.1 minute

1 images saved to PACS.



TECHNIQUE:  Intra-operative images acquired during surgical procedure to evaluate progress.

NUMBER OF IMAGES: 1



LIMITATIONS:  None.



FINDINGS:  Fluoroscopic image centered over femoral component of hip arthroplasty.



IMPRESSION:  IMAGE(S) OBTAINED DURING PROCEDURE.



COMMENT:  Quality :  Final reports for procedures using fluoroscopy that document radiation exp
osure indices, or exposure time and number of fluorographic images (if radiation exposure indices are
 not available)

Please consult full operative report of the attending physician for description of the procedure.



TECHNICAL DOCUMENTATION:  JOB ID:  [id number]

 2011 [employer]- All Rights Reserved



Reading location - IP/workstation name: [email or web address]

## 2020-12-17 NOTE — OPERATIVE REPORT
Operative Report


DATE OF SURGERY: 12/17/20


PREOPERATIVE DIAGNOSIS: Right placed femoral neck fracture


POSTOPERATIVE DIAGNOSIS: Right displaced femoral neck fracture


OPERATION: Right hip hemiarthroplasty


SURGEON: PRABHAKAR DE DIOS JR


ANESTHESIA: Spinal


COMPLICATIONS: 





None


ESTIMATED BLOOD LOSS: 50 cc


PROCEDURE: 





The patient was brought to the operative suite where they underwent spinal 

anesthesia.  After adequate anesthesia, they were placed supine on the operating

table both legs were hung sterilely prepped with chlorhexidine.  2 g of Ancef 

was given preoperatively.  After draping a standard sterile fashion an 

appropriate timeout was performed.  A standard anterior incision was made with 

cautery through the soft tissue down to the tensor fascia.  This was then 

sharply incised and then bluntly developed until reaching the crossing 

vasculature.  These were cauterized and then with the assistance of retraction 

the hip capsule was isolated.  An incision was made into the hip capsule with 

cautery and the fracture was exposed.  A freshening neck cut was made at the 

appropriate level for stem implantation.  The head was then removed and all 

debris within the capsule at this point was removed a trial implant was placed 

starting with a 46 and working her way to a 47 which had excellent fit.  We then

turned our attention to the femur which we carefully exposed.  





Starting with a  we then progressed broaching starting with a 7 and 

finally obtaining excellent fit at a 12.  A standard offset trial with a -3 mm 

neck length was placed on the broach and reduced.  This produced excellent fit, 

no impingement, minimal shuck, near equal if not slightly long leg length, and 

no position of instability throughout a full range of motion.  Intraoperative 

fluoroscopy was utilized to confirm stem position which was excellent.  After 

this the hip was dislocated the trials were removed and the wound was thoroughly

irrigated with dilute Betadine solution.  The final stem was implanted into a 

clean and dried, along with the unipolar head assembly.  This was then reduced 

and again taken through a range of motion which demonstrated excellent stability

and leg length.  





The wound was then irrigated with dilute betadyne solution, after this was 

evacuated and evaluated for any potential bleeding which was cauterized.  The 

wound was irrigated again.  The fascia was then closed with #2 barbed absorbable

suture.  The adipose layer was also closed with this suture, followed with 2-0 

monocryl subcutaneous interrupted and finally 2-0 Monocryl running 

subcutaneously.  An occlusive silver dressing was then applied.  The drapes were

removed and the patient was then transferred to PACU in stable condition.

## 2020-12-17 NOTE — RADIOLOGY REPORT (SQ)
EXAM DESCRIPTION:  HIP RIGHT AP/LATERAL



IMAGES COMPLETED DATE/TIME:  12/17/2020 2:07 pm



REASON FOR STUDY:  post op



COMPARISON:  None.



NUMBER OF VIEWS:  One view



TECHNIQUE:  Digital radiographic images of the pelvis post-procedure



LIMITATIONS:  None.



FINDINGS:  BONES: No worrisome or unexpected findings post-procedure.

DEVICE: Right bipolar hip prosthesis.

SOFT TISSUES: No worrisome findings. Expected postoperative soft tissue changes.



IMPRESSION:  SATISFACTORY POSTOPERATIVE PELVIS.



TECHNICAL DOCUMENTATION:  JOB ID:  [id number]

 [employer]- All Rights Reserved



Reading location - IP/workstation name: [email or web address]

## 2020-12-17 NOTE — PDOC PROGRESS REPORT
Subjective


Date:: 12/17/20


Subjective:: 





Patient seen and examined this morning.  No acute events overnight.  Eager to ha

ve surgery and began ambulating again.  I discussed the case with the patient 

regarding risk benefits and potential outcomes.  Family was at bedside and I 

answered all questions.


Reason For Visit: 


RIGHT HIP FRACTURE/ ABNORMAL TROPONIN








Physical Exam


Vital Signs: 


                                        











Temp Pulse Resp BP Pulse Ox


 


 98.8 F   105 H  18   131/74 H  99 


 


 12/17/20 10:14  12/17/20 10:14  12/17/20 10:14  12/17/20 10:14  12/17/20 10:14








                                 Intake & Output











 12/16/20 12/17/20 12/18/20





 06:59 06:59 06:59


 


Intake Total 1300 836 


 


Output Total 100  


 


Balance 1200 836 


 


Weight 63.2 kg 65.2 kg 











Physical Exam: 





No acute distress, alert and orient x3.  On continuous nasal cannula.





Right lower extremity


-Pulses 2+ distally


-Compartments soft


-Sensation grossly intact to L3-4-5 S1


-Motor grossly intact to EHL TA gastroc and quad


Leg shortened, externally rotated.





Results


Laboratory Results: 


                                        





                                 12/15/20 02:35 





                                 12/15/20 02:35 





                                        











  12/14/20 12/14/20 12/14/20





  17:05 20:45 22:23


 


Creatine Kinase   69 


 


Troponin I  0.287   0.312














  12/15/20 12/15/20 12/15/20





  02:35 02:35 09:01


 


Creatine Kinase   58 


 


Troponin I  0.272   0.245











Impressions: 


                                        





Hip/Pelvis X-Ray  12/14/20 00:00


IMPRESSION:  Acute displaced subcapital neck fracture of the right femur.  There

is no associated dislocation of the femoroacetabular joint.


 








Chest X-Ray  12/14/20 16:49


IMPRESSION:  NO ACUTE RADIOGRAPHIC FINDING IN THE CHEST.


 














Assessment & Plan





- Diagnosis


(1) Displaced fracture of right femoral neck


Is this a current diagnosis for this admission?: Yes   


Plan: 





 Patient is scheduled for right hip hemiarthroplasty today


Keep n.p.o.


Hold all chemical DVT prophylaxis


-At this point she is medically optimized and being evaluated by anesthesia








After surgery the plan will be as follows.





- 2 doses of Ancef postoperatively q 8 hours to complete 24 hours 

perioperatively 


-Weightbearing as tolerated, no precautions, encourage out of bed ASAP for ADL 

training


- PT/OT


-aspirin 325 daily for DVT prophylaxis for 6 weeks


-multimodal pain management to avoid excessive narcotics, including gabapentin, 

tramadol, Toradol, acetaminophen.


-Dressing should not be removed for 7 to 10 days until seen in the office


-May shower with the dressing intact, if it starts to come off she should not 

get the incision wet.


-Follow-up with Dr. Kofi Paz, orthopedic surgeon at [facility] for 

surgery, in 10 days.  Call for an appointment.  Telephone #[phone], [place] [address]








- Time


Time Spent with patient: Less than 15 minutes

## 2020-12-17 NOTE — PDOC PROGRESS REPORT
Subjective


Date:: 12/17/20


Subjective:: 


VILMAEO. Went to the OR today, tolerated well. Pain is controlled. She is working 

with PT this afternoon.


Reason For Visit: 


RIGHT HIP FRACTURE/ ABNORMAL TROPONIN








Physical Exam


Vital Signs: 


                                        











Temp Pulse Resp BP Pulse Ox


 


 98.2 F   106 H  17   122/69   99 


 


 12/17/20 16:00  12/17/20 16:00  12/17/20 16:00  12/17/20 16:00  12/17/20 16:00








                                 Intake & Output











 12/16/20 12/17/20 12/18/20





 06:59 06:59 06:59


 


Intake Total 5835 078 7417


 


Output Total 100  100


 


Balance 1200 836 900


 


Weight 63.2 kg 65.2 kg 











General appearance: PRESENT: no acute distress, cooperative


Eye exam: ABSENT: scleral icterus


Mouth exam: PRESENT: moist


Throat exam: ABSENT: post pharyngeal erythema


Neck exam: ABSENT: JVD


Respiratory exam: PRESENT: clear to auscultation flakito, tachypnea


Cardiovascular exam: PRESENT: RRR


GI/Abdominal exam: PRESENT: normal bowel sounds, soft.  ABSENT: tenderness


Extremities exam: ABSENT: pedal edema


Musculoskeletal exam: PRESENT: other - R hip surgical site bandage c/d/i


Neurological exam: PRESENT: alert, awake, oriented to person, oriented to place,

oriented to time, oriented to situation


Psychiatric exam: PRESENT: appropriate affect


Skin exam: ABSENT: rash





Results


Laboratory Results: 


                                        





                                 12/15/20 02:35 





                                 12/15/20 02:35 





                                        











  12/14/20 12/14/20 12/14/20





  17:05 20:45 22:23


 


Creatine Kinase   69 


 


Troponin I  0.287   0.312














  12/15/20 12/15/20 12/15/20





  02:35 02:35 09:01


 


Creatine Kinase   58 


 


Troponin I  0.272   0.245











Impressions: 


                                        





Chest X-Ray  12/14/20 16:49


IMPRESSION:  NO ACUTE RADIOGRAPHIC FINDING IN THE CHEST.


 








Fluoroscopy  12/17/20 00:00


IMPRESSION:  IMAGE(S) OBTAINED DURING PROCEDURE.


 








Hip X-Ray  12/17/20 00:00


IMPRESSION:  IMAGE(S) OBTAINED DURING PROCEDURE.


 








Hip/Pelvis X-Ray  12/17/20 00:00


IMPRESSION:  SATISFACTORY POSTOPERATIVE PELVIS.


 














Assessment and Plan





- Diagnosis


(1) Stage 4 very severe COPD by GOLD classification


Is this a current diagnosis for this admission?: Yes   





(2) Chronic respiratory failure with hypoxia and hypercapnia


Is this a current diagnosis for this admission?: Yes   





(3) Displaced fracture of right femoral neck


Is this a current diagnosis for this admission?: Yes   





(4) Elevated troponin


Is this a current diagnosis for this admission?: Yes   





(5) Preop cardiovascular exam


Is this a current diagnosis for this admission?: Yes   





(6) Hyponatremia


Is this a current diagnosis for this admission?: Yes   





- Plan Summary


Summary: 


Right displaced femoral neck fracture: s/p hip hemiarthroplasty on 12/17/2020. 

She tolerated surgery well. Awaiting PT consultation. Pain is controlled.


* Weightbearing as tolerated, no precautions, encourage out of bed ASAP for ADL 

  training


* PT


* aspirin 325 daily for DVT prophylaxis for 6 weeks


* Dressing should not be removed for 7 to 10 days until seen in the office. May 

  shower with the dressing intact, if it starts to come off she should not get 

  the incision wet.


* Follow-up with Dr. Kofi Paz, orthopedic surgeon at [facility] for 

  surgery, in 10 days. Call for an appointment. Telephone #[phone], [place] [address]





Severe, End-Stage COPD: She is currently receiving home hospice services for 

advanced, end-stage COPD with chronic hypoxemia and hypercapnia. 





NSTEMI, type 2: demand ischemia due to pain and fall. Patient has remained chest

pain-free and has had no EKG signs of cardiac ischemia. She does not require 

further cardiac evaluation prior to surgery. Cardiology consultation has been 

completed. She was a long time smoker and likely has some amount of CAD. ASA, 

statin and beta blocker therapy initiated.





Hypertension: well controlled on current regimen.





Code Status: DNR/DNI 





- Time


Time Spent with patient: 35 or more minutes


Anticipated Discharge Disposition: Home with Hospice


Anticipated Discharge Timeframe: within 24 hours

## 2020-12-18 VITALS — DIASTOLIC BLOOD PRESSURE: 74 MMHG | SYSTOLIC BLOOD PRESSURE: 131 MMHG

## 2020-12-18 RX ADMIN — AMLODIPINE BESYLATE SCH MG: 10 TABLET ORAL at 09:20

## 2020-12-18 RX ADMIN — LISINOPRIL SCH MG: 10 TABLET ORAL at 09:19

## 2020-12-18 RX ADMIN — CEFAZOLIN SCH MLS/HR: 1 INJECTION, POWDER, FOR SOLUTION INTRAVENOUS at 14:45

## 2020-12-18 RX ADMIN — MORPHINE SULFATE PRN MG: 10 SOLUTION ORAL at 00:05

## 2020-12-18 RX ADMIN — ATORVASTATIN CALCIUM SCH MG: 40 TABLET, FILM COATED ORAL at 09:19

## 2020-12-18 RX ADMIN — HYDROMORPHONE HYDROCHLORIDE PRN MG: 2 INJECTION INTRAMUSCULAR; INTRAVENOUS; SUBCUTANEOUS at 05:32

## 2020-12-18 RX ADMIN — MORPHINE SULFATE PRN MG: 10 SOLUTION ORAL at 14:39

## 2020-12-18 RX ADMIN — CEFAZOLIN SCH MLS/HR: 1 INJECTION, POWDER, FOR SOLUTION INTRAVENOUS at 05:24

## 2020-12-18 RX ADMIN — ACETAMINOPHEN SCH MG: 325 TABLET ORAL at 14:38

## 2020-12-18 RX ADMIN — Medication SCH ML: at 15:03

## 2020-12-18 RX ADMIN — FLUTICASONE FUROATE, UMECLIDINIUM BROMIDE AND VILANTEROL TRIFENATATE SCH INHALER: 100; 62.5; 25 POWDER RESPIRATORY (INHALATION) at 09:26

## 2020-12-18 RX ADMIN — ACETAMINOPHEN SCH MG: 325 TABLET ORAL at 09:20

## 2020-12-18 RX ADMIN — BUPROPION HYDROCHLORIDE SCH MG: 75 TABLET, FILM COATED ORAL at 09:20

## 2020-12-18 RX ADMIN — Medication SCH ML: at 05:24

## 2020-12-18 RX ADMIN — HYDROMORPHONE HYDROCHLORIDE PRN MG: 2 INJECTION INTRAMUSCULAR; INTRAVENOUS; SUBCUTANEOUS at 09:21

## 2020-12-18 RX ADMIN — CITALOPRAM HYDROBROMIDE SCH MG: 20 TABLET ORAL at 09:20

## 2020-12-18 NOTE — PDOC PROGRESS REPORT
Subjective


Date:: 12/18/20


Subjective:: 





Patient examined.  She had surgery for right hip fracture yesterday which was un

eventful.  Resting comfortably.  No chest pain or dyspnea reported.


Reason For Visit: 


RIGHT HIP FRACTURE/ ABNORMAL TROPONIN








Physical Exam


Vital Signs: 


                                        











Temp Pulse Resp BP Pulse Ox


 


 98.1 F   96   19   106/58 L  97 


 


 12/18/20 07:41  12/18/20 07:41  12/18/20 07:41  12/18/20 07:41  12/18/20 07:41








                                 Intake & Output











 12/17/20 12/18/20 12/19/20





 06:59 06:59 06:59


 


Intake Total 836 1640 


 


Output Total  100 


 


Balance 836 1540 


 


Weight 65.2 kg 64.9 kg 











General appearance: PRESENT: no acute distress, disheveled, well-developed, 

well-nourished


Head exam: PRESENT: atraumatic, normocephalic


Eye exam: PRESENT: conjunctiva pink, EOMI


Teeth exam: PRESENT: poor dentation


Respiratory exam: PRESENT: crackles, decreased breath sounds, prolonged 

expiratory phas, symmetrical, unlabored


Cardiovascular exam: PRESENT: +S1, +S2


Pulses: PRESENT: normal radial pulses


GI/Abdominal exam: PRESENT: soft


Rectal exam: PRESENT: deferred


Neurological exam: PRESENT: alert, awake, oriented to person, oriented to place,

oriented to time, oriented to situation


Psychiatric exam: PRESENT: appropriate affect


Skin exam: PRESENT: dry, intact





Results


Laboratory Results: 


                                        





                                 12/15/20 02:35 





                                 12/15/20 02:35 





                                        











  12/14/20 12/14/20 12/14/20





  17:05 20:45 22:23


 


Creatine Kinase   69 


 


Troponin I  0.287   0.312














  12/15/20 12/15/20 12/15/20





  02:35 02:35 09:01


 


Creatine Kinase   58 


 


Troponin I  0.272   0.245











Impressions: 


                                        





Chest X-Ray  12/14/20 16:49


IMPRESSION:  NO ACUTE RADIOGRAPHIC FINDING IN THE CHEST.


 








Fluoroscopy  12/17/20 00:00


IMPRESSION:  IMAGE(S) OBTAINED DURING PROCEDURE.


 








Hip X-Ray  12/17/20 00:00


IMPRESSION:  IMAGE(S) OBTAINED DURING PROCEDURE.


 








Hip/Pelvis X-Ray  12/17/20 00:00


IMPRESSION:  SATISFACTORY POSTOPERATIVE PELVIS.


 














Assessment & Plan





- Diagnosis


(1) Chronic obstructive pulmonary disease


Qualifiers: 


   COPD type: unspecified COPD   Qualified Code(s): J44.9 - Chronic obstructive 

pulmonary disease, unspecified   


Is this a current diagnosis for this admission?: Yes   


Plan: 


Advanced COPD


Ideally stop smoking.  But she is indicated that very difficult.








(2) Closed right hip fracture


Qualifiers: 


   Encounter type: initial encounter   Qualified Code(s): S72.001A - Fracture of

unspecified part of neck of right femur, initial encounter for closed fracture  




Is this a current diagnosis for this admission?: Yes   


Plan: 


Status post operative repair and doing well.  Surgery was uncomplicated no 

cardiac complications








(3) Elevated troponin


Is this a current diagnosis for this admission?: Yes   


Plan: 


Unlikely clinical tolerated surgery without difficulty.  No indication for 

ischemic evaluation.  Echocardiogram with preserved ejection fraction and no 

significant wall motion abnormalities or valve lesion

## 2020-12-18 NOTE — PDOC PROGRESS REPORT
Subjective


Date:: 12/18/20


Subjective:: 





Patient seen and examined his morning.  Ambulating well.  No complaints overnigh

t or acute events.  Reports overall feeling well and BM to get herself up out of

bed comfortably.  Pain well controlled.


Reason For Visit: 


RIGHT HIP FRACTURE/ ABNORMAL TROPONIN








Physical Exam


Vital Signs: 


                                        











Temp Pulse Resp BP Pulse Ox


 


 98.1 F   93   18   111/65   100 


 


 12/18/20 00:10  12/18/20 02:00  12/18/20 00:10  12/18/20 00:10  12/18/20 00:50








                                 Intake & Output











 12/17/20 12/18/20 12/19/20





 06:59 06:59 06:59


 


Intake Total 836 1640 


 


Output Total  100 


 


Balance 836 1540 


 


Weight 65.2 kg 64.9 kg 











Physical Exam: 





No acute distress, alert and orient x3, on continuous nasal cannula





Right lower extremity


-Pulses 2+ distally


Dressings clean dry and intact


-Compartments soft


-Sensation grossly intact to L3-4-5 S1


-Motor grossly intact to EHL TA gastroc and quad








Results


Laboratory Results: 


                                        





                                 12/15/20 02:35 





                                 12/15/20 02:35 





                                        











  12/14/20 12/14/20 12/14/20





  17:05 20:45 22:23


 


Creatine Kinase   69 


 


Troponin I  0.287   0.312














  12/15/20 12/15/20 12/15/20





  02:35 02:35 09:01


 


Creatine Kinase   58 


 


Troponin I  0.272   0.245











Impressions: 


                                        





Chest X-Ray  12/14/20 16:49


IMPRESSION:  NO ACUTE RADIOGRAPHIC FINDING IN THE CHEST.


 








Fluoroscopy  12/17/20 00:00


IMPRESSION:  IMAGE(S) OBTAINED DURING PROCEDURE.


 








Hip X-Ray  12/17/20 00:00


IMPRESSION:  IMAGE(S) OBTAINED DURING PROCEDURE.


 








Hip/Pelvis X-Ray  12/17/20 00:00


IMPRESSION:  SATISFACTORY POSTOPERATIVE PELVIS.


 














Assessment & Plan





- Diagnosis


(1) Displaced fracture of right femoral neck


Is this a current diagnosis for this admission?: Yes   


Plan: 


Status post hemiarthroplasty postoperative day #1.


Patient doing well at this time.  Ambulating well.  Vital signs are stable.  

Stable orthopedically for discharge when medically appropriate.





-  2 doses of Ancef postoperatively q 8 hours to complete 24 hours 

perioperatively 


-Weightbearing as tolerated, no precautions, encourage out of bed ASAP for ADL 

training


- PT/OT


-aspirin 325 daily for DVT prophylaxis for 6 weeks


-multimodal pain management to avoid excessive narcotics, including gabapentin, 

tramadol, Toradol, acetaminophen.


-Dressing should not be removed for 7 to 10 days until seen in the office


-May shower with the dressing intact, if it starts to come off she should not 

get the incision wet.


-Follow-up with Dr. Kofi Paz, orthopedic surgeon at [facility] for 

surgery, in 10 days.  Call for an appointment.  Telephone #[phone], [place] [address]








- Time


Time Spent with patient: Less than 15 minutes

## 2020-12-18 NOTE — PDOC TRANSFER SUMMARY
General





- Admit/Disc Date/PCP


Admission Date/Primary Care Provider: 


  12/14/20 19:47





  KATHE GRANT-IDRIS





Discharge Date: 12/18/20





- Discharge Diagnosis


(1) COPD (chronic obstructive pulmonary disease)


Is this a current diagnosis for this admission?: Yes   





(2) Chronic obstructive pulmonary disease


Is this a current diagnosis for this admission?: Yes   





(3) Chronic respiratory failure with hypoxia


Is this a current diagnosis for this admission?: Yes   





(4) Chronic respiratory failure with hypoxia and hypercapnia


Is this a current diagnosis for this admission?: Yes   





(5) Closed right hip fracture


Is this a current diagnosis for this admission?: Yes   





(6) Displaced fracture of right femoral neck


Is this a current diagnosis for this admission?: Yes   





(7) Elevated troponin


Is this a current diagnosis for this admission?: Yes   





(8) Preop cardiovascular exam


Is this a current diagnosis for this admission?: Yes   





- Additional Information


Resuscitation Status: Do Not Resuscitate


Discharge Diet: As Tolerated, Regular


Discharge Activity: Activity As Tolerated, Balance Activity w/Rest


Prescriptions: 


Aspirin [Aspirin 81 mg Chewable Tablet] 81 mg PO BID #60 tab.chew


Bupropion HCl [Bupropion Xl] 150 mg PO DAILY #30 tab.er.24h


Citalopram Hydrobromide [Celexa 20 mg Tablet] 20 mg PO DAILY #30 tablet


Home Medications: 








Amlodipine Besylate [Norvasc 5 mg Tablet] 5 mg PO DAILY 11/25/20 


Budesonide [Pulmicort Neb 0.5 mg/2 ml Ampul] 0.5 mg NEB RTQ12 11/25/20 


Buspirone HCl 7.5 mg PO BID 11/25/20 


Perforomist 1 vial NEB RTBID 11/25/20 


Albuterol Sulfate [Proair HFA Inhalation Aerosol 8.5 gm MDI] 2 puff IH Q4HP PRN 

12/14/20 


Ipratropium/Albuterol Sulfate [Duoneb 3 ml Ampul] 3 ml NEB RTQ6HP PRN 12/14/20 


Lisinopril [Zestril] 40 mg PO DAILY 12/14/20 


Lorazepam [Ativan 0.5 mg Tablet] 0.5 mg PO Q6HP PRN 12/14/20 


Morphine Sulfate 0.25 ml PO Q4HP PRN 12/14/20 


Ondansetron [Zofran Odt 4 mg Tablet] 4 mg PO Q8HP PRN 12/14/20 


Aspirin [Aspirin 81 mg Chewable Tablet] 81 mg PO BID #60 tab.chew 12/18/20 


Bupropion HCl [Bupropion Xl] 150 mg PO DAILY #30 tab.er.24h 12/18/20 


Citalopram Hydrobromide [Celexa 20 mg Tablet] 20 mg PO DAILY #30 tablet 12/18/20













History of Present Illness


Admission Date/PCP: 


  12/14/20 19:47





  KATHE GRANT-C





History of Present Illness: 


CICI KIRKPATRICK is a 55 year old female








Hospital Course


Hospital Course: 





Per Previous Physician:


"CICI KIRKPATRICK is a 55 year old female


The patient is ambulatory. 2 days ago she fell at home. She fell on her right 

hip. Ever since then she is unable to walk. Because of the persistent severe 

right hip area pain and inability to walk she came to the emergency department. 

She was found to have a displaced right subcapital femoral neck fracture. She 

does not have any other complaint except the right hip area pain which actually 

controlled with intravenous hydromorphone. To me she denies having any chest 

pain or shortness of breath earlier she mentions some chest pain and a troponin 

was run which is slightly elevated. Clinically the patient does not appear to 

have any kind of ongoing cardiac ischemia. She has chronic shortness of breath 

and on chronic oxygen,she did not appear to be in any respiratory distress. She 

had no arrhythmia. Her blood pressure is stable.


Patient has advanced COPD and chronic hypoxic respiratory failure on 24/7 oxygen

3 L/min via nasal cannula. Since she quit smoking about a month ago her 

respiratory status is better. She has no increased wheezing, coughing or other 

acute respiratory symptoms.


Patient enrolled in home hospice about 1-1/2-week ago. She would like to be 

actively treated at this point and she would like to be full code. She is 

pleasant and cooperative and has good understanding of her problems.


She never had coronary artery disease, never had any cardiac work-up. She was 

told having congestive heart failure and taking 20 mg furosemide daily. Does not

know more details about this problem."





On day of discharge, patient requesting to go home with hospice.  She was on 

hospice prior to admission and would like to go back on this.  She has end-stage

COPD.  We will give her 81 mg aspirin twice daily for DVT prophylaxis.  I 

believe she is too high of a bleeding/fall risk to give her anything stronger 

than that especially considering the sedating medications she will be taking on 

hospice.  She can follow-up with her PCP and orthopedic surgeon as needed.








Per Previous Physician:


"(1) Closed right hip fracture


Qualifiers: 


   Encounter type: initial encounter   Qualified Code(s): S72.001A - Fracture of

unspecified part of neck of right femur, initial encounter for closed fracture  




Is this a current diagnosis for this admission?: Yes   


Plan: 


Patient will need surgery. Orthopedic consultation was requested. Because of the

slight elevated troponin the patient may need cardiology evaluation, repeat 

troponin, telemetry monitoring, echocardiogram. Hopefully surgery can be done on

Wednesday, 16 December. I started DVT prophylaxis with Lovenox tonight, this 

should be discontinued 24 hours prior to surgery. She is on mechanical 

prophylaxis as well.








(2) Elevated troponin


Is this a current diagnosis for this admission?: Yes   


Plan: 


The patient has mildly elevated troponin but she is chest pain-free and EKG is 

not suggestive of ongoing cardiac ischemia.  Total CK is pending, in case the CK

significantly elevated the troponin is probably related to skeletal muscle 

contusion.  If the CK is not significantly elevated the patient probably has 

some troponin leak.  I placed her on telemetry monitor.  Repeat troponin.  I 

ordered her aspirin.  Echocardiogram.  Cardiology consultation was requested.


The patient has some questionable history of congestive heart failure.  I do not

find any previous documentation of ejection fraction.  At present the patient 

does not appear to be any heart failure.  I am going to hold her furosemide.








(3) Congestive heart failure


Qualifiers: 


   Heart failure type: unspecified   Heart failure chronicity: chronic   

Qualified Code(s): I50.9 - Heart failure, unspecified   


Is this a current diagnosis for this admission?: Yes   


Plan: 


The patient carries a diagnosis of congestive heart failure.  I do not find 

echocardiogram reports.  I do not find any specifics about her heart failure the

medical record.  The patient seems to be fully compensated.  I am going to hold 

furosemide.  Echocardiogram was ordered.








(4) Chronic respiratory failure with hypoxia


Is this a current diagnosis for this admission?: Yes   


Plan: 


Continue oxygen therapy.  Her respiratory status seems to be stable, at 

baseline.








(5) COPD (chronic obstructive pulmonary disease)


Qualifiers: 


   COPD type: emphysema   Emphysema type: unspecified   Qualified Code(s): J43.9

- Emphysema, unspecified   


Is this a current diagnosis for this admission?: Yes   


Plan: 


She has advanced COPD.  No sign of COPD exacerbation.  She does have some 

wheezing but it is chronic.  She quit smoking about a month ago, since then she 

is feeling better.  Continue bronchodilators.








(6) Hypertension


Qualifiers: 


   Hypertension type: essential hypertension   Qualified Code(s): I10 - 

Essential (primary) hypertension   


Is this a current diagnosis for this admission?: Yes   


Plan: 


Continue her usual blood pressure medications.








(7) Bipolar 1 disorder


Is this a current diagnosis for this admission?: Yes   


Plan: 


I resumed her antidepressants.  She is not quite sure about rest of her 

psychiatric medications.  Tomorrow medication list will be updated in the 

morning."





Physical Exam


Vital Signs: 


                                        











Temp Pulse Resp BP Pulse Ox


 


 98.3 F   88   17   105/64   100 


 


 12/18/20 11:26  12/18/20 11:26  12/18/20 11:26  12/18/20 11:26  12/18/20 11:26








                                 Intake & Output











 12/17/20 12/18/20 12/19/20





 06:59 06:59 06:59


 


Intake Total 836 1640 356


 


Output Total  100 


 


Balance 836 1540 356


 


Weight 65.2 kg 64.9 kg 











Exam: 





General appearance: PRESENT: no acute distress, thin, chronically ill-appearing 

white female


Head exam: PRESENT: atraumatic, normocephalic


Eye exam: PRESENT: conjunctiva pink.  ABSENT: scleral icterus


Mouth exam: PRESENT: moist


Respiratory exam: PRESENT: Chronic poor air movement, clear to auscultation flakito.

 ABSENT: rales, rhonchi, wheezes


Cardiovascular exam: PRESENT: RRR.  ABSENT: diastolic murmur, rubs, systolic 

murmur


GI/Abdominal exam: PRESENT: normal bowel sounds, soft.  ABSENT: distended, 

guarding, mass, organolmegaly, rebound, tenderness


Neurological exam: PRESENT: alert, awake, oriented to person, oriented to place,

oriented to time, oriented to situation


Psychiatric exam: PRESENT: appropriate affect, normal mood


Skin exam: PRESENT: dry, intact, warm





Results


Laboratory Results: 


                                        





                                 12/15/20 02:35 





                                 12/15/20 02:35 





                                        











  12/14/20 12/14/20 12/14/20





  17:05 20:45 22:23


 


Creatine Kinase   69 


 


Troponin I  0.287   0.312














  12/15/20 12/15/20 12/15/20





  02:35 02:35 09:01


 


Creatine Kinase   58 


 


Troponin I  0.272   0.245











Impressions: 


                                        





Chest X-Ray  12/14/20 16:49


IMPRESSION:  NO ACUTE RADIOGRAPHIC FINDING IN THE CHEST.


 








Fluoroscopy  12/17/20 00:00


IMPRESSION:  IMAGE(S) OBTAINED DURING PROCEDURE.


 








Hip X-Ray  12/17/20 00:00


IMPRESSION:  IMAGE(S) OBTAINED DURING PROCEDURE.


 








Hip/Pelvis X-Ray  12/17/20 00:00


IMPRESSION:  SATISFACTORY POSTOPERATIVE PELVIS.


 














Transfer Plan





- Disposition


Transfer Plan: 





home with hospice





- Time Spent with Patient


Time spent with patient: Greater than 30 Minutes





Qualifiers


PATIENT BEING DISCHARGED WITH ANY OF THE FOLLOWING DIAGNOSIS: No





Plan


Discharge Plan: 





home with hospice


81mg ASA BID X30d for dvt ppx


Time Spent: Greater than 30 Minutes